# Patient Record
Sex: MALE | Race: WHITE | Employment: OTHER | URBAN - METROPOLITAN AREA
[De-identification: names, ages, dates, MRNs, and addresses within clinical notes are randomized per-mention and may not be internally consistent; named-entity substitution may affect disease eponyms.]

---

## 2017-07-05 PROBLEM — R79.89 LOW TESTOSTERONE: Status: ACTIVE | Noted: 2017-07-05

## 2017-11-30 ENCOUNTER — HOSPITAL ENCOUNTER (OUTPATIENT)
Dept: NUCLEAR MEDICINE | Age: 57
Discharge: HOME | End: 2017-11-30
Attending: FAMILY MEDICINE
Payer: COMMERCIAL

## 2017-11-30 ENCOUNTER — HOSPITAL ENCOUNTER (OUTPATIENT)
Dept: ULTRASOUND IMAGING | Age: 57
Discharge: HOME | End: 2017-11-30
Attending: FAMILY MEDICINE
Payer: COMMERCIAL

## 2017-11-30 VITALS — WEIGHT: 279.1 LBS | BODY MASS INDEX: 38.93 KG/M2

## 2017-11-30 DIAGNOSIS — R10.10 PAIN OF UPPER ABDOMEN: ICD-10-CM

## 2017-11-30 PROCEDURE — 76700 US EXAM ABDOM COMPLETE: CPT

## 2017-11-30 PROCEDURE — 74011250636 HC RX REV CODE- 250/636: Performed by: FAMILY MEDICINE

## 2017-11-30 PROCEDURE — 78227 HEPATOBIL SYST IMAGE W/DRUG: CPT

## 2017-11-30 RX ORDER — SODIUM CHLORIDE 0.9 % (FLUSH) 0.9 %
10 SYRINGE (ML) INJECTION
Status: COMPLETED | OUTPATIENT
Start: 2017-11-30 | End: 2017-11-30

## 2017-11-30 RX ADMIN — Medication 10 ML: at 13:04

## 2017-11-30 RX ADMIN — SINCALIDE 2.53 MCG: 5 INJECTION, POWDER, LYOPHILIZED, FOR SOLUTION INTRAVENOUS at 14:02

## 2017-12-04 NOTE — PROGRESS NOTES
The ultrasound was normal except for fatty liver changes of the liver. The gallbladder function appears to be normal with an ejection fraction of 66% which normal is about 35%. We could get another gastroenterology consult to follow-up particularly on the fatty liver and abdominal discomfort.   Let me know if you would like for me to make that appointment

## 2018-06-28 PROBLEM — E66.01 SEVERE OBESITY (BMI 35.0-39.9): Status: ACTIVE | Noted: 2018-06-28

## 2019-03-26 ENCOUNTER — HOSPITAL ENCOUNTER (OUTPATIENT)
Dept: CT IMAGING | Age: 59
Discharge: HOME | End: 2019-03-26
Attending: FAMILY MEDICINE

## 2019-03-26 DIAGNOSIS — R10.32 LLQ ABDOMINAL PAIN: ICD-10-CM

## 2019-03-26 RX ORDER — SODIUM CHLORIDE 0.9 % (FLUSH) 0.9 %
10 SYRINGE (ML) INJECTION
Status: COMPLETED | OUTPATIENT
Start: 2019-03-26 | End: 2019-03-26

## 2019-03-26 RX ADMIN — Medication 10 ML: at 16:27

## 2019-03-26 NOTE — PROGRESS NOTES
You do have evidence on the CT of diverticulosis but no evidence of diverticulitis although I know you have been on antibiotics. Otherwise the CT was unremarkable. Would probably suggest getting back to gastroenterology if your symptoms are not improving.   Let me know how you are doing

## 2019-10-11 PROBLEM — R73.09 ELEVATED HEMOGLOBIN A1C: Status: ACTIVE | Noted: 2019-10-11

## 2021-02-09 PROBLEM — K21.9 GASTROESOPHAGEAL REFLUX DISEASE WITHOUT ESOPHAGITIS: Status: ACTIVE | Noted: 2021-02-09

## 2021-02-09 PROBLEM — R07.2 PRECORDIAL PAIN: Status: ACTIVE | Noted: 2021-02-09

## 2021-02-18 ENCOUNTER — HOSPITAL ENCOUNTER (OUTPATIENT)
Dept: CT IMAGING | Age: 61
Discharge: HOME | End: 2021-02-18
Attending: INTERNAL MEDICINE
Payer: SELF-PAY

## 2021-02-18 DIAGNOSIS — R07.9 CHEST PAIN, UNSPECIFIED TYPE: ICD-10-CM

## 2021-02-18 DIAGNOSIS — E78.2 ELEVATED TRIGLYCERIDES WITH HIGH CHOLESTEROL: ICD-10-CM

## 2021-02-18 PROCEDURE — 75571 CT HRT W/O DYE W/CA TEST: CPT

## 2021-03-11 PROBLEM — R20.9: Status: ACTIVE | Noted: 2021-03-11

## 2021-04-13 ENCOUNTER — HOSPITAL ENCOUNTER (OUTPATIENT)
Dept: ULTRASOUND IMAGING | Age: 61
Discharge: HOME | End: 2021-04-13
Attending: FAMILY MEDICINE
Payer: COMMERCIAL

## 2021-04-13 ENCOUNTER — HOSPITAL ENCOUNTER (OUTPATIENT)
Dept: NUCLEAR MEDICINE | Age: 61
Discharge: HOME | End: 2021-04-13
Attending: FAMILY MEDICINE
Payer: COMMERCIAL

## 2021-04-13 DIAGNOSIS — R10.10 PAIN OF UPPER ABDOMEN: ICD-10-CM

## 2021-04-13 DIAGNOSIS — R11.0 NAUSEA: ICD-10-CM

## 2021-04-13 PROCEDURE — 76700 US EXAM ABDOM COMPLETE: CPT

## 2021-04-14 NOTE — PROGRESS NOTES
Ultrasound did confirm that you have some gallstones, probably need to get you referred to see a surgeon so wanted to see if you had a preference on who we send you to.   My recommendation would be someone like Dr. Elle Barrientos with [facility]

## 2021-04-20 PROBLEM — K80.20 CALCULUS OF GALLBLADDER WITHOUT CHOLECYSTITIS WITHOUT OBSTRUCTION: Status: ACTIVE | Noted: 2021-04-20

## 2021-05-05 ENCOUNTER — ANESTHESIA EVENT (OUTPATIENT)
Dept: SURGERY | Age: 61
End: 2021-05-05
Payer: COMMERCIAL

## 2021-05-06 ENCOUNTER — HOSPITAL ENCOUNTER (OUTPATIENT)
Age: 61
Setting detail: OUTPATIENT SURGERY
Discharge: HOME | End: 2021-05-06
Attending: SURGERY | Admitting: SURGERY
Payer: COMMERCIAL

## 2021-05-06 ENCOUNTER — ANESTHESIA (OUTPATIENT)
Dept: SURGERY | Age: 61
End: 2021-05-06
Payer: COMMERCIAL

## 2021-05-06 VITALS
BODY MASS INDEX: 31.71 KG/M2 | SYSTOLIC BLOOD PRESSURE: 119 MMHG | DIASTOLIC BLOOD PRESSURE: 70 MMHG | HEIGHT: 72 IN | WEIGHT: 234.13 LBS | RESPIRATION RATE: 17 BRPM | OXYGEN SATURATION: 97 % | TEMPERATURE: 97.6 F | HEART RATE: 74 BPM

## 2021-05-06 DIAGNOSIS — K80.20 CALCULUS OF GALLBLADDER WITHOUT CHOLECYSTITIS WITHOUT OBSTRUCTION: Primary | ICD-10-CM

## 2021-05-06 LAB
ALBUMIN SERPL-MCNC: 3.3 G/DL (ref 3.2–4.6)
ALBUMIN/GLOB SERPL: 0.9 {RATIO} (ref 1.2–3.5)
ALP SERPL-CCNC: 85 U/L (ref 50–136)
ALT SERPL-CCNC: 19 U/L (ref 12–65)
ANION GAP SERPL CALC-SCNC: 4 MMOL/L (ref 7–16)
APPEARANCE UR: CLEAR
AST SERPL-CCNC: 20 U/L (ref 15–37)
BASOPHILS # BLD: 0.1 K/UL (ref 0–0.2)
BASOPHILS NFR BLD: 1 % (ref 0–2)
BILIRUB SERPL-MCNC: 0.6 MG/DL (ref 0.2–1.1)
BILIRUB UR QL: NEGATIVE
BUN SERPL-MCNC: 13 MG/DL (ref 8–23)
CALCIUM SERPL-MCNC: 9 MG/DL (ref 8.3–10.4)
CHLORIDE SERPL-SCNC: 103 MMOL/L (ref 98–107)
CO2 SERPL-SCNC: 30 MMOL/L (ref 21–32)
COLOR UR: YELLOW
CREAT SERPL-MCNC: 0.69 MG/DL (ref 0.8–1.5)
DIFFERENTIAL METHOD BLD: ABNORMAL
EOSINOPHIL # BLD: 0.2 K/UL (ref 0–0.8)
EOSINOPHIL NFR BLD: 2 % (ref 0.5–7.8)
ERYTHROCYTE [DISTWIDTH] IN BLOOD BY AUTOMATED COUNT: 13.1 % (ref 11.9–14.6)
GLOBULIN SER CALC-MCNC: 3.5 G/DL (ref 2.3–3.5)
GLUCOSE BLD STRIP.AUTO-MCNC: 93 MG/DL (ref 65–100)
GLUCOSE SERPL-MCNC: 96 MG/DL (ref 65–100)
GLUCOSE UR STRIP.AUTO-MCNC: NEGATIVE MG/DL
HCT VFR BLD AUTO: 42.8 % (ref 41.1–50.3)
HGB BLD-MCNC: 14.2 G/DL (ref 13.6–17.2)
HGB UR QL STRIP: NEGATIVE
IMM GRANULOCYTES # BLD AUTO: 0.1 K/UL (ref 0–0.5)
IMM GRANULOCYTES NFR BLD AUTO: 1 % (ref 0–5)
INR PPP: 0.9
KETONES UR QL STRIP.AUTO: NEGATIVE MG/DL
LEUKOCYTE ESTERASE UR QL STRIP.AUTO: NEGATIVE
LYMPHOCYTES # BLD: 1.8 K/UL (ref 0.5–4.6)
LYMPHOCYTES NFR BLD: 20 % (ref 13–44)
MCH RBC QN AUTO: 30.4 PG (ref 26.1–32.9)
MCHC RBC AUTO-ENTMCNC: 33.2 G/DL (ref 31.4–35)
MCV RBC AUTO: 91.6 FL (ref 79.6–97.8)
MONOCYTES # BLD: 0.7 K/UL (ref 0.1–1.3)
MONOCYTES NFR BLD: 8 % (ref 4–12)
NEUTS SEG # BLD: 6.4 K/UL (ref 1.7–8.2)
NEUTS SEG NFR BLD: 69 % (ref 43–78)
NITRITE UR QL STRIP.AUTO: NEGATIVE
NRBC # BLD: 0 K/UL (ref 0–0.2)
PH UR STRIP: 7 [PH] (ref 5–9)
PLATELET # BLD AUTO: 223 K/UL (ref 150–450)
PMV BLD AUTO: 9.2 FL (ref 9.4–12.3)
POTASSIUM SERPL-SCNC: 4.4 MMOL/L (ref 3.5–5.1)
PROT SERPL-MCNC: 6.8 G/DL (ref 6.3–8.2)
PROT UR STRIP-MCNC: NEGATIVE MG/DL
PROTHROMBIN TIME: 12.5 SEC (ref 12.5–14.7)
RBC # BLD AUTO: 4.67 M/UL (ref 4.23–5.6)
SERVICE CMNT-IMP: NORMAL
SODIUM SERPL-SCNC: 137 MMOL/L (ref 138–145)
SP GR UR REFRACTOMETRY: 1.02 (ref 1–1.02)
UROBILINOGEN UR QL STRIP.AUTO: 1 EU/DL (ref 0.2–1)
WBC # BLD AUTO: 9.3 K/UL (ref 4.3–11.1)

## 2021-05-06 PROCEDURE — 77030012770 HC TRCR OPT FX AMR -B: Performed by: SURGERY

## 2021-05-06 PROCEDURE — 77030008518 HC TBNG INSUF ENDO STRY -B: Performed by: SURGERY

## 2021-05-06 PROCEDURE — 77030039425 HC BLD LARYNG TRULITE DISP TELE -A: Performed by: STUDENT IN AN ORGANIZED HEALTH CARE EDUCATION/TRAINING PROGRAM

## 2021-05-06 PROCEDURE — 74011250636 HC RX REV CODE- 250/636: Performed by: SURGERY

## 2021-05-06 PROCEDURE — 77030037088 HC TUBE ENDOTRACH ORAL NSL COVD-A: Performed by: STUDENT IN AN ORGANIZED HEALTH CARE EDUCATION/TRAINING PROGRAM

## 2021-05-06 PROCEDURE — 74011250636 HC RX REV CODE- 250/636: Performed by: NURSE ANESTHETIST, CERTIFIED REGISTERED

## 2021-05-06 PROCEDURE — 74011250636 HC RX REV CODE- 250/636: Performed by: STUDENT IN AN ORGANIZED HEALTH CARE EDUCATION/TRAINING PROGRAM

## 2021-05-06 PROCEDURE — 2709999900 HC NON-CHARGEABLE SUPPLY: Performed by: SURGERY

## 2021-05-06 PROCEDURE — 77030040361 HC SLV COMPR DVT MDII -B: Performed by: SURGERY

## 2021-05-06 PROCEDURE — 85025 COMPLETE CBC W/AUTO DIFF WBC: CPT

## 2021-05-06 PROCEDURE — 85610 PROTHROMBIN TIME: CPT

## 2021-05-06 PROCEDURE — 80053 COMPREHEN METABOLIC PANEL: CPT

## 2021-05-06 PROCEDURE — 76060000033 HC ANESTHESIA 1 TO 1.5 HR: Performed by: SURGERY

## 2021-05-06 PROCEDURE — 77030020829: Performed by: SURGERY

## 2021-05-06 PROCEDURE — 77030007955 HC PCH ENDOSC SPEC J&J -B: Performed by: SURGERY

## 2021-05-06 PROCEDURE — 74011000250 HC RX REV CODE- 250: Performed by: NURSE ANESTHETIST, CERTIFIED REGISTERED

## 2021-05-06 PROCEDURE — 77030040922 HC BLNKT HYPOTHRM STRY -A: Performed by: STUDENT IN AN ORGANIZED HEALTH CARE EDUCATION/TRAINING PROGRAM

## 2021-05-06 PROCEDURE — 74011000250 HC RX REV CODE- 250: Performed by: SURGERY

## 2021-05-06 PROCEDURE — 77030008756 HC TU IRR SUC STRY -B: Performed by: SURGERY

## 2021-05-06 PROCEDURE — 77030021158 HC TRCR BLN GELPRT AMR -B: Performed by: SURGERY

## 2021-05-06 PROCEDURE — 76210000006 HC OR PH I REC 0.5 TO 1 HR: Performed by: SURGERY

## 2021-05-06 PROCEDURE — 88304 TISSUE EXAM BY PATHOLOGIST: CPT

## 2021-05-06 PROCEDURE — 77030019908 HC STETH ESOPH SIMS -A: Performed by: STUDENT IN AN ORGANIZED HEALTH CARE EDUCATION/TRAINING PROGRAM

## 2021-05-06 PROCEDURE — 82962 GLUCOSE BLOOD TEST: CPT

## 2021-05-06 PROCEDURE — 47562 LAPAROSCOPIC CHOLECYSTECTOMY: CPT | Performed by: SURGERY

## 2021-05-06 PROCEDURE — 77030031139 HC SUT VCRL2 J&J -A: Performed by: SURGERY

## 2021-05-06 PROCEDURE — 77030016151 HC PROTCTR LNS DFOG COVD -B: Performed by: SURGERY

## 2021-05-06 PROCEDURE — 76210000020 HC REC RM PH II FIRST 0.5 HR: Performed by: SURGERY

## 2021-05-06 PROCEDURE — 77030000038 HC TIP SCIS LAPSCP SURI -B: Performed by: SURGERY

## 2021-05-06 PROCEDURE — 81003 URINALYSIS AUTO W/O SCOPE: CPT

## 2021-05-06 PROCEDURE — 74011250637 HC RX REV CODE- 250/637: Performed by: STUDENT IN AN ORGANIZED HEALTH CARE EDUCATION/TRAINING PROGRAM

## 2021-05-06 PROCEDURE — 47562 LAPAROSCOPIC CHOLECYSTECTOMY: CPT | Performed by: NURSE PRACTITIONER

## 2021-05-06 PROCEDURE — 77030010513 HC APPL CLP LIG J&J -C: Performed by: SURGERY

## 2021-05-06 PROCEDURE — 76010000161 HC OR TIME 1 TO 1.5 HR INTENSV-TIER 1: Performed by: SURGERY

## 2021-05-06 RX ORDER — SODIUM CHLORIDE 0.9 % (FLUSH) 0.9 %
5-40 SYRINGE (ML) INJECTION AS NEEDED
Status: DISCONTINUED | OUTPATIENT
Start: 2021-05-06 | End: 2021-05-06 | Stop reason: HOSPADM

## 2021-05-06 RX ORDER — LIDOCAINE HYDROCHLORIDE 10 MG/ML
0.1 INJECTION INFILTRATION; PERINEURAL AS NEEDED
Status: DISCONTINUED | OUTPATIENT
Start: 2021-05-06 | End: 2021-05-06 | Stop reason: HOSPADM

## 2021-05-06 RX ORDER — SODIUM CHLORIDE, SODIUM LACTATE, POTASSIUM CHLORIDE, CALCIUM CHLORIDE 600; 310; 30; 20 MG/100ML; MG/100ML; MG/100ML; MG/100ML
100 INJECTION, SOLUTION INTRAVENOUS CONTINUOUS
Status: DISCONTINUED | OUTPATIENT
Start: 2021-05-06 | End: 2021-05-06 | Stop reason: HOSPADM

## 2021-05-06 RX ORDER — SODIUM CHLORIDE 0.9 % (FLUSH) 0.9 %
5-40 SYRINGE (ML) INJECTION EVERY 8 HOURS
Status: DISCONTINUED | OUTPATIENT
Start: 2021-05-06 | End: 2021-05-06 | Stop reason: HOSPADM

## 2021-05-06 RX ORDER — CEFAZOLIN SODIUM/WATER 2 G/20 ML
2 SYRINGE (ML) INTRAVENOUS ONCE
Status: COMPLETED | OUTPATIENT
Start: 2021-05-06 | End: 2021-05-06

## 2021-05-06 RX ORDER — OXYCODONE HYDROCHLORIDE 5 MG/1
5 TABLET ORAL
Status: COMPLETED | OUTPATIENT
Start: 2021-05-06 | End: 2021-05-06

## 2021-05-06 RX ORDER — DIPHENHYDRAMINE HYDROCHLORIDE 50 MG/ML
12.5 INJECTION, SOLUTION INTRAMUSCULAR; INTRAVENOUS
Status: DISCONTINUED | OUTPATIENT
Start: 2021-05-06 | End: 2021-05-06 | Stop reason: HOSPADM

## 2021-05-06 RX ORDER — METRONIDAZOLE 500 MG/100ML
500 INJECTION, SOLUTION INTRAVENOUS ONCE
Status: COMPLETED | OUTPATIENT
Start: 2021-05-06 | End: 2021-05-06

## 2021-05-06 RX ORDER — GLYCOPYRROLATE 0.2 MG/ML
INJECTION INTRAMUSCULAR; INTRAVENOUS AS NEEDED
Status: DISCONTINUED | OUTPATIENT
Start: 2021-05-06 | End: 2021-05-06 | Stop reason: HOSPADM

## 2021-05-06 RX ORDER — ROCURONIUM BROMIDE 10 MG/ML
INJECTION, SOLUTION INTRAVENOUS AS NEEDED
Status: DISCONTINUED | OUTPATIENT
Start: 2021-05-06 | End: 2021-05-06 | Stop reason: HOSPADM

## 2021-05-06 RX ORDER — MIDAZOLAM HYDROCHLORIDE 1 MG/ML
2 INJECTION, SOLUTION INTRAMUSCULAR; INTRAVENOUS
Status: DISCONTINUED | OUTPATIENT
Start: 2021-05-06 | End: 2021-05-06 | Stop reason: HOSPADM

## 2021-05-06 RX ORDER — LIDOCAINE HYDROCHLORIDE 20 MG/ML
INJECTION, SOLUTION EPIDURAL; INFILTRATION; INTRACAUDAL; PERINEURAL AS NEEDED
Status: DISCONTINUED | OUTPATIENT
Start: 2021-05-06 | End: 2021-05-06 | Stop reason: HOSPADM

## 2021-05-06 RX ORDER — ONDANSETRON 2 MG/ML
INJECTION INTRAMUSCULAR; INTRAVENOUS AS NEEDED
Status: DISCONTINUED | OUTPATIENT
Start: 2021-05-06 | End: 2021-05-06 | Stop reason: HOSPADM

## 2021-05-06 RX ORDER — BUPIVACAINE HYDROCHLORIDE AND EPINEPHRINE 5; 5 MG/ML; UG/ML
INJECTION, SOLUTION EPIDURAL; INTRACAUDAL; PERINEURAL AS NEEDED
Status: DISCONTINUED | OUTPATIENT
Start: 2021-05-06 | End: 2021-05-06 | Stop reason: HOSPADM

## 2021-05-06 RX ORDER — NEOSTIGMINE METHYLSULFATE 1 MG/ML
INJECTION, SOLUTION INTRAVENOUS AS NEEDED
Status: DISCONTINUED | OUTPATIENT
Start: 2021-05-06 | End: 2021-05-06 | Stop reason: HOSPADM

## 2021-05-06 RX ORDER — DEXAMETHASONE SODIUM PHOSPHATE 100 MG/10ML
INJECTION INTRAMUSCULAR; INTRAVENOUS AS NEEDED
Status: DISCONTINUED | OUTPATIENT
Start: 2021-05-06 | End: 2021-05-06 | Stop reason: HOSPADM

## 2021-05-06 RX ORDER — NALOXONE HYDROCHLORIDE 0.4 MG/ML
0.1 INJECTION, SOLUTION INTRAMUSCULAR; INTRAVENOUS; SUBCUTANEOUS AS NEEDED
Status: DISCONTINUED | OUTPATIENT
Start: 2021-05-06 | End: 2021-05-06 | Stop reason: HOSPADM

## 2021-05-06 RX ORDER — HYDROMORPHONE HYDROCHLORIDE 1 MG/ML
0.5 INJECTION, SOLUTION INTRAMUSCULAR; INTRAVENOUS; SUBCUTANEOUS
Status: DISCONTINUED | OUTPATIENT
Start: 2021-05-06 | End: 2021-05-06 | Stop reason: HOSPADM

## 2021-05-06 RX ORDER — PROPOFOL 10 MG/ML
INJECTION, EMULSION INTRAVENOUS AS NEEDED
Status: DISCONTINUED | OUTPATIENT
Start: 2021-05-06 | End: 2021-05-06 | Stop reason: HOSPADM

## 2021-05-06 RX ORDER — HYDROCODONE BITARTRATE AND ACETAMINOPHEN 5; 325 MG/1; MG/1
1 TABLET ORAL
Qty: 15 TAB | Refills: 0 | Status: SHIPPED | OUTPATIENT
Start: 2021-05-06 | End: 2021-05-11

## 2021-05-06 RX ORDER — FLUMAZENIL 0.1 MG/ML
0.2 INJECTION INTRAVENOUS
Status: DISCONTINUED | OUTPATIENT
Start: 2021-05-06 | End: 2021-05-06 | Stop reason: HOSPADM

## 2021-05-06 RX ORDER — HYDROMORPHONE HYDROCHLORIDE 2 MG/ML
INJECTION, SOLUTION INTRAMUSCULAR; INTRAVENOUS; SUBCUTANEOUS AS NEEDED
Status: DISCONTINUED | OUTPATIENT
Start: 2021-05-06 | End: 2021-05-06 | Stop reason: HOSPADM

## 2021-05-06 RX ORDER — FENTANYL CITRATE 50 UG/ML
INJECTION, SOLUTION INTRAMUSCULAR; INTRAVENOUS AS NEEDED
Status: DISCONTINUED | OUTPATIENT
Start: 2021-05-06 | End: 2021-05-06 | Stop reason: HOSPADM

## 2021-05-06 RX ADMIN — OXYCODONE HYDROCHLORIDE 5 MG: 5 TABLET ORAL at 09:44

## 2021-05-06 RX ADMIN — Medication 5 MG: at 09:03

## 2021-05-06 RX ADMIN — PROPOFOL 20 MG: 10 INJECTION, EMULSION INTRAVENOUS at 08:52

## 2021-05-06 RX ADMIN — ROCURONIUM BROMIDE 10 MG: 10 INJECTION, SOLUTION INTRAVENOUS at 08:45

## 2021-05-06 RX ADMIN — ONDANSETRON 4 MG: 2 INJECTION INTRAMUSCULAR; INTRAVENOUS at 08:52

## 2021-05-06 RX ADMIN — HYDROMORPHONE HYDROCHLORIDE 0.2 MG: 2 INJECTION INTRAMUSCULAR; INTRAVENOUS; SUBCUTANEOUS at 08:45

## 2021-05-06 RX ADMIN — ROCURONIUM BROMIDE 10 MG: 10 INJECTION, SOLUTION INTRAVENOUS at 08:25

## 2021-05-06 RX ADMIN — PROPOFOL 200 MG: 10 INJECTION, EMULSION INTRAVENOUS at 08:01

## 2021-05-06 RX ADMIN — FENTANYL CITRATE 100 MCG: 50 INJECTION INTRAMUSCULAR; INTRAVENOUS at 08:01

## 2021-05-06 RX ADMIN — FENTANYL CITRATE 50 MCG: 50 INJECTION INTRAMUSCULAR; INTRAVENOUS at 08:30

## 2021-05-06 RX ADMIN — PROPOFOL 30 MG: 10 INJECTION, EMULSION INTRAVENOUS at 08:55

## 2021-05-06 RX ADMIN — HYDROMORPHONE HYDROCHLORIDE 0.2 MG: 2 INJECTION INTRAMUSCULAR; INTRAVENOUS; SUBCUTANEOUS at 08:50

## 2021-05-06 RX ADMIN — SODIUM CHLORIDE, SODIUM LACTATE, POTASSIUM CHLORIDE, AND CALCIUM CHLORIDE 100 ML/HR: 600; 310; 30; 20 INJECTION, SOLUTION INTRAVENOUS at 06:20

## 2021-05-06 RX ADMIN — DEXAMETHASONE SODIUM PHOSPHATE 10 MG: 10 INJECTION INTRAMUSCULAR; INTRAVENOUS at 08:13

## 2021-05-06 RX ADMIN — LIDOCAINE HYDROCHLORIDE 100 MG: 20 INJECTION, SOLUTION EPIDURAL; INFILTRATION; INTRACAUDAL; PERINEURAL at 08:01

## 2021-05-06 RX ADMIN — CEFAZOLIN 2 G: 1 INJECTION, POWDER, FOR SOLUTION INTRAVENOUS at 08:11

## 2021-05-06 RX ADMIN — METRONIDAZOLE 500 MG: 500 INJECTION, SOLUTION INTRAVENOUS at 06:21

## 2021-05-06 RX ADMIN — HYDROMORPHONE HYDROCHLORIDE 0.4 MG: 2 INJECTION INTRAMUSCULAR; INTRAVENOUS; SUBCUTANEOUS at 08:33

## 2021-05-06 RX ADMIN — GLYCOPYRROLATE 0.8 MG: 0.2 INJECTION, SOLUTION INTRAMUSCULAR; INTRAVENOUS at 09:03

## 2021-05-06 RX ADMIN — PROPOFOL 30 MG: 10 INJECTION, EMULSION INTRAVENOUS at 08:58

## 2021-05-06 RX ADMIN — ROCURONIUM BROMIDE 50 MG: 10 INJECTION, SOLUTION INTRAVENOUS at 08:02

## 2021-05-06 RX ADMIN — FENTANYL CITRATE 50 MCG: 50 INJECTION INTRAMUSCULAR; INTRAVENOUS at 08:23

## 2021-05-06 RX ADMIN — METRONIDAZOLE 500 MG: 500 INJECTION, SOLUTION INTRAVENOUS at 08:08

## 2021-05-06 RX ADMIN — SODIUM CHLORIDE, SODIUM LACTATE, POTASSIUM CHLORIDE, AND CALCIUM CHLORIDE: 600; 310; 30; 20 INJECTION, SOLUTION INTRAVENOUS at 09:02

## 2021-05-06 NOTE — DISCHARGE INSTRUCTIONS
Discharge Instructions/Follow-up Plans:   MD Instructions:     Follow-up with Dr. Regan Chavez.  Keep incisions clean and dry. Remove plastic dressing and gauze after 48 hours, leave sterile strips on for 7-10 days, Okay to shower. Do not apply lotions, creams, or ointments to incisions.     Diet - as tolerated- low fat  Activity - ambulate - as tolerated - no heavy lifting greater than 20 pounds. May shower - no tub baths or soaking/submerging.     No driving while taking narcotics. Do not drink alcohol while taking narcotics. Resume other home medications. Take Rx as prescribed. Take stool softeners while on narcotics to avoid constipation.     If problems or questions arise, please call our office at [phone]. Radha  After general anesthesia or intravenous sedation, for 24 hours or while taking prescription Narcotics:  · Limit your activities  · A responsible adult needs to be with you for the next 24 hours  · Do not drive and operate hazardous machinery  · Do not make important personal or business decisions  · Do not drink alcoholic beverages  · If you have not urinated within 8 hours after discharge, and you are experiencing discomfort from urinary retention, please go to the nearest ED. · If you have sleep apnea and have a CPAP machine, please use it for all naps and sleeping. · Please use caution when taking narcotics and any of your home medications that may cause drowsiness. *  Please give a list of your current medications to your Primary Care Provider. *  Please update this list whenever your medications are discontinued, doses are      changed, or new medications (including over-the-counter products) are added. *  Please carry medication information at all times in case of emergency situations.     These are general instructions for a healthy lifestyle:  No smoking/ No tobacco products/ Avoid exposure to second hand smoke  Surgeon General's Warning:  Quitting smoking now greatly reduces serious risk to your health. Obesity, smoking, and sedentary lifestyle greatly increases your risk for illness  A healthy diet, regular physical exercise & weight monitoring are important for maintaining a healthy lifestyle    You may be retaining fluid if you have a history of heart failure or if you experience any of the following symptoms:  Weight gain of 3 pounds or more overnight or 5 pounds in a week, increased swelling in our hands or feet or shortness of breath while lying flat in bed.   Please call your doctor as soon as you notice any of these symptoms; do not wait until your next office visit.

## 2021-05-06 NOTE — OP NOTES
[address] [facility]  OPERATIVE REPORT    Name:  Erika Fernández  MR#:  [id number]  :  1960  ACCOUNT #:  [de-identified]  DATE OF SERVICE:  2021    PREOPERATIVE DIAGNOSIS:  Symptomatic cholelithiasis with chronic cholecystitis. POSTOPERATIVE DIAGNOSIS:  Symptomatic cholelithiasis with chronic cholecystitis. PROCEDURE PERFORMED:  Laparoscopic cholecystectomy. SURGEON:  Jason Luther MD    ASSISTANT:  Sajan Tabor NP    ANESTHESIA:  general    COMPLICATIONS:  none    SPECIMENS REMOVED:  As above    IMPLANTS:  none    ESTIMATED BLOOD LOSS:  Minimum. INDICATION:  This is a 59-year-old gentleman who presented with symptomatic cholelithiasis and surgery offered to him. He understood the risks and benefits and agreed to proceed. FINDINGS:  He does have numerous small stones in the gallbladder. PROCEDURE:  After informed consent obtained, the patient brought into the operating room, left in supine position. General anesthesia was administered. The patient's abdomen was prepped and draped in routine fashion. Infraumbilical incision was made. Tello cannula inserted. Pneumoperitoneum created. Three 5-mm trocar placed in the right upper quadrant in the routine fashion. He is morbidly obese and the exposure a little challenging. With careful retractions, the junction of infundibulum and cystic duct area was able to be adequately exposed and then dissected. The duct was clipped and divided first.  Then, we kept the dissection right along the gallbladder and there appeared to be small cystic artery which was cauterized and then reclipped and the gallbladder was removed from the liver bed with a cautery device, retrieved from peritoneal cavity with Endobag. Surgical field inspected. Copious irrigation was used. Return of fluid was clear. No bile, no blood identified. Then, the pneumoperitoneum was evacuated. All the trocars were removed.   The infraumbilical incision was closed on the fascia with 0 Vicryl stitch. All skin incision closed with 4-0 Vicryl stitch in subcuticular fashion. The patient tolerated the procedure well, transferred to the recovery room in stable condition. All the instrument count and lap count were correct. Estimated blood loss was minimum. As noted, James Head is the first assistant in this case. She offered excellent exposure to make this surgery quicker and safer.       Iris Francisco MD      [id number]  D:  05/06/2021 9:21  T:  05/06/2021 13:15  JOB #:  [id number]

## 2021-05-06 NOTE — ANESTHESIA POSTPROCEDURE EVALUATION
Procedure(s):  CHOLECYSTECTOMY LAPAROSCOPIC. general    Anesthesia Post Evaluation      Multimodal analgesia: multimodal analgesia used between 6 hours prior to anesthesia start to PACU discharge  Patient location during evaluation: bedside  Patient participation: complete - patient participated  Level of consciousness: awake and alert  Pain management: adequate  Airway patency: patent  Anesthetic complications: no  Cardiovascular status: acceptable  Respiratory status: acceptable  Hydration status: acceptable  Post anesthesia nausea and vomiting:  controlled  Final Post Anesthesia Temperature Assessment:  Normothermia (36.0-37.5 degrees C)      INITIAL Post-op Vital signs:   Vitals Value Taken Time   /75 05/06/21 0952   Temp 36.3 °C (97.3 °F) 05/06/21 0925   Pulse 82 05/06/21 0952   Resp 17 05/06/21 0945   SpO2 97 % 05/06/21 0952   Vitals shown include unvalidated device data.

## 2021-05-06 NOTE — ANESTHESIA PREPROCEDURE EVALUATION
Anesthetic History   No history of anesthetic complications            Review of Systems / Medical History  Patient summary reviewed and pertinent labs reviewed    Pulmonary        Sleep apnea: No treatment           Neuro/Psych   Within defined limits           Cardiovascular    Hypertension: well controlled              Exercise tolerance: >4 METS     GI/Hepatic/Renal     GERD: poorly controlled           Endo/Other    Diabetes: well controlled, type 2    Obesity     Other Findings              Physical Exam    Airway  Mallampati: II  TM Distance: > 6 cm  Neck ROM: normal range of motion   Mouth opening: Normal     Cardiovascular  Regular rate and rhythm,  S1 and S2 normal,  no murmur, click, rub, or gallop  Rhythm: regular  Rate: normal         Dental  No notable dental hx       Pulmonary  Breath sounds clear to auscultation               Abdominal         Other Findings            Anesthetic Plan    ASA: 3  Anesthesia type: general          Induction: Intravenous  Anesthetic plan and risks discussed with: Patient and Family

## 2021-05-07 LAB
GLUCOSE BLD STRIP.AUTO-MCNC: 73 MG/DL (ref 65–100)
SERVICE CMNT-IMP: NORMAL

## 2021-11-18 PROBLEM — E11.9 TYPE 2 DIABETES MELLITUS (HCC): Status: ACTIVE | Noted: 2021-11-18

## 2021-12-07 PROBLEM — M19.011 ARTHRITIS OF RIGHT SHOULDER REGION: Status: ACTIVE | Noted: 2021-12-07

## 2022-01-11 PROBLEM — R68.89 ABNORMAL CLINICAL FINDING: Status: ACTIVE | Noted: 2022-01-11

## 2022-01-11 PROBLEM — R20.2 PARESTHESIA OF BOTH FEET: Status: ACTIVE | Noted: 2022-01-11

## 2022-01-11 PROBLEM — M79.2 NEUROPATHIC PAIN: Status: ACTIVE | Noted: 2022-01-11

## 2022-01-11 PROBLEM — R20.8 DYSESTHESIA: Status: ACTIVE | Noted: 2022-01-11

## 2022-01-11 PROBLEM — Z79.899 ENCOUNTER FOR MEDICATION MANAGEMENT: Status: ACTIVE | Noted: 2022-01-11

## 2022-02-01 NOTE — BRIEF OP NOTE
Brief Postoperative Note    Patient: Reynaldo Jacobs  YOB: 1960  MRN: [id number]    Date of Procedure: 5/6/2021     Pre-Op Diagnosis: cholelithiasis and Cholecystitis [K81.9]    Post-Op Diagnosis: Same as preoperative diagnosis. Procedure(s):  CHOLECYSTECTOMY LAPAROSCOPIC    Surgeon(s):   Aga Saleh MD    Surgical Assistant: Nurse Practitioner: Cyril Bui NP    Anesthesia: General     Estimated Blood Loss (mL): Minimal    Complications: None    Specimens:   ID Type Source Tests Collected by Time Destination   1 : Gallbladder Fresh Abdomen  gAa Saleh MD 5/6/2021 5321 Pathology        Implants: * No implants in log *    Drains: * No LDAs found *    Findings: as above    Electronically Signed by Constantino Kaufman MD on 5/6/2021 at 9:16 AM DISPLAY PLAN FREE TEXT DISPLAY PLAN FREE TEXT DISPLAY PLAN FREE TEXT DISPLAY PLAN FREE TEXT DISPLAY PLAN FREE TEXT DISPLAY PLAN FREE TEXT DISPLAY PLAN FREE TEXT DISPLAY PLAN FREE TEXT DISPLAY PLAN FREE TEXT

## 2022-02-23 ENCOUNTER — HOSPITAL ENCOUNTER (OUTPATIENT)
Dept: SURGERY | Age: 62
Discharge: HOME | End: 2022-02-23
Attending: ORTHOPAEDIC SURGERY
Payer: COMMERCIAL

## 2022-02-23 VITALS
HEART RATE: 66 BPM | HEIGHT: 72 IN | OXYGEN SATURATION: 98 % | BODY MASS INDEX: 31.15 KG/M2 | WEIGHT: 230 LBS | TEMPERATURE: 98.3 F | SYSTOLIC BLOOD PRESSURE: 123 MMHG | RESPIRATION RATE: 18 BRPM | DIASTOLIC BLOOD PRESSURE: 73 MMHG

## 2022-02-23 LAB
ANION GAP SERPL CALC-SCNC: 4 MMOL/L (ref 7–16)
ATRIAL RATE: 59 BPM
BACTERIA SPEC CULT: NORMAL
BUN SERPL-MCNC: 21 MG/DL (ref 8–23)
CALCIUM SERPL-MCNC: 8.9 MG/DL (ref 8.3–10.4)
CALCULATED P AXIS, ECG09: 21 DEGREES
CALCULATED R AXIS, ECG10: 43 DEGREES
CALCULATED T AXIS, ECG11: 10 DEGREES
CHLORIDE SERPL-SCNC: 109 MMOL/L (ref 98–107)
CO2 SERPL-SCNC: 28 MMOL/L (ref 21–32)
CREAT SERPL-MCNC: 0.71 MG/DL (ref 0.8–1.5)
DIAGNOSIS, 93000: NORMAL
ERYTHROCYTE [DISTWIDTH] IN BLOOD BY AUTOMATED COUNT: 13.2 % (ref 11.9–14.6)
GLUCOSE SERPL-MCNC: 105 MG/DL (ref 65–100)
HCT VFR BLD AUTO: 46.4 % (ref 41.1–50.3)
HGB BLD-MCNC: 15.5 G/DL (ref 13.6–17.2)
MCH RBC QN AUTO: 29.1 PG (ref 26.1–32.9)
MCHC RBC AUTO-ENTMCNC: 33.4 G/DL (ref 31.4–35)
MCV RBC AUTO: 87.2 FL (ref 79.6–97.8)
NRBC # BLD: 0 K/UL (ref 0–0.2)
P-R INTERVAL, ECG05: 222 MS
PLATELET # BLD AUTO: 260 K/UL (ref 150–450)
PMV BLD AUTO: 9.6 FL (ref 9.4–12.3)
POTASSIUM SERPL-SCNC: 3.9 MMOL/L (ref 3.5–5.1)
Q-T INTERVAL, ECG07: 384 MS
QRS DURATION, ECG06: 92 MS
QTC CALCULATION (BEZET), ECG08: 380 MS
RBC # BLD AUTO: 5.32 M/UL (ref 4.23–5.6)
SERVICE CMNT-IMP: NORMAL
SODIUM SERPL-SCNC: 141 MMOL/L (ref 136–145)
VENTRICULAR RATE, ECG03: 59 BPM
WBC # BLD AUTO: 8.8 K/UL (ref 4.3–11.1)

## 2022-02-23 PROCEDURE — 93005 ELECTROCARDIOGRAM TRACING: CPT

## 2022-02-23 PROCEDURE — 77030027138 HC INCENT SPIROMETER -A

## 2022-02-23 PROCEDURE — 85027 COMPLETE CBC AUTOMATED: CPT

## 2022-02-23 PROCEDURE — 80048 BASIC METABOLIC PNL TOTAL CA: CPT

## 2022-02-23 PROCEDURE — 94760 N-INVAS EAR/PLS OXIMETRY 1: CPT

## 2022-02-23 PROCEDURE — 87641 MR-STAPH DNA AMP PROBE: CPT

## 2022-02-23 RX ORDER — IBUPROFEN 400 MG/1
TABLET ORAL
COMMUNITY
End: 2022-03-05

## 2022-02-23 RX ORDER — LANOLIN ALCOHOL/MO/W.PET/CERES
1000 CREAM (GRAM) TOPICAL DAILY
COMMUNITY

## 2022-02-23 NOTE — PERIOP NOTES
The below lab results are within anesthesia limits.      Recent Results (from the past 12 hour(s))   EKG, 12 LEAD, INITIAL    Collection Time: 02/23/22  9:51 AM   Result Value Ref Range    Ventricular Rate 59 BPM    Atrial Rate 59 BPM    P-R Interval 222 ms    QRS Duration 92 ms    Q-T Interval 384 ms    QTC Calculation (Bezet) 380 ms    Calculated P Axis 21 degrees    Calculated R Axis 43 degrees    Calculated T Axis 10 degrees    Diagnosis       Sinus bradycardia with 1st degree A-V block  Possible Lateral infarct , age undetermined  Abnormal ECG  No previous ECGs available     CBC W/O DIFF    Collection Time: 02/23/22  9:58 AM   Result Value Ref Range    WBC 8.8 4.3 - 11.1 K/uL    RBC 5.32 4.23 - 5.6 M/uL    HGB 15.5 13.6 - 17.2 g/dL    HCT 46.4 41.1 - 50.3 %    MCV 87.2 79.6 - 97.8 FL    MCH 29.1 26.1 - 32.9 PG    MCHC 33.4 31.4 - 35.0 g/dL    RDW 13.2 11.9 - 14.6 %    PLATELET 661 471 - 569 K/uL    MPV 9.6 9.4 - 12.3 FL    ABSOLUTE NRBC 0.00 0.0 - 0.2 K/uL   METABOLIC PANEL, BASIC    Collection Time: 02/23/22  9:58 AM   Result Value Ref Range    Sodium 141 136 - 145 mmol/L    Potassium 3.9 3.5 - 5.1 mmol/L    Chloride 109 (H) 98 - 107 mmol/L    CO2 28 21 - 32 mmol/L    Anion gap 4 (L) 7 - 16 mmol/L    Glucose 105 (H) 65 - 100 mg/dL    BUN 21 8 - 23 MG/DL    Creatinine 0.71 (L) 0.8 - 1.5 MG/DL    GFR est AA >60 >60 ml/min/1.73m2    GFR est non-AA >60 >60 ml/min/1.73m2    Calcium 8.9 8.3 - 10.4 MG/DL

## 2022-02-23 NOTE — PROGRESS NOTES
02/23/22 1030   Oxygen Therapy   O2 Sat (%) 98 %   Pulse via Oximetry 68 beats per minute   O2 Device None (Room air)   Pre-Treatment   Breath Sounds Bilateral Clear   Pre FEV1 (liters) 3 liters   % Predicted 78      Pt's symptoms include:  Snoring  Tiredness- excessive daytime sleepiness  HTN  PREVIOUS DIAGNOSIS OF NILAY  Neck size         46     cm  Modified Arias stage 4  SACS Score 35  STOP BANG 6  Prospect Sleepiness scale 15  Height   6   '  0  \"   Weight  230   lbs  BMI 31.19    Sleepiness Scale:     Sitting and reading 2    Watching TV 3    Sitting inactive in a public place 2    As a passenger in a car for an hour without a break 3    Lying down to rest in the afternoon when circumstances Permits 1    Sitting and talking to someone 1    Sitting quietly after lunch without alcohol 3    In a car, while stopped for a few minutes 0    Total :  15      Refer patient for sleep study based on above assessment. Initial respiratory Assessment completed with pt. Pt was interviewed and evaluated in [facility] prior to surgery. Patient ID:  Shirley Bethea  [id number]  54 y.o.  1960  Surgeon: Sharmin Ruff  Date of Surgery: The linked surgery was not found. Please check manually. Procedure:  Total Right Shoulder Arthroplasty  Primary Care Physician: Sahra Clarke -[phone]  Specialists:     Pt taught proper COUGH technique  DIAPHRAGMATIC BREATHING EXERCISE INSTRUCTIONS GIVEN    History of smoking:   DENIES                 Quit date:         Secondhand smoke:PARENTS    Past procedures with Oxygen desaturation or delayed awakening:DENIES    Past Medical History:   Diagnosis Date    Elevated triglycerides with high cholesterol 4/16/2013    GERD (gastroesophageal reflux disease)     managed with medication     History of colon polyps 4/16/2013    8/10    Hypertension medication    IBS (irritable bowel syndrome)     Insomnia     controlled with  Ambien    Knee pain, bilateral     Low testosterone 7/5/2017    Shoulder pain, bilateral     cortisone injections     Tendonitis     left shoulder    Testosterone deficiency     using testosterone gel    Type 2 diabetes mellitus ([facility] [address]     pt states pre-diabetic, FBS , A1c 5.7 (10/2021), denies hypoglycemia    Unspecified sleep apnea 2011    pt says this is questionable, pt is not using CPAP (noted 2/23/22)     Vertigo 2021    hx       FORMER , RETIRED EMT    HX OF PNA YEARS AGO  NILAY- NO CPAP  Respiratory history:DENIES SOB                                                                     Respiratory meds:  DENIES    FAMILY PRESENT:             NO     PAST SLEEP STUDY:        YES                    HX OF NILAY:                        YES                     NILAY assessment:     NILAY- DOES NOT USE CPAP. DANGERS OF UNTREATED NILAY EXPLAINED TO PT                                          SLEEPS ON SIDE         PHYSICAL EXAM   Body mass index is 31.19 kg/m².    Visit Vitals  /73 (BP Patient Position: Sitting)   Pulse 66   Temp 98.3 °F (36.8 °C)   Resp 18   Ht 6' (1.829 m)   Wt 104.3 kg (230 lb)   SpO2 (P) 98%   BMI 31.19 kg/m²     Neck circumference:   46   cm    Loud snoring:                                                 YES             Witnessed apnea or wakening gasping or choking:        DENIES        Awakens with headaches:                                               DENIES  Morning or daytime tiredness/ sleepiness:                           TIRED  Dry mouth or sore throat in morning:                    DENIES                                   Arias stage:  4                                   SACS score:35  Stop Bang   STOP-BANG  Does the patient snore loudly (louder than talking or loud enough to be heard through closed doors)?: Yes  Does the patient often feel tired, fatigued, or sleepy during the daytime, even after a \"good\" night's sleep?: Yes  Has anyone ever observed the patient stop breathing during their sleep? : No  Does the patient have or are they being treated for high blood pressure?: Yes  Is the patient's BMI greater than 35?: No  Is your neck circumference greater than 17 inches (Male) or 16 inches (Female)?: Yes  Is the patient older than 48?: Yes  Is the patient male?: Yes  NILAY Score: 6  Has the patient been referred to Sleep Medicine?: Yes  Has the patient previously been diagnosed with Obstructive Sleep Apnea?: Yes  Treated or Untreated?: Untreated                                POST OP SHOULDER SURGERY  CONTINUOUS SAT MONITOR UPON ARRIVAL TO ROOM. AIRVO FOR LUNG EXPANSION FOR 24 HOURS UPON ARRIVAL TO ROOM. PT IS AGREEABLE  RESPIRATORY ASSESSMENT Q SHIFT                                        Referrals:  [facility]  Pt.  Phone Number:  [phone]

## 2022-02-23 NOTE — PERIOP NOTES
PLEASE CONTINUE TAKING ALL PRESCRIPTION MEDICATIONS UP TO THE DAY OF SURGERY UNLESS OTHERWISE DIRECTED BELOW. DISCONTINUE all vitamins, herbals and supplements 21 days prior to surgery. DISCONTINUE Non-Steriodal Anti-Inflammatory (NSAIDS) such as Advil, Ibuprofen, and Aleve 5 days prior to surgery. Home Medications to HOLD      All vitamins, supplements, and herbals stop today. All NSAIDs such as Advil, Aleve, Ibuprofen, Diclofenac, Naproxen, etc. And aspirin (aspirin products) Stop 5 days prior to surgery. *IT IS OK TO TAKE TYLENOL*     Home Medications to take  the day of surgery   Gabapentin, Linzess, Protonix        Comments   *The day before surgery, 3/3/22, take Acetaminophen (Tylenol) 1000mg in the morning and again at bedtime. Can substitute 650mg Tylenol*   BRING: Januvia, Jardiance, bottle of soap (Dynahex), and incentive spirometer           Please do not bring home medications with you on the day of surgery unless otherwise directed by your nurse. If you are instructed to bring home medications, please give them to your nurse as they will be administered by the nursing staff. If you have any questions, please call [facility] [phone] or [facility] [phone]. Copy of above instructions given to patient.

## 2022-02-23 NOTE — PERIOP NOTES
Patient verified name and . Order for consent NOT found in EHR; patient verified. Type 3 surgery, walk-in assessment complete. Labs per surgeon: No orders received  Labs per anesthesia protocol: cbc, bmp; results pending. T&S DOS; order signed and held in EHR. EKG: Completed today; results to be reviewed by anesthesia. Charge nurse to f/u. Stress (3/3/21) located in EHR if needed. Patient COVID test date 3/1/22 at 0930; Patient aware. The testing center [facility] [address], [place]  and telephone number [phone] provided to the patient if not appointment found. MRSA/MSSA swab collected; pharmacy to review and dose antibiotic as appropriate. Hospital approved surgical skin cleanser and instructions to return bottle on DOS given per hospital policy. Patient provided with handouts including Guide to Surgery, Pain Management, Hand Hygiene, Blood Transfusion Education, and [facility] Brochure. Patient answered medical/surgical history questions at their best of ability. All prior to admission medications documented in [facility]. Original medication prescription bottle NOT visualized during patient appointment. Patient instructed to hold all vitamins, supplements, herbals 3 weeks prior to surgery and NSAIDS/ASA 5 days prior to surgery. Patient teach back successful and patient demonstrates knowledge of instruction.

## 2022-02-24 NOTE — PERIOP NOTES
Dr. Jaye Roland reviewed chart - EKGs 02/23/22 & 02/09/21 and Stress Test 03/03/21. No order received. Ok to proceed.

## 2022-03-02 ENCOUNTER — ANESTHESIA EVENT (OUTPATIENT)
Dept: SURGERY | Age: 62
End: 2022-03-02
Payer: COMMERCIAL

## 2022-03-03 NOTE — PERIOP NOTES
[address] [facility] Phone Call (performed day before scheduled surgery):    1. Have you have any exposure to anyone who has tested positive for COVID19 in the last 7 days? Patient Response: no      2.    Have you experienced any of the below symptoms in the last 48 hours?      -New onset respiratory symptoms                  -Fever or chills                  -Cough / Congestion / running nose                  -Shortness of breath or difficulty breathing                  -Headache                 -Sore Throat                 -New loss or taste or smell                 -Nausea / Vomiting / Diarrhea           Patient Response: no    Comments:

## 2022-03-04 ENCOUNTER — HOSPITAL ENCOUNTER (OUTPATIENT)
Age: 62
Setting detail: OBSERVATION
Discharge: HOME | End: 2022-03-05
Attending: ORTHOPAEDIC SURGERY | Admitting: ORTHOPAEDIC SURGERY
Payer: COMMERCIAL

## 2022-03-04 ENCOUNTER — APPOINTMENT (OUTPATIENT)
Dept: GENERAL RADIOLOGY | Age: 62
End: 2022-03-04
Attending: ORTHOPAEDIC SURGERY
Payer: COMMERCIAL

## 2022-03-04 ENCOUNTER — ANESTHESIA (OUTPATIENT)
Dept: SURGERY | Age: 62
End: 2022-03-04
Payer: COMMERCIAL

## 2022-03-04 DIAGNOSIS — M19.011 ARTHRITIS OF RIGHT SHOULDER REGION: ICD-10-CM

## 2022-03-04 DIAGNOSIS — M75.21 BICIPITAL TENDINITIS OF RIGHT SHOULDER: ICD-10-CM

## 2022-03-04 PROBLEM — Z96.611 HISTORY OF RIGHT SHOULDER REPLACEMENT: Status: ACTIVE | Noted: 2022-03-04

## 2022-03-04 LAB
ABO + RH BLD: NORMAL
BLOOD GROUP ANTIBODIES SERPL: NORMAL
EST. AVERAGE GLUCOSE BLD GHB EST-MCNC: 117 MG/DL
GLUCOSE BLD STRIP.AUTO-MCNC: 124 MG/DL (ref 65–100)
HBA1C MFR BLD: 5.7 % (ref 4.2–6.3)
HCT VFR BLD AUTO: 47.7 % (ref 41.1–50.3)
HGB BLD-MCNC: 15.9 G/DL (ref 13.6–17.2)
SERVICE CMNT-IMP: ABNORMAL
SPECIMEN EXP DATE BLD: NORMAL

## 2022-03-04 PROCEDURE — 77030039425 HC BLD LARYNG TRULITE DISP TELE -A: Performed by: REGISTERED NURSE

## 2022-03-04 PROCEDURE — 76010010054 HC POST OP PAIN BLOCK: Performed by: ORTHOPAEDIC SURGERY

## 2022-03-04 PROCEDURE — 77030003602 HC NDL NRV BLK BBMI -B: Performed by: REGISTERED NURSE

## 2022-03-04 PROCEDURE — 77030005514 HC CATH URETH FOL14 BARD -A: Performed by: ORTHOPAEDIC SURGERY

## 2022-03-04 PROCEDURE — 74011250636 HC RX REV CODE- 250/636: Performed by: ANESTHESIOLOGY

## 2022-03-04 PROCEDURE — G0378 HOSPITAL OBSERVATION PER HR: HCPCS

## 2022-03-04 PROCEDURE — 2709999900 HC NON-CHARGEABLE SUPPLY: Performed by: ORTHOPAEDIC SURGERY

## 2022-03-04 PROCEDURE — 77030011208: Performed by: ORTHOPAEDIC SURGERY

## 2022-03-04 PROCEDURE — 77030037400 HC ADH TISS HI VISC EXOFIN CHMP -B: Performed by: ORTHOPAEDIC SURGERY

## 2022-03-04 PROCEDURE — 74011250636 HC RX REV CODE- 250/636: Performed by: ORTHOPAEDIC SURGERY

## 2022-03-04 PROCEDURE — 76010000173 HC OR TIME 3 TO 3.5 HR INTENSV-TIER 1: Performed by: ORTHOPAEDIC SURGERY

## 2022-03-04 PROCEDURE — 77030006835 HC BLD SAW SAG STRY -B: Performed by: ORTHOPAEDIC SURGERY

## 2022-03-04 PROCEDURE — 77030018547 HC SUT ETHBND1 J&J -B: Performed by: ORTHOPAEDIC SURGERY

## 2022-03-04 PROCEDURE — 77030019908 HC STETH ESOPH SIMS -A: Performed by: REGISTERED NURSE

## 2022-03-04 PROCEDURE — 74011250637 HC RX REV CODE- 250/637: Performed by: PHYSICIAN ASSISTANT

## 2022-03-04 PROCEDURE — 94760 N-INVAS EAR/PLS OXIMETRY 1: CPT

## 2022-03-04 PROCEDURE — 86900 BLOOD TYPING SEROLOGIC ABO: CPT

## 2022-03-04 PROCEDURE — 74011000250 HC RX REV CODE- 250: Performed by: ORTHOPAEDIC SURGERY

## 2022-03-04 PROCEDURE — 74011250637 HC RX REV CODE- 250/637: Performed by: ANESTHESIOLOGY

## 2022-03-04 PROCEDURE — 74011250636 HC RX REV CODE- 250/636: Performed by: REGISTERED NURSE

## 2022-03-04 PROCEDURE — 76942 ECHO GUIDE FOR BIOPSY: CPT | Performed by: ORTHOPAEDIC SURGERY

## 2022-03-04 PROCEDURE — 77010033711 HC HIGH FLOW OXYGEN

## 2022-03-04 PROCEDURE — 77030037088 HC TUBE ENDOTRACH ORAL NSL COVD-A: Performed by: REGISTERED NURSE

## 2022-03-04 PROCEDURE — 77030036638 HC ACC KT GPS KNE V2 EXAC -D: Performed by: ORTHOPAEDIC SURGERY

## 2022-03-04 PROCEDURE — 74011000250 HC RX REV CODE- 250: Performed by: REGISTERED NURSE

## 2022-03-04 PROCEDURE — 77030002933 HC SUT MCRYL J&J -A: Performed by: ORTHOPAEDIC SURGERY

## 2022-03-04 PROCEDURE — 23472 RECONSTRUCT SHOULDER JOINT: CPT | Performed by: ORTHOPAEDIC SURGERY

## 2022-03-04 PROCEDURE — 77030002922 HC SUT FBRWRE ARTH -B: Performed by: ORTHOPAEDIC SURGERY

## 2022-03-04 PROCEDURE — 77030011283 HC ELECTRD NDL COVD -A: Performed by: ORTHOPAEDIC SURGERY

## 2022-03-04 PROCEDURE — 77030014006 HC SPNG HEMSTAT J&J -A: Performed by: ORTHOPAEDIC SURGERY

## 2022-03-04 PROCEDURE — 73030 X-RAY EXAM OF SHOULDER: CPT

## 2022-03-04 PROCEDURE — 77030039147 HC PWDR HEMSTS SURGICEL JNJ -D: Performed by: ORTHOPAEDIC SURGERY

## 2022-03-04 PROCEDURE — C1889 IMPLANT/INSERT DEVICE, NOC: HCPCS | Performed by: ORTHOPAEDIC SURGERY

## 2022-03-04 PROCEDURE — 94762 N-INVAS EAR/PLS OXIMTRY CONT: CPT

## 2022-03-04 PROCEDURE — 74011250637 HC RX REV CODE- 250/637: Performed by: ORTHOPAEDIC SURGERY

## 2022-03-04 PROCEDURE — 77030040361 HC SLV COMPR DVT MDII -B: Performed by: ORTHOPAEDIC SURGERY

## 2022-03-04 PROCEDURE — 82962 GLUCOSE BLOOD TEST: CPT

## 2022-03-04 PROCEDURE — 77030002934 HC SUT MCRYL J&J -B: Performed by: ORTHOPAEDIC SURGERY

## 2022-03-04 PROCEDURE — 74011250636 HC RX REV CODE- 250/636: Performed by: PHYSICIAN ASSISTANT

## 2022-03-04 PROCEDURE — 77030040922 HC BLNKT HYPOTHRM STRY -A: Performed by: REGISTERED NURSE

## 2022-03-04 PROCEDURE — C1713 ANCHOR/SCREW BN/BN,TIS/BN: HCPCS | Performed by: ORTHOPAEDIC SURGERY

## 2022-03-04 PROCEDURE — 76060000037 HC ANESTHESIA 3 TO 3.5 HR: Performed by: ORTHOPAEDIC SURGERY

## 2022-03-04 PROCEDURE — 74011000258 HC RX REV CODE- 258: Performed by: REGISTERED NURSE

## 2022-03-04 PROCEDURE — 77030040830 HC CATH URETH FOL MDII -A: Performed by: ORTHOPAEDIC SURGERY

## 2022-03-04 PROCEDURE — 77030018836 HC SOL IRR NACL ICUM -A: Performed by: ORTHOPAEDIC SURGERY

## 2022-03-04 PROCEDURE — 77010033678 HC OXYGEN DAILY

## 2022-03-04 PROCEDURE — 83036 HEMOGLOBIN GLYCOSYLATED A1C: CPT

## 2022-03-04 PROCEDURE — C1776 JOINT DEVICE (IMPLANTABLE): HCPCS | Performed by: ORTHOPAEDIC SURGERY

## 2022-03-04 PROCEDURE — 76210000006 HC OR PH I REC 0.5 TO 1 HR: Performed by: ORTHOPAEDIC SURGERY

## 2022-03-04 PROCEDURE — 85018 HEMOGLOBIN: CPT

## 2022-03-04 PROCEDURE — 23430 REPAIR BICEPS TENDON: CPT | Performed by: ORTHOPAEDIC SURGERY

## 2022-03-04 DEVICE — IMPLANTABLE DEVICE: Type: IMPLANTABLE DEVICE | Site: SHOULDER | Status: FUNCTIONAL

## 2022-03-04 DEVICE — SHOULDER S2 TOT ADV ANAT -- IMPL CAPPED S2: Type: IMPLANTABLE DEVICE | Status: FUNCTIONAL

## 2022-03-04 DEVICE — WIRE SMOOTH 0.62X9IN K: Type: IMPLANTABLE DEVICE | Site: SHOULDER | Status: FUNCTIONAL

## 2022-03-04 DEVICE — PIN FIX STEINMANN 3.2X178 MM STRL: Type: IMPLANTABLE DEVICE | Site: SHOULDER | Status: FUNCTIONAL

## 2022-03-04 DEVICE — CEMENT BNE 20ML 40GM FULL DOSE PMMA W/O ANTIBIO M VISC: Type: IMPLANTABLE DEVICE | Site: SHOULDER | Status: FUNCTIONAL

## 2022-03-04 RX ORDER — PANTOPRAZOLE SODIUM 40 MG/1
40 TABLET, DELAYED RELEASE ORAL DAILY
Status: DISCONTINUED | OUTPATIENT
Start: 2022-03-05 | End: 2022-03-05 | Stop reason: HOSPADM

## 2022-03-04 RX ORDER — LIDOCAINE HYDROCHLORIDE 10 MG/ML
0.1 INJECTION INFILTRATION; PERINEURAL AS NEEDED
Status: DISCONTINUED | OUTPATIENT
Start: 2022-03-04 | End: 2022-03-04 | Stop reason: HOSPADM

## 2022-03-04 RX ORDER — ALOGLIPTIN 25 MG/1
25 TABLET, FILM COATED ORAL DAILY
Status: DISCONTINUED | OUTPATIENT
Start: 2022-03-05 | End: 2022-03-05 | Stop reason: HOSPADM

## 2022-03-04 RX ORDER — ONDANSETRON 2 MG/ML
4 INJECTION INTRAMUSCULAR; INTRAVENOUS
Status: DISCONTINUED | OUTPATIENT
Start: 2022-03-04 | End: 2022-03-05 | Stop reason: HOSPADM

## 2022-03-04 RX ORDER — ACETAMINOPHEN 500 MG
1000 TABLET ORAL ONCE
Status: COMPLETED | OUTPATIENT
Start: 2022-03-04 | End: 2022-03-04

## 2022-03-04 RX ORDER — DIPHENHYDRAMINE HYDROCHLORIDE 50 MG/ML
12.5 INJECTION, SOLUTION INTRAMUSCULAR; INTRAVENOUS ONCE
Status: DISCONTINUED | OUTPATIENT
Start: 2022-03-04 | End: 2022-03-04 | Stop reason: HOSPADM

## 2022-03-04 RX ORDER — MIDAZOLAM HYDROCHLORIDE 1 MG/ML
2 INJECTION, SOLUTION INTRAMUSCULAR; INTRAVENOUS
Status: COMPLETED | OUTPATIENT
Start: 2022-03-04 | End: 2022-03-04

## 2022-03-04 RX ORDER — OXYCODONE HYDROCHLORIDE 5 MG/1
5 TABLET ORAL
Status: DISCONTINUED | OUTPATIENT
Start: 2022-03-04 | End: 2022-03-04 | Stop reason: HOSPADM

## 2022-03-04 RX ORDER — NALOXONE HYDROCHLORIDE 0.4 MG/ML
0.4 INJECTION, SOLUTION INTRAMUSCULAR; INTRAVENOUS; SUBCUTANEOUS
Status: DISCONTINUED | OUTPATIENT
Start: 2022-03-04 | End: 2022-03-05 | Stop reason: HOSPADM

## 2022-03-04 RX ORDER — MIDAZOLAM HYDROCHLORIDE 1 MG/ML
2 INJECTION, SOLUTION INTRAMUSCULAR; INTRAVENOUS ONCE
Status: DISCONTINUED | OUTPATIENT
Start: 2022-03-04 | End: 2022-03-04 | Stop reason: HOSPADM

## 2022-03-04 RX ORDER — ZOLPIDEM TARTRATE 5 MG/1
5 TABLET ORAL
Status: DISCONTINUED | OUTPATIENT
Start: 2022-03-04 | End: 2022-03-05 | Stop reason: HOSPADM

## 2022-03-04 RX ORDER — ROPIVACAINE HYDROCHLORIDE 5 MG/ML
INJECTION, SOLUTION EPIDURAL; INFILTRATION; PERINEURAL AS NEEDED
Status: DISCONTINUED | OUTPATIENT
Start: 2022-03-04 | End: 2022-03-04 | Stop reason: HOSPADM

## 2022-03-04 RX ORDER — SODIUM CHLORIDE, SODIUM LACTATE, POTASSIUM CHLORIDE, CALCIUM CHLORIDE 600; 310; 30; 20 MG/100ML; MG/100ML; MG/100ML; MG/100ML
100 INJECTION, SOLUTION INTRAVENOUS CONTINUOUS
Status: DISCONTINUED | OUTPATIENT
Start: 2022-03-04 | End: 2022-03-04 | Stop reason: HOSPADM

## 2022-03-04 RX ORDER — ACETAMINOPHEN 325 MG/1
975 TABLET ORAL ONCE
Status: DISCONTINUED | OUTPATIENT
Start: 2022-03-04 | End: 2022-03-04 | Stop reason: SDUPTHER

## 2022-03-04 RX ORDER — LIDOCAINE HYDROCHLORIDE 20 MG/ML
INJECTION, SOLUTION EPIDURAL; INFILTRATION; INTRACAUDAL; PERINEURAL AS NEEDED
Status: DISCONTINUED | OUTPATIENT
Start: 2022-03-04 | End: 2022-03-04 | Stop reason: HOSPADM

## 2022-03-04 RX ORDER — HYDROMORPHONE HYDROCHLORIDE 2 MG/ML
0.5 INJECTION, SOLUTION INTRAMUSCULAR; INTRAVENOUS; SUBCUTANEOUS
Status: DISCONTINUED | OUTPATIENT
Start: 2022-03-04 | End: 2022-03-04 | Stop reason: HOSPADM

## 2022-03-04 RX ORDER — VANCOMYCIN HYDROCHLORIDE 1 G/20ML
INJECTION, POWDER, LYOPHILIZED, FOR SOLUTION INTRAVENOUS AS NEEDED
Status: DISCONTINUED | OUTPATIENT
Start: 2022-03-04 | End: 2022-03-04 | Stop reason: HOSPADM

## 2022-03-04 RX ORDER — SODIUM CHLORIDE 0.9 % (FLUSH) 0.9 %
5-40 SYRINGE (ML) INJECTION AS NEEDED
Status: DISCONTINUED | OUTPATIENT
Start: 2022-03-04 | End: 2022-03-05 | Stop reason: HOSPADM

## 2022-03-04 RX ORDER — EPINEPHRINE 1 MG/ML
INJECTION INTRAMUSCULAR; INTRAVENOUS; SUBCUTANEOUS AS NEEDED
Status: DISCONTINUED | OUTPATIENT
Start: 2022-03-04 | End: 2022-03-04 | Stop reason: HOSPADM

## 2022-03-04 RX ORDER — ASPIRIN 325 MG
325 TABLET, DELAYED RELEASE (ENTERIC COATED) ORAL DAILY
Status: DISCONTINUED | OUTPATIENT
Start: 2022-03-05 | End: 2022-03-05 | Stop reason: HOSPADM

## 2022-03-04 RX ORDER — OXYCODONE HYDROCHLORIDE 5 MG/1
10 TABLET ORAL
Status: DISCONTINUED | OUTPATIENT
Start: 2022-03-04 | End: 2022-03-04 | Stop reason: HOSPADM

## 2022-03-04 RX ORDER — DEXAMETHASONE SODIUM PHOSPHATE 4 MG/ML
INJECTION, SOLUTION INTRA-ARTICULAR; INTRALESIONAL; INTRAMUSCULAR; INTRAVENOUS; SOFT TISSUE AS NEEDED
Status: DISCONTINUED | OUTPATIENT
Start: 2022-03-04 | End: 2022-03-04 | Stop reason: HOSPADM

## 2022-03-04 RX ORDER — PROPOFOL 10 MG/ML
INJECTION, EMULSION INTRAVENOUS AS NEEDED
Status: DISCONTINUED | OUTPATIENT
Start: 2022-03-04 | End: 2022-03-04 | Stop reason: HOSPADM

## 2022-03-04 RX ORDER — GABAPENTIN 300 MG/1
300 CAPSULE ORAL 3 TIMES DAILY
Status: DISCONTINUED | OUTPATIENT
Start: 2022-03-04 | End: 2022-03-05 | Stop reason: HOSPADM

## 2022-03-04 RX ORDER — DIPHENHYDRAMINE HCL 25 MG
25 CAPSULE ORAL
Status: DISCONTINUED | OUTPATIENT
Start: 2022-03-04 | End: 2022-03-05 | Stop reason: HOSPADM

## 2022-03-04 RX ORDER — SODIUM CHLORIDE 0.9 % (FLUSH) 0.9 %
5-40 SYRINGE (ML) INJECTION EVERY 8 HOURS
Status: DISCONTINUED | OUTPATIENT
Start: 2022-03-04 | End: 2022-03-05 | Stop reason: HOSPADM

## 2022-03-04 RX ORDER — ACETAMINOPHEN 650 MG/1
650 SUPPOSITORY RECTAL ONCE
Status: DISCONTINUED | OUTPATIENT
Start: 2022-03-04 | End: 2022-03-04 | Stop reason: SDUPTHER

## 2022-03-04 RX ORDER — AMOXICILLIN 250 MG
1 CAPSULE ORAL
Status: DISCONTINUED | OUTPATIENT
Start: 2022-03-04 | End: 2022-03-05 | Stop reason: HOSPADM

## 2022-03-04 RX ORDER — ROCURONIUM BROMIDE 10 MG/ML
INJECTION, SOLUTION INTRAVENOUS AS NEEDED
Status: DISCONTINUED | OUTPATIENT
Start: 2022-03-04 | End: 2022-03-04 | Stop reason: HOSPADM

## 2022-03-04 RX ORDER — NALOXONE HYDROCHLORIDE 0.4 MG/ML
0.1 INJECTION, SOLUTION INTRAMUSCULAR; INTRAVENOUS; SUBCUTANEOUS AS NEEDED
Status: DISCONTINUED | OUTPATIENT
Start: 2022-03-04 | End: 2022-03-04 | Stop reason: HOSPADM

## 2022-03-04 RX ORDER — GLYCOPYRROLATE 0.2 MG/ML
INJECTION INTRAMUSCULAR; INTRAVENOUS AS NEEDED
Status: DISCONTINUED | OUTPATIENT
Start: 2022-03-04 | End: 2022-03-04 | Stop reason: HOSPADM

## 2022-03-04 RX ORDER — HYDROMORPHONE HYDROCHLORIDE 1 MG/ML
.5-1 INJECTION, SOLUTION INTRAMUSCULAR; INTRAVENOUS; SUBCUTANEOUS
Status: DISCONTINUED | OUTPATIENT
Start: 2022-03-04 | End: 2022-03-05 | Stop reason: HOSPADM

## 2022-03-04 RX ORDER — OXYCODONE AND ACETAMINOPHEN 7.5; 325 MG/1; MG/1
1-2 TABLET ORAL
Status: DISCONTINUED | OUTPATIENT
Start: 2022-03-04 | End: 2022-03-05 | Stop reason: HOSPADM

## 2022-03-04 RX ORDER — CEFAZOLIN SODIUM/WATER 2 G/20 ML
2 SYRINGE (ML) INTRAVENOUS ONCE
Status: COMPLETED | OUTPATIENT
Start: 2022-03-04 | End: 2022-03-04

## 2022-03-04 RX ORDER — SODIUM CHLORIDE 9 MG/ML
100 INJECTION, SOLUTION INTRAVENOUS CONTINUOUS
Status: DISCONTINUED | OUTPATIENT
Start: 2022-03-04 | End: 2022-03-05 | Stop reason: HOSPADM

## 2022-03-04 RX ORDER — ONDANSETRON 2 MG/ML
4 INJECTION INTRAMUSCULAR; INTRAVENOUS ONCE
Status: DISCONTINUED | OUTPATIENT
Start: 2022-03-04 | End: 2022-03-04 | Stop reason: HOSPADM

## 2022-03-04 RX ORDER — CEFAZOLIN SODIUM/WATER 2 G/20 ML
2 SYRINGE (ML) INTRAVENOUS EVERY 8 HOURS
Status: COMPLETED | OUTPATIENT
Start: 2022-03-04 | End: 2022-03-05

## 2022-03-04 RX ORDER — NEOSTIGMINE METHYLSULFATE 1 MG/ML
INJECTION, SOLUTION INTRAVENOUS AS NEEDED
Status: DISCONTINUED | OUTPATIENT
Start: 2022-03-04 | End: 2022-03-04 | Stop reason: HOSPADM

## 2022-03-04 RX ORDER — ESMOLOL HYDROCHLORIDE 10 MG/ML
INJECTION INTRAVENOUS AS NEEDED
Status: DISCONTINUED | OUTPATIENT
Start: 2022-03-04 | End: 2022-03-04 | Stop reason: HOSPADM

## 2022-03-04 RX ORDER — ROPIVACAINE HYDROCHLORIDE 5 MG/ML
INJECTION, SOLUTION EPIDURAL; INFILTRATION; PERINEURAL
Status: COMPLETED | OUTPATIENT
Start: 2022-03-04 | End: 2022-03-04

## 2022-03-04 RX ORDER — KETOROLAC TROMETHAMINE 30 MG/ML
INJECTION, SOLUTION INTRAMUSCULAR; INTRAVENOUS AS NEEDED
Status: DISCONTINUED | OUTPATIENT
Start: 2022-03-04 | End: 2022-03-04 | Stop reason: HOSPADM

## 2022-03-04 RX ORDER — FENTANYL CITRATE 50 UG/ML
100 INJECTION, SOLUTION INTRAMUSCULAR; INTRAVENOUS ONCE
Status: COMPLETED | OUTPATIENT
Start: 2022-03-04 | End: 2022-03-04

## 2022-03-04 RX ORDER — ONDANSETRON 2 MG/ML
INJECTION INTRAMUSCULAR; INTRAVENOUS AS NEEDED
Status: DISCONTINUED | OUTPATIENT
Start: 2022-03-04 | End: 2022-03-04 | Stop reason: HOSPADM

## 2022-03-04 RX ORDER — LISINOPRIL AND HYDROCHLOROTHIAZIDE 10; 12.5 MG/1; MG/1
1 TABLET ORAL DAILY
Status: DISCONTINUED | OUTPATIENT
Start: 2022-03-05 | End: 2022-03-05 | Stop reason: HOSPADM

## 2022-03-04 RX ORDER — SODIUM CHLORIDE, SODIUM LACTATE, POTASSIUM CHLORIDE, CALCIUM CHLORIDE 600; 310; 30; 20 MG/100ML; MG/100ML; MG/100ML; MG/100ML
75 INJECTION, SOLUTION INTRAVENOUS CONTINUOUS
Status: DISCONTINUED | OUTPATIENT
Start: 2022-03-04 | End: 2022-03-04 | Stop reason: HOSPADM

## 2022-03-04 RX ADMIN — DEXAMETHASONE SODIUM PHOSPHATE 10 MG: 4 INJECTION, SOLUTION INTRAMUSCULAR; INTRAVENOUS at 12:59

## 2022-03-04 RX ADMIN — CEFAZOLIN SODIUM 2 G: 100 INJECTION, POWDER, LYOPHILIZED, FOR SOLUTION INTRAVENOUS at 19:35

## 2022-03-04 RX ADMIN — PROPOFOL 80 MG: 10 INJECTION, EMULSION INTRAVENOUS at 15:31

## 2022-03-04 RX ADMIN — Medication 2 MG: at 15:23

## 2022-03-04 RX ADMIN — LIDOCAINE HYDROCHLORIDE 60 MG: 20 INJECTION, SOLUTION EPIDURAL; INFILTRATION; INTRACAUDAL; PERINEURAL at 12:39

## 2022-03-04 RX ADMIN — GLYCOPYRROLATE 0.2 MG: 0.2 INJECTION, SOLUTION INTRAMUSCULAR; INTRAVENOUS at 13:12

## 2022-03-04 RX ADMIN — PROPOFOL 200 MG: 10 INJECTION, EMULSION INTRAVENOUS at 12:39

## 2022-03-04 RX ADMIN — ONDANSETRON 4 MG: 2 INJECTION INTRAMUSCULAR; INTRAVENOUS at 12:59

## 2022-03-04 RX ADMIN — Medication 3 AMPULE: at 10:26

## 2022-03-04 RX ADMIN — ROCURONIUM BROMIDE 10 MG: 10 INJECTION, SOLUTION INTRAVENOUS at 13:44

## 2022-03-04 RX ADMIN — ACETAMINOPHEN 1000 MG: 500 TABLET, FILM COATED ORAL at 10:24

## 2022-03-04 RX ADMIN — SODIUM CHLORIDE, SODIUM LACTATE, POTASSIUM CHLORIDE, AND CALCIUM CHLORIDE: 600; 310; 30; 20 INJECTION, SOLUTION INTRAVENOUS at 13:49

## 2022-03-04 RX ADMIN — GABAPENTIN 300 MG: 300 CAPSULE ORAL at 19:35

## 2022-03-04 RX ADMIN — ROCURONIUM BROMIDE 50 MG: 10 INJECTION, SOLUTION INTRAVENOUS at 12:39

## 2022-03-04 RX ADMIN — Medication 3 MG: at 15:18

## 2022-03-04 RX ADMIN — SODIUM CHLORIDE, SODIUM LACTATE, POTASSIUM CHLORIDE, AND CALCIUM CHLORIDE 100 ML/HR: 600; 310; 30; 20 INJECTION, SOLUTION INTRAVENOUS at 10:34

## 2022-03-04 RX ADMIN — TRANEXAMIC ACID 1 G: 100 INJECTION, SOLUTION INTRAVENOUS at 15:11

## 2022-03-04 RX ADMIN — GABAPENTIN 300 MG: 300 CAPSULE ORAL at 22:06

## 2022-03-04 RX ADMIN — Medication 2 G: at 12:46

## 2022-03-04 RX ADMIN — TRANEXAMIC ACID 1 G: 100 INJECTION, SOLUTION INTRAVENOUS at 13:04

## 2022-03-04 RX ADMIN — FENTANYL CITRATE 100 MCG: 50 INJECTION INTRAMUSCULAR; INTRAVENOUS at 11:49

## 2022-03-04 RX ADMIN — ROPIVACAINE HYDROCHLORIDE 30 ML: 5 INJECTION, SOLUTION EPIDURAL; INFILTRATION; PERINEURAL at 12:16

## 2022-03-04 RX ADMIN — GLYCOPYRROLATE 0.4 MG: 0.2 INJECTION, SOLUTION INTRAMUSCULAR; INTRAVENOUS at 15:18

## 2022-03-04 RX ADMIN — GLYCOPYRROLATE 0.2 MG: 0.2 INJECTION, SOLUTION INTRAMUSCULAR; INTRAVENOUS at 15:23

## 2022-03-04 RX ADMIN — ESMOLOL HYDROCHLORIDE 20 MG: 10 INJECTION, SOLUTION INTRAVENOUS at 15:41

## 2022-03-04 RX ADMIN — ZOLPIDEM TARTRATE 5 MG: 5 TABLET ORAL at 22:06

## 2022-03-04 RX ADMIN — MIDAZOLAM 2 MG: 1 INJECTION INTRAMUSCULAR; INTRAVENOUS at 11:49

## 2022-03-04 RX ADMIN — Medication 50 MCG/MIN: at 12:57

## 2022-03-04 NOTE — OP NOTES
Operative Note    Date of Surgery: 3/4/2022       Preoperative diagnosis:  Arthritis of right shoulder region [M19.011]    Postoperative diagnosis: Arthritis of right shoulder region [M19.011]    Surgeon(s) and Role:     Antonieta Cuevas MD - Primary    Assistant: Mu Oneal,  assist, assisted during the procedure. She was necessary for patient positioning, wound closure, and assistance with the major portions of the operation. Her presence decreased the operative time and potential complication rate. Anesthesia: General, regional block    Antibiotics: Ancef 2 grams IV , I gram vancomycin powder in soft tissues. Procedures:  Procedure(s):  SHOULDER ARTHROPLASTY/TOTAL/ RIGHT  BICEPS TENODESIS/ RIGH  right Total Shoulder arthroplasty [id number]  [id number]    Findings:  1. EUA -passive preoperative range of motion flexion 90 abduction 85 external rotation 10  2. Severe glenohumeral arthritic change in the right shoulder was noted. Large osteophytes circumferentially around the humerus were noted. The subscapularis and the rotator cuff superiorly was in good condition. He had significant tendinopathy of the biceps. Smoothing of the humeral head and glenoid was noted without evidence of significant cartilage presence. Posterior erosion was also noted with labral hypertrophy    Indications / Consent: This is a patient who has severe rightshoulder arthritis. After previous discussions and treatments using both conservative and/or non-operative treatment options the patient elected to proceed with surgery due to continued symptoms. A review of the risks and benefits, including but not limited to infection, stiffness, injury to nerves and vessels, DVT, PE, MI, need for further operations and other anesthesia related risks was performed with the patient. After this review and the review of the likely outcome and potential complications of the procedure, preoperative verbal and written consents were obtained. The operative procedure and postoperative course were discussed with the patient in detail and the extremity was marked by the patient and myself. Procedure Details: The patient was given an anesthetic, placed semi sitting, the head was held in a neutral position, the legs were flexed and pillows were placed under the legs. A santacruz was placed. Txa 1 gram IV was given by anesthesia. An EUA was performed and noted above. They were then padded and the operative shoulder was prepped with ChloraPrep and draped in the usual fashion. Prior to the beginning of the procedure, a time-out was performed for correct surgical site identification as was marked during the pre-operative meeting. This was confirmed using the written consent and history/physical. Time-out for antibiotic dosing, timing and selection was also performed. The operative arm was connected to an arm osorio. The incision site was injected with a mixture of epi, toradol and Naropin. A longitudinal incision was made from the clavicle over the coracoid and down in the deltopectoral interval. Dissection was carried down through the deltopectoral interval with bovie cauterization as needed. The cephalic vein was identified and freed up and retracted laterally. Dissection was carried down to the clavipectoral fascia and conjoined tendon. The subacromial space was then opened and a retractor was then placed under the acromion and under the deltoid. The conjoined tendon was freed up from the subscapularis anteriorly. A finger was placed on the axillary nerve and this position was confirmed. At this point a tonsil was placed into the rotator interval and the area of the subscap was identified superiorly. The pectoralis tendon was then partially incised about 1-2 cm from its superior border. The biceps tendon was then identified and released from the bicipital groove in an inferior to superior direction. The rotator interval was also released.  The biceps tendon was then secured to the conjoint tendon with #2 non-absorbable suture and then tenodesed. The three sisters or vessels were cauterized and the subscapularis and capsule was resected away from the proximal humerus with progressive external rotation in an  intra-tendonous fashion. Two #2 non-absorbable sutures were then used to tag the upper and lower subscapularis tendon. The shoulder was then progressively externally rotated and subluxated anteriorly. Osteophytes were removed from the humeral head using a ronguer. The remaining proximal biceps was released from the glenoid. The rotator cuff attachment was confirmed and its attachment on the tuberosity was visualized. A posterior glenoid retractor was placed to push the proximal humerus posteriorly. The anterior and inferior capsule was then dissected from the subscapularis. The subscapularis was pulled up and with a finger on the axillary nerve the capsule on the deep side of the subscapularis was cut so that the subscapularis was freed from the anterior glenoid rim. The capsule was then cut at the inferior glenoid, again with care to protect the axillary nerve running posteriorly. Attention was then turned to the proximal humeral cut. With retraction around the proximal humerus a cut was made in the proximal humerus with the [employer] cutting guide to remove the articular surface. It was felt that this cut was well placed to the level, but not into the rotator cuff. After the cut had been performed any other further trimming of osteophytes was then accomplished. The appropriate sizer for the humeral cut was then found for the stemless implant system. The central K wire was then drilled into the humerus until the lateral cortex was touched. IThe appropriate size cage was then selected based on this measurement. The sizing guide was then removed and the cannulated reamer was then used.   The appropriately sized punch was then placed with the superior fin at approximate 12:00. The punch was left in place and the humeral protector plate was then placed. After this had been done a Darrach retractor was placed on the proximal humerus levering on the posterior glenoid. After this had been done a posterior glenoid retractor was placed. The arm was externally rotated and extended and an anterior glenoid neck retractor was placed. Excessive labrum was removed at this point from around the glenoid. The glenoid was found to have hardly any cartilage. Soft tissue resection was performed around the glenoid and coracoid for the GPS tracking system preparation. The tracker was then placed on the coracoid and screwed into position with the two screws from the set. It was felt to have good purchase and was stable. The tracker tip was then used to sync the system. Once all points were obtained and felt to be a good representation of the anatomy the center drill was used. Using the targeting guide from the GPS system, the center hole was drilled according to the preoperative plan. The gps reamer was then used to prepare the glenoid for the preplanned implant position. The peripheral guide was then placed and the peripheral peg holes were drilled into position. The center drill was replaced to make sure with the reaming that the depth was correct then the depth gauge was used. Next the appropriately sized glenoid trial was placed with good fit. The holes were then irrigated. Gelfoam and thrombin was then packed into the holes and the shoulder was then protected with a gauze sponge. At this point the cement was mixed for the glenoid prosthesis and when it was at the appropriate time the Gelfoam was removed from the glenoid holes and cement was injected into the peripheral holes. It was then pressurized using the pressurizing tool.  The tool was then removed, the peripheral holes were then refilled with cement, the glenoid prosthesis with bone graft was then put into the glenoid and held until the cement was firm. At this point it was felt that the glenoid was firmly fixed into the glenoid scapular bone. The GPS tracker was removed from the coracoid. The retractors were removed, the shoulder was externally rotated. The protector plate for the humerus was then removed and the punch was removed. The final implant cage was then impacted with bone graft and impacted into position and felt to be stable. The trial heads that were of the 44 mm diameter were then placed onto the prosthesis. Once appropriate stability was felt to be obtained with the trials the torque screwdriver was used to finalize the position of the replicator plate. The corresponding prosthetic head was then selected and placed with appropriated version, inclination / coverage and hammered onto the [facility] FOR [facility] CARE [facility]. After the prosthesis was checked the shoulder was reduced. It was placed through a ROM and felt to have a good relationship with the glenoid. The shoulder was again irrigated. The fiberwires were then used with grasping suture bites and placed into the subscapularis. After these had all been placed they were then tied down. It was felt the subscapuarlis was very secure at this point and several #2 non-absorbable sutures were also sutured to the anterior subscapularis medial and lateral tendon sites and tied as well. A #2 non-absorbable suture was placed laterally into the rotator interval. On the table 15-20 degrees of easy external rotation was able to be obtained after the subscap had been repaired. At this point the shoulder was again irrigated. The soft tissues were injected with a mixture of epi, toradol and Naropin along the posterior capsule and along the subcutaneous tissue. The rest of the vanc powder was placed anterior to the subscapularis. Counts were correct. The subcutaneous tissue was closed with a 2.0 monocryl. The skin was closed with a subcuticular 3.0 Quill.  Dermabond was applied and a long sterile dressing was placed on the shoulder. The patient was put in a sling and returned to the recovery room in a satisfactory condition. There were no known intraoperative complications. Post-operative plan: Will begin TSA post op protocol with PROM in external rotation to 10 degrees. Estimated Blood Loss:   75 ml    Fluids:    see anesthesia record    Implant:   Implant Name Type Inv.  Item Serial No.  Lot No. LRB No. Used Action   WIRE SMOOTH 0.62X9IN K - [id number]  WIRE SMOOTH 0.62X9IN K  [employer]_ [id number] Right 2 Implanted   PIN FIX STEINMANN 3.2X178 MM STRL - [id number]  PIN FIX STEINMANN 3.2X178 MM STRL [id number] [employer]  Right 1 Implanted   CEMENT BNE RP FL DOSE 40GM -- SIMPLEX P SNGL/EA ONLY - [id number]  CEMENT BNE RP FL DOSE 40GM -- SIMPLEX P SNGL/EA ONLY  [employer] [facility]_ [id number] Right 1 Implanted   HEAD HUM STEMLESS SHT 44 MM SHLDR ALPHA EQUINOXE - [id number]  HEAD HUM STEMLESS SHT 44 MM SHLDR ALPHA EQUINOXE [id number] [employer]  Right 1 Implanted   CAGE HUM STEMLESS 3 EQUINOXE - [id number]  CAGE HUM STEMLESS 3 EQUINOXE [id number] [employer]  Right 1 Implanted       Closure: Primary    Complications: None    Signed By: Stephanie Castro MD

## 2022-03-04 NOTE — ANESTHESIA PREPROCEDURE EVALUATION
Anesthetic History   No history of anesthetic complications            Review of Systems / Medical History  Patient summary reviewed and pertinent labs reviewed    Pulmonary        Sleep apnea: No treatment           Neuro/Psych   Within defined limits           Cardiovascular    Hypertension: well controlled              Exercise tolerance: >4 METS     GI/Hepatic/Renal     GERD: poorly controlled           Endo/Other    Diabetes: well controlled, type 2    Obesity     Other Findings              Physical Exam    Airway  Mallampati: II  TM Distance: > 6 cm  Neck ROM: normal range of motion   Mouth opening: Normal     Cardiovascular  Regular rate and rhythm,  S1 and S2 normal,  no murmur, click, rub, or gallop  Rhythm: regular  Rate: normal         Dental  No notable dental hx       Pulmonary  Breath sounds clear to auscultation               Abdominal         Other Findings            Anesthetic Plan    ASA: 2  Anesthesia type: general      Post-op pain plan if not by surgeon: peripheral nerve block single    Induction: Intravenous  Anesthetic plan and risks discussed with: Patient and Family

## 2022-03-04 NOTE — PERIOP NOTES
TRANSFER - OUT REPORT:    Verbal report given to WINSTON Mullins on Sandoval Elliott  being transferred to Room 318 for routine progression of care       Report consisted of patients Situation, Background, Assessment and   Recommendations(SBAR). Information from the following report(s) SBAR, Procedure Summary, Intake/Output, MAR, Recent Results and Cardiac Rhythm SR was reviewed with the receiving nurse. Lines:   Peripheral IV 03/04/22 Left;Posterior Hand (Active)   Site Assessment Clean, dry, & intact 03/04/22 1620   Phlebitis Assessment 0 03/04/22 1620   Infiltration Assessment 0 03/04/22 1620   Dressing Status Clean, dry, & intact 03/04/22 1620   Dressing Type Tape;Transparent 03/04/22 1620   Hub Color/Line Status Green; Infusing;Patent 03/04/22 1620        Opportunity for questions and clarification was provided.       Patient transported with:   O2 @ 2 liters

## 2022-03-04 NOTE — PROGRESS NOTES
03/04/22 1659   Oxygen Therapy   O2 Sat (%) 94 %   Pulse via Oximetry 82 beats per minute   O2 Device Nasal cannula   O2 Flow Rate (L/min) 2 l/min   Joint Camp Notes Reviewed. Pt working on IS. Pt encouraged to do 10 breaths per hour while awake on IS. Good NPC. No respiratory distress noted at this time. No complications noted at this time.

## 2022-03-04 NOTE — DISCHARGE INSTRUCTIONS
Total Shoulder Replacement Postoperative Instructions    Returning Home    Care of your incisions: You have absorbable stitches which do not need to be removed. Your incision will be checked at your first visit. 1) Moderate bleeding may occur at the incision sites. This should decrease quickly over time. 2) Leave the dressings in place for 5-7 days. Then dressings may be removed and replaced with fresh gauze if there is any drainage. You may bath or shower but the incisions must be kept clean and dry. You may take your arm out of the sling for showering or bathing but being careful to avoid use of the arm. You may let your arm also hang at your side during showering or at your side during bathing. 3) Watch the wound for increasing redness, tenderness, swelling and pus drainage daily. These can be early signs of infection. Please communicate any concerns. 4) A slight temperature the first few days after surgery is not uncommon. This can be treated with deep breathing and coughing to clear the lungs. However, fevers (anything over 101°F), increasing pain, and swelling at the incisions should be reported immediately. 5) Keep the wounds dry until your first follow-up visit. 6) It is fine to loosen the sling and do elbow straightening and bending with the arm at the side. It is also good to move the wrist and hand. This can be done as much as you desire, but at least three times a day. You are to wear sling at all times except when doing the exercises as above and showers. 7) Deep vein thrombosis (DVT) is a condition in which a blood clot has formed in a deep vein of the leg or arm. This may result in redness, swelling, warmth and/or pain of the leg/arm. Although these are very rare, there are things that can be done to lessen the risk.   It is recommended by your surgeon unless indicated otherwise, after arthroscopy; take an adult aspirin once a day for three weeks after surgery to help prevent the formation of blood clots. (Do not take aspirin against the advice of your medical doctor). Pain Management:    Pain after the surgery will vary from patient to patient. A certain amount of discomfort is normal and should not be alarming. We do recommend starting your prescription pain medications once you begin to experience a moderate amount of pain. If no relief is obtained call the office number on this sheet. Many pain medications contain Tylenol. Do not take additional Tylenol while taking the prescribed pain medication. Pain medications can cause constipation, so keep hydrated and consider over the counter additions like Metamucil or stool softener. Pain medications and antibiotics that are given during the james-operative time can cause itching and hives; over the counter Benadryl (25mg every 6 hours) can be helpful in treating this. If your pain is not adequately controlled, contact your physician at the numbers on this sheet. For the first week:  1) Icing for 15 minutes at a time may reduce your pain. Allow at least 30 minutes between ice applications. Some patients may have a cryotherapy cooler which can be used as it was during your stay at the hospital. It is fine to wear a light shirt underneath if needed to prevent skin irritation. 2) Sleeping might be difficult. Some individuals find sleeping in a recliner or inclined with pillows or a foam wedge helpful. 3) Putting a mitten on the hand of the affected shoulder can be helpful since some individuals find that warm hands help decrease pain. During this time nausea and light-headedness are common and should improve in 2-5 days. Drinking fluids and eating may help. If nausea persists, medicine can be prescribed by calling your doctor on the number(s) listed. Follow-up visits  Doctor  Plan on seeing your surgeon 10 days or so after surgery.     Physical Therapy   Physical therapy will be set up on an individualized basis. ? You will want to line up a helper to assist you with your home therapy program for approximately the first 6 weeks. Optimally, this person can come to your first physical therapy visit to have the physical therapist show them how to do the home therapy. The home therapy exercises will need to be done daily for approximately 20 minutes. Your helper doesnt need any medical training; the therapist will show them what they need to know. If you call the office please have us paged instead of leaving a voice mail so that we can immediately take care of your needs.     [id number]tiumDignity Health East Valley Rehabilitation HospitalField Agent                 Phone [phone]  [place] [address]                 Fax [phone]            Jack Becker

## 2022-03-04 NOTE — H&P
History and Physical    Patient: Yareli Padgett MRN: [id number]  SSN: [id number]    YOB: 1960  Age: 57 y.o. Sex: male      Subjective:      Yareli Padgett is a 57 y.o. male who has a chronic history of both left and right shoulder pain and loss of motion. He had previously received multiple glenohumeral injections without significant relief and after failure and exhaustion of all conservative options with anticipating proceeding with shoulder replacement. He has obtained a CT and been evaluated for surgical stabilization.     Past Medical History:   Diagnosis Date    Elevated triglycerides with high cholesterol 4/16/2013    GERD (gastroesophageal reflux disease)     managed with medication     History of colon polyps 4/16/2013    8/10    Hypertension     medication    IBS (irritable bowel syndrome)     Insomnia     controlled with  Ambien    Knee pain, bilateral     Low testosterone 7/5/2017    Shoulder pain, bilateral     cortisone injections     Tendonitis     left shoulder    Testosterone deficiency     using testosterone gel    Type 2 diabetes mellitus ([facility] [address]     pt states pre-diabetic, FBS , A1c 5.7 (10/2021), denies hypoglycemia    Unspecified sleep apnea 2011    pt says this is questionable, pt is not using CPAP (noted 2/23/22)     Vertigo 2021    hx      Past Surgical History:   Procedure Laterality Date    HX CATARACT REMOVAL Bilateral     IOL     HX COLONOSCOPY  10/20/2021    HX GI  2001, 2010    colonoscopy    HX LAP CHOLECYSTECTOMY  05/2021    HX ORTHOPAEDIC  1970's    tendon graft right hand    HX OTHER SURGICAL      fatty tumor back of neck      Family History   Problem Relation Age of Onset    Diabetes Mother     Hypertension Mother     Other Mother         severe post op N&V x1 after hip surgery    Heart Disease Mother     Hypertension Sister     Cancer Father         sinus cavity tumor    Heart Disease Father         congestive heart failure    Diabetes Brother      Social History     Tobacco Use    Smoking status: Never Smoker    Smokeless tobacco: Never Used   Substance Use Topics    Alcohol use: No      Prior to Admission medications    Medication Sig Start Date End Date Taking? Authorizing Provider   aspirin (ASPIRIN) 325 mg tablet Take 1 Tablet by mouth two (2) times a day for 7 days. To start after surgery 3/2/22 3/9/22  Jessica Diamond PA   oxyCODONE-acetaminophen (PERCOCET 7.5) 7.5-325 mg per tablet Take 1-2 Tablets by mouth every four to six (4-6) hours as needed for Pain for up to 3 days. Max Daily Amount: 12 Tablets. 3/2/22 3/5/22  Juan Diamond PA   senna-docusate (PERICOLACE) 8.6-50 mg per tablet Take 1 Tablet by mouth daily. To start after surgery  Indications: constipation 3/2/22   Juan Diamond PA   ondansetron (ZOFRAN ODT) 4 mg disintegrating tablet 1 Tablet by SubLINGual route every six (6) hours as needed for Nausea or Nausea or Vomiting. To start after surgery  Indications: prevent nausea and vomiting after surgery 3/2/22   Juan Diamond PA   naloxegoL (MOVANTIK) 25 mg tab tablet Take 1 Tablet by mouth Daily (before breakfast) for 7 days. To start after surgery 3/2/22 3/9/22  Jessica Diamond PA   cyanocobalamin (Vitamin B-12) 1,000 mcg tablet Take 1,000 mcg by mouth daily. Provider, Historical   ibuprofen (MOTRIN) 400 mg tablet Take  by mouth every six (6) hours as needed for Pain. Provider, Historical   linaCLOtide (Linzess) 145 mcg cap capsule Take 1 Capsule by mouth Daily (before breakfast). 1/26/22   Anoop Walker MD   SITagliptin (JANUVIA) 100 mg tablet Take 1 Tablet by mouth daily. 1/25/22   Anoop Walker MD   pantoprazole (PROTONIX) 40 mg tablet Take 1 Tablet by mouth daily. TAKE 1 TABLET BY MOUTH  DAILY 1/25/22   Anoop Walker MD   lisinopril-hydroCHLOROthiazide (PRINZIDE, ZESTORETIC) 10-12.5 mg per tablet Take 1 Tablet by mouth daily.  1/25/22   Anoop Walker MD   gabapentin (NEURONTIN) 300 mg capsule Take 1 Capsule by mouth three (3) times daily. 1/25/22   Berry Greenberg MD   ergocalciferol (Vitamin D2) 1,250 mcg (50,000 unit) capsule Take 1 Capsule by mouth every seven (7) days. 1/25/22   Berry Greenberg MD   empagliflozin (Jardiance) 25 mg tablet Take 1 Tablet by mouth daily. 1/25/22   Berry Greenberg MD   zolpidem CR (AMBIEN CR) 12.5 mg tablet Take 1 Tablet by mouth nightly as needed for Sleep. Max Daily Amount: 12.5 mg. 1/18/22   Berry Greenberg MD   glucose blood VI test strips (OneTouch Verio test strips) strip Check daily for diabetes mellitus E11.9 10/25/21   Berry Greenberg MD   Blood-Glucose Meter (OneTouch Verio Meter) misc Check daily for diabetes mellitus E11.9 10/25/21   Berry Greenberg MD   OTHER Testosterone cream 60 mg/ ml  Apply one mL daily 9/14/21   Berry Greenberg MD   lancets ([facility]) 33 gauge misc Check daily for diabetes mellitus E11.9 7/29/19   Brery Greenberg MD        No Known Allergies    Review of Systems:  Pertinent items are noted in the History of Present Illness. Objective:     Visit Vitals  /75 (BP 1 Location: Left upper arm)   Pulse 69   Temp 97.5 °F (36.4 °C)   Resp 16   Wt 225 lb 12.8 oz (102.4 kg)   SpO2 95%   BMI 30.62 kg/m²         Physical Exam:  GEN: General appearance is that of a healthy patient, alert and oriented, in no distress. WDWN. HEENT:  Normocephalic, atraumatic. Extraocular muscles are intact. Neck:  Supple, no lymphadenopathy. Heart:  Regular pulse exam on all extremities. Good color and warmth noted. Lungs:  Normal non-labored breathing with no obvious shortness of breath. Abdomen:  WNL's. Skin:  No signs of rash or bruising. Pysch: Answers questions appropriately, AO X 3. MSK:  Cervical spine: No tenderness. Normal active motion.  - Spurling's maneuver.      SHOULDER   Left   Right   Skin Intact Intact   Radial Pulses 2+ symmetrical  2+ symmetrical   Myotomes Normal Normal Dermatomes  Normal Normal   ROM  flexion 90, abduction 70, external rotation -5  restricted, flexion 85 abduction 60, external rotation neutral   Strength  5 -/5 throughout  same   Atrophy None noted None noted   Effusion/Swelling  None None   Palpation  minimal anterior minimal anterior   Bicep Tendon Rupture  - -   Bear Hug, Belly Press - -   Crossed Arm Adduction Test Not tested Not tested   Instability/Ant. Apprehension Test None  None   Impingement  restricted  same              CT right shoulder from 11-23-21:  FINDINGS: Large marginal osteophyte noted along the interarticular margin of the humerus. Patchy areas of subchondral sclerosis noted along the articular margin of the humeral head. A few small erosions are seen.     Several small intermediate sized erosion is noted in the greater tuberosity.     Mild bony hypertrophy of the glenoid margin. A few small erosions are seen.     There is asymmetric joint narrowing with vacuum phenomenon in the glenohumeral joint. Severe inferior glenohumeral chondromalacia noted.     Small joint effusion.     No loose bodies.     Glenoid version estimated 14 degrees retroversion.     Glenoid stock estimated at 25 mm.     Walch type A81 glenoid configuration noted.     No significant degenerative change of the [facility] joint. Vacuum phenomenon the [facility] joint noted. No significant spur formation is seen along the undersurface.     No full-thickness tear of the rotator cuff.     No muscle belly atrophy is seen. Bulk of the deltoid muscle intact.     New of the bony shoulder girdle and bony thorax demonstrates no acute suspicious findings.     The imaged lung is unremarkable.     No axillary adenopathy.     Agree there is good bone stock. The version is manageable with augmentation posteriorly. No gross cystic change.   Rotator cuff spacing appears normal.  Marginal moderate-sized osteophytes on the inferior humerus    Assessment:     Hospital Problems  Date Reviewed: 3/2/2022 Codes Class Noted POA    * (Principal) Arthritis of right shoulder region ICD-10-CM: M19.011  ICD-9-CM: 716.91  12/7/2021 Yes              Plan:     The patient desires a  Right total shoulder replacement and possible biceps subpectoral tenodesis, after they have exhausted all conservative options. I talked with them extensively about the risks, benefits, reasonable expectations and expected recovery time as well as possible complications including but not limited to bleeding, infection, wear of the components requiring revision, neurovascular injury, instability/dislocations, cosmetic deformity, stiffness, pain, continued problems, DVT, PE, hardware failure,  fracture, heterotopic ossification, MI and other anesthesia related risks, etc.   In the office I gave them education literature and answered their questions. They seem to understand and wish to proceed. The patient was counseled at length about the risks of jame Covid-19 during their perioperative period and any recovery window from their procedure. The patient was made aware that jame Covid-19  may worsen their prognosis for recovering from their procedure and lend to a higher morbidity and/or mortality risk. All material risks, benefits, and reasonable alternatives including postponing the procedure were discussed. The patient does  wish to proceed with the procedure at this time.       Signed By: John Hoyt MD     March 4, 2022

## 2022-03-04 NOTE — BRIEF OP NOTE
Brief Postoperative Note    Patient: Alex Resendiz  YOB: 1960  MRN: [id number]    Date of Procedure: 3/4/2022     Pre-Op Diagnosis: Arthritis of right shoulder region [M19.011]    Post-Op Diagnosis: Same as preoperative diagnosis. Procedure(s):  SHOULDER ARTHROPLASTY/TOTAL/ RIGHT  BICEPS TENODESIS/ RIGH    Surgeon(s):  Olivia Hernandez MD    Surgical Assistant: None    Anesthesia: General     Estimated Blood Loss (mL): 75 ml    Complications: None    Specimens: * No specimens in log *     Implants:   Implant Name Type Inv.  Item Serial No.  Lot No. LRB No. Used Action   WIRE SMOOTH 0.62X9IN K - [id number]  WIRE SMOOTH 0.62X9IN K  [employer]WD [id number] Right 2 Implanted   PIN FIX STEINMANN 3.2X178 MM STRL - [id number]  PIN FIX STEINMANN 3.2X178 MM STRL [id number] [employer]  Right 1 Implanted   CEMENT BNE RP FL DOSE 40GM -- SIMPLEX P SNGL/EA ONLY - [id number]  CEMENT BNE RP FL DOSE 40GM -- SIMPLEX P SNGL/EA ONLY  [employer] [facility]_ [id number] Right 1 Implanted   HEAD HUM STEMLESS SHT 44 MM SHLDR ALPHA EQUINOXE - [id number]  HEAD HUM STEMLESS SHT 44  [facility] [id number] [employer]WD  Right 1 Implanted   CAGE HUM STEMLESS 3 EQUINOXE - [id number]  CAGE HUM STEMLESS 3 EQUINOXE [id number] [employer]WD  Right 1 Implanted       Drains: * No LDAs found *    Findings: See operative note    Electronically Signed by Raul Miner MD on 3/4/2022 at 3:40 PM

## 2022-03-05 VITALS
TEMPERATURE: 97.9 F | HEIGHT: 72 IN | WEIGHT: 225.8 LBS | SYSTOLIC BLOOD PRESSURE: 136 MMHG | RESPIRATION RATE: 18 BRPM | HEART RATE: 93 BPM | OXYGEN SATURATION: 98 % | DIASTOLIC BLOOD PRESSURE: 84 MMHG | BODY MASS INDEX: 30.58 KG/M2

## 2022-03-05 LAB
HCT VFR BLD AUTO: 48.2 % (ref 41.1–50.3)
HGB BLD-MCNC: 15.5 G/DL (ref 13.6–17.2)

## 2022-03-05 PROCEDURE — 85018 HEMOGLOBIN: CPT

## 2022-03-05 PROCEDURE — 74011250636 HC RX REV CODE- 250/636: Performed by: ORTHOPAEDIC SURGERY

## 2022-03-05 PROCEDURE — G0378 HOSPITAL OBSERVATION PER HR: HCPCS

## 2022-03-05 PROCEDURE — 36415 COLL VENOUS BLD VENIPUNCTURE: CPT

## 2022-03-05 PROCEDURE — 74011000250 HC RX REV CODE- 250: Performed by: ORTHOPAEDIC SURGERY

## 2022-03-05 PROCEDURE — 97535 SELF CARE MNGMENT TRAINING: CPT

## 2022-03-05 PROCEDURE — 97530 THERAPEUTIC ACTIVITIES: CPT

## 2022-03-05 PROCEDURE — 97161 PT EVAL LOW COMPLEX 20 MIN: CPT

## 2022-03-05 PROCEDURE — 74011250637 HC RX REV CODE- 250/637: Performed by: ORTHOPAEDIC SURGERY

## 2022-03-05 PROCEDURE — 97165 OT EVAL LOW COMPLEX 30 MIN: CPT

## 2022-03-05 RX ADMIN — CEFAZOLIN SODIUM 2 G: 100 INJECTION, POWDER, LYOPHILIZED, FOR SOLUTION INTRAVENOUS at 00:39

## 2022-03-05 RX ADMIN — GABAPENTIN 300 MG: 300 CAPSULE ORAL at 09:43

## 2022-03-05 RX ADMIN — PANTOPRAZOLE SODIUM 40 MG: 40 TABLET, DELAYED RELEASE ORAL at 09:43

## 2022-03-05 RX ADMIN — OXYCODONE HYDROCHLORIDE AND ACETAMINOPHEN 2 TABLET: 7.5; 325 TABLET ORAL at 10:29

## 2022-03-05 RX ADMIN — OXYCODONE HYDROCHLORIDE AND ACETAMINOPHEN 2 TABLET: 7.5; 325 TABLET ORAL at 06:07

## 2022-03-05 RX ADMIN — ASPIRIN 325 MG: 325 TABLET, COATED ORAL at 09:43

## 2022-03-05 RX ADMIN — LISINOPRIL AND HYDROCHLOROTHIAZIDE 1 TABLET: 12.5; 1 TABLET ORAL at 09:43

## 2022-03-05 NOTE — PROGRESS NOTES
Problem: Falls - Risk of  Goal: *Absence of Falls  Description: Document Aysha Embs Fall Risk and appropriate interventions in the flowsheet.   Outcome: Resolved/Met  Note: Fall Risk Interventions:  Mobility Interventions: OT consult for ADLs,Patient to call before getting OOB,PT Consult for mobility concerns,PT Consult for assist device competence,Strengthening exercises (ROM-active/passive),Utilize walker, cane, or other assistive device         Medication Interventions: Patient to call before getting OOB,Teach patient to arise slowly    Elimination Interventions: Call light in reach,Elevated toilet seat,Patient to call for help with toileting needs              Problem: Patient Education: Go to Patient Education Activity  Goal: Patient/Family Education  Outcome: Resolved/Met     Problem: Patient Education: Go to Patient Education Activity  Goal: Patient/Family Education  Outcome: Resolved/Met     Problem: Patient Education: Go to Patient Education Activity  Goal: Patient/Family Education  Outcome: Resolved/Met     Problem: Upper Extremity Surgical Pathway: Day of Surgery  Goal: Off Pathway (Use only if patient is Off Pathway)  Outcome: Resolved/Met  Goal: Activity/Safety  Outcome: Resolved/Met  Goal: Consults, if ordered  Outcome: Resolved/Met  Goal: Diagnostic Test/Procedures  Outcome: Resolved/Met  Goal: Nutrition/Diet  Outcome: Resolved/Met  Goal: Medications  Outcome: Resolved/Met  Goal: Respiratory  Outcome: Resolved/Met  Goal: Treatments/Interventions/Procedures  Outcome: Resolved/Met  Goal: Psychosocial  Outcome: Resolved/Met  Goal: *Demonstrates progressive activity  Outcome: Resolved/Met  Goal: *Optimal pain control at patient's stated goal  Outcome: Resolved/Met  Goal: *Hemodynamically stable  Outcome: Resolved/Met  Goal: *Labs within defined limits  Outcome: Resolved/Met     Problem: Upper Extremity Surgical Pathway: POD 1  Goal: Off Pathway (Use only if patient is Off Pathway)  Outcome: Resolved/Met  Goal: Activity/Safety  Outcome: Resolved/Met  Goal: Diagnostic Test/Procedures  Outcome: Resolved/Met  Goal: Nutrition/Diet  Outcome: Resolved/Met  Goal: Discharge Planning  Outcome: Resolved/Met  Goal: Medications  Outcome: Resolved/Met  Goal: Respiratory  Outcome: Resolved/Met  Goal: Treatments/Interventions/Procedures  Outcome: Resolved/Met  Goal: Psychosocial  Outcome: Resolved/Met     Problem: Upper Extremity Surgical Pathway: Discharge Outcomes  Goal: *Verbalizes name, dosage, time, side effects, and number of days to continue medications  Outcome: Resolved/Met  Goal: *Describes available resources and support systems  Outcome: Resolved/Met  Goal: *Describes follow-up/return visits to physicians  Outcome: Resolved/Met  Goal: *Tolerating diet  Outcome: Resolved/Met  Goal: *Optimal pain control at patient's stated goal  Outcome: Resolved/Met  Goal: *Lungs clear or at baseline  Outcome: Resolved/Met  Goal: *Afebrile  Outcome: Resolved/Met  Goal: *Incision intact without signs of infection, redness, warmth  Outcome: Resolved/Met  Goal: *Absence of deep venous thrombosis signs and symptoms(Stroke Metric)  Outcome: Resolved/Met  Goal: *Active bowel function  Outcome: Resolved/Met  Goal: *Adequate urinary output  Outcome: Resolved/Met  Goal: *Discharge anxiety minimal  Outcome: Resolved/Met  Goal: *Describes available resources and support systems  Outcome: Resolved/Met  Goal: *Labs within defined limits  Outcome: Resolved/Met  Goal: *Hemodynamically stable  Outcome: Resolved/Met  Goal: *Demonstrates ability to don and doff sling/swath/positioning aid  Outcome: Resolved/Met  Goal: *Modified independence with transfers, ambulation on levels with assistance devices, stair climbing, ADL's  Outcome: Resolved/Met  Goal: *Demonstrates independence with home exercise program  Outcome: Resolved/Met

## 2022-03-05 NOTE — PROGRESS NOTES
2022         Post Op day: 1 Day Post-Op     Admit Date: 3/4/2022  Admit Diagnosis: Arthritis of right shoulder region [M19.011]; Localized osteoarthritis of right shoulder [M19.011]; History of right shoulder replacement [Z96.611]        Subjective: Patient is status-post Procedure(s) (LRB):  SHOULDER ARTHROPLASTY/TOTAL/ RIGHT (Right)  BICEPS TENODESIS/ RIGH (Right). Patient doing well. No concerns/complaints. No shortness of breath, chest pain or nausea/vomiting. Block wore off this morning but pain is well controlled. Only complaint is dry mouth     Objective:   Visit Vitals  /84 (BP 1 Location: Left upper arm, BP Patient Position: At rest;Supine)   Pulse 93   Temp 97.9 °F (36.6 °C)   Resp 18   Ht 6' (1.829 m)   Wt 225 lb 12.8 oz (102.4 kg)   SpO2 98%   BMI 30.62 kg/m²    Temp (24hrs), Av.8 °F (36.6 °C), Min:97.5 °F (36.4 °C), Max:98 °F (36.7 °C)    Lab Results   Component Value Date/Time    HGB 15.5 2022 04:30 AM     Extremity Exam  Right shoulder dressing Clean, dry, intact. Neuro intact  right upper extremity  Sensation intact to light touch  Extremity perfused, possible pulse  No sign of DVT     Assessment / Plan :  S/p Procedure(s) (LRB):  SHOULDER ARTHROPLASTY/TOTAL/ RIGHT (Right)  BICEPS TENODESIS/ RIGH (Right)  Continue current postoperative plan  PT/OT-Continue current weightbearing status and restrictions of involved extremities  Continue current VTE prophylaxis  DIspo-discharge home today follow total shoulder arthroplasty protocol limit external rotation to 10 degrees  Patient Active Problem List   Diagnosis Code    IBS (irritable bowel syndrome) K58.9    Elevated triglycerides with high cholesterol E78.2    History of colon polyps Z86.010    Allergic rhinitis, cause unspecified J30.9    Low testosterone R79.89    Shoulder pain, bilateral M25.511, M25.512    Knee pain, bilateral M25.561, M25.562    Severe obesity (BMI 35.0-39. 9) E66.01    Elevated hemoglobin A1c R73.09    Precordial pain R07.2    Gastroesophageal reflux disease without esophagitis K21.9    Cold hand without peripheral vascular disease R20.9    Calculus of gallbladder without cholecystitis without obstruction K80.20    Type 2 diabetes mellitus E11.9    Arthritis of right shoulder region M19.011    Paresthesia of both feet R20.2    Dysesthesia R20.8    Encounter for medication management Z79.899    Abnormal clinical finding R68.89    Neuropathic pain M79.2    Localized osteoarthritis of right shoulder M19.011    History of right shoulder replacement Z96.611          Signed By: Lisseth Pratt MD

## 2022-03-05 NOTE — PROGRESS NOTES
Problem: Self Care Deficits Care Plan (Adult)  Goal: *Acute Goals and Plan of Care (Insert Text)  Outcome: Resolved/Met  Note: DISCHARGE GOALS (in preparation for going home/rehab): 3 days  1. Mr. Jacinda Phelps will perform lower body and upper body dressing activity to include donning and doffing sling with minimal assistance with one handed technique to demonstrate improved functional mobility and safety. 2. Mr. Jacinda Phelps will perform lower and upper body body bathing activity with minimal assistance with one handed technique to demonstrate improved functional mobility and safety. 3. Mr. Jacinda Phelps will perform toileting/toilet transfer with stand by assistance with adaptive equipment and one handed technique to demonstrate improved functional mobility and safety. 4. Mr. Jacinda Phelps will perform shower transfer with stand by assistance with adaptive equipment to demonstrate improved functional mobility and safety. 5. Mr. Jacinda Phelps will maintain shoulder restrictions during self care with two verbal cues to demonstrate improved functional mobility and safety. JOINT CAMP OCCUPATIONAL THERAPY TSA: Initial Assessment, Daily Note, Treatment Day: Day of Assessment, and AM 3/5/2022  OBSERVATION: Hospital Day: 2  Payor: BLUE CHOICE / [place] / Product Type: HMO /      NAME/AGE/GENDER: Elizabeth Bryan is a 57 y.o. male   PRIMARY DIAGNOSIS:  Arthritis of right shoulder region [M19.011]   Procedure(s) and Anesthesia Type:     * SHOULDER ARTHROPLASTY/TOTAL/ RIGHT - General     * BICEPS TENODESIS/ RIGH - General (Right  Right)  ICD-10: Treatment Diagnosis:    Pain in Right Shoulder (M25.511)  Stiffness of Right Shoulder, Not elsewhere classified (M25.611)      ASSESSMENT:     Mr. Yazmin Ramirez is s/p right TSA and presents with decreased weight bearing on right UE and decreased independence with functional mobility and activities of daily living as compared to prior level of function and safety.   Patient would benefit from skilled Occupational Therapy to maximize independence and safety with self-care task and functional mobility. Pt would also benefit from education on upper & lower body adaptive equipment and shoulder precautions post-surgery in preparation for going home. Patient plans for good family support from wige once home. OT reviewed therapy schedule and plan of care with patient. Patient was able to transfer and perform self care skills as charted below. Patient instructed to call for assistance when needing to get up from the recliner and all needs in reach. Patient verbalized understanding of call light. This section established at most recent assessment   PROBLEM LIST (Impairments causing functional limitations):  Decreased Strength  Decreased ADL/Functional Activities  Decreased Transfer Abilities  Increased Pain  Increased Fatigue  Decreased Flexibility/Joint Mobility  Decreased Knowledge of Precautions   INTERVENTIONS PLANNED: (Benefits and precautions of occupational therapy have been discussed with the patient.)  Activities of daily living training  Adaptive equipment training  Balance training  Clothing management  Donning&doffing training  Theraputic activity     TREATMENT PLAN: Frequency/Duration: Follow patient 1 time to address above goals. Rehabilitation Potential For Stated Goals: Good     RECOMMENDED REHABILITATION/EQUIPMENT: (at time of discharge pending progress): Continue Skilled Therapy. OCCUPATIONAL PROFILE AND HISTORY:   History of Present Injury/Illness (Reason for Referral): Pt presents this date s/p (right)  TSA.   Past Medical History/Comorbidities:   Mr. Cesar Leon  has a past medical history of Elevated triglycerides with high cholesterol (4/16/2013), GERD (gastroesophageal reflux disease), History of colon polyps (4/16/2013), Hypertension, IBS (irritable bowel syndrome), Insomnia, Knee pain, bilateral, Low testosterone (7/5/2017), Shoulder pain, bilateral, Tendonitis, Testosterone deficiency, Type 2 diabetes mellitus ([facility] [address] Unspecified sleep apnea (2011), and Vertigo (2021). He has no past medical history of Unspecified adverse effect of anesthesia. Mr. Slava Levin  has a past surgical history that includes hx gi (2001, 2010); hx orthopaedic (1970's); hx other surgical; hx colonoscopy (10/20/2021); hx lap cholecystectomy (05/2021); and hx cataract removal (Bilateral). Social History/Living Environment:   Home Environment: Private residence  Living Alone: No  Support Systems: Spouse/Significant Other  Patient Expects to be Discharged to[de-identified] Home    Prior Level of Function/Work/Activity:  Independent with all activities of daily living to include driving. Number of Personal Factors/Comorbidities that affect the Plan of Care:    ASSESSMENT OF OCCUPATIONAL PERFORMANCE[de-identified]   Most Recent Physical Functioning:   Balance  Sitting: Intact  Standing: Intact       Gross Assessment  AROM: Within functional limits (B LEs)  Strength: Within functional limits (B LEs)  Coordination: Within functional limits            Coordination  Fine Motor Skills-Upper: Left Intact; Right Intact  Gross Motor Skills-Upper: Left Intact; Right Impaired         Mental Status  Neurologic State: Alert  Orientation Level: Oriented X4  Cognition: Appropriate decision making  Perception: Appears intact  Perseveration: No perseveration noted  Safety/Judgement: Awareness of environment    RUE PROM  R Shoulder Flexion: 60  R Shoulder External Rotation: 10  RUE Strength  R Elbow Flexion: 3-  R : 4           Basic ADLs (From Assessment) Complex ADLs (From Assessment)   Basic ADL  Feeding: Independent  Oral Facial Hygiene/Grooming: Independent  Bathing: Independent  Type of Bath:  (declined shower educated on CHG)  Upper Body Dressing: Supervision  Lower Body Dressing: Supervision  Toileting: Supervision     Grooming/Bathing/Dressing Activities of Daily Living     Cognitive Retraining  Safety/Judgement: Awareness of environment Functional Transfers  Bathroom Mobility: Independent  Toilet Transfer : Independent  Shower Transfer: Independent     Bed/Mat Mobility  Supine to Sit: Independent  Sit to Stand: Independent  Stand to Sit: Independent  Bed to Chair: Independent         Physical Skills Involved:  Range of Motion  Gross Motor Control  Pain (acute) Cognitive Skills Affected (resulting in the inability to perform in a timely and safe manner):  none Psychosocial Skills Affected:  Environmental Adaptation   Number of elements that affect the Plan of Care: 3-5:  MODERATE COMPLEXITY   CLINICAL DECISION MAKING:   [facility] [employer] AM-PAC 6 Clicks   Daily Activity Inpatient Short Form  How much help from another person does the patient currently need. .. Total A Lot A Little None   1. Putting on and taking off regular lower body clothing? [] 1   [] 2   [] 3   [x] 4   2. Bathing (including washing, rinsing, drying)? [] 1   [] 2   [] 3   [x] 4   3. Toileting, which includes using toilet, bedpan or urinal?   [] 1   [] 2   [] 3   [x] 4   4. Putting on and taking off regular upper body clothing? [] 1   [] 2   [] 3   [x] 4   5. Taking care of personal grooming such as brushing teeth? [] 1   [] 2   [] 3   [x] 4   6. Eating meals? [] 1   [] 2   [] 3   [x] 4   © 2007, Trustees of [facility] [employer], under license to [employer]. All rights reserved     Score:  Initial: 24 Most Recent: X (Date: -- )    Interpretation of Tool:  Represents activities that are increasingly more difficult (i.e. Bed mobility, Transfers, Gait).          Use of outcome tool(s) and clinical judgement create a POC that gives a: LOW COMPLEXITY            TREATMENT:   (In addition to Assessment/Re-Assessment sessions the following treatments were rendered)     Pre-treatment Symptoms/Complaints:  no complaint of pain  Pain: Initial:      Post Session:  0     Self Care: (25 min): Procedure(s) (per grid) utilized to improve and/or restore self-care/home management as related to dressing, toileting, grooming, and functional mobility . Required minimal verbal and   cueing to facilitate activities of daily living skills and compensatory activities. Assessment/  Evaluation complete. Treatment/Session Assessment:     Response to Treatment:  pt up in room tolerated well. Education:  [] Home Exercises  [x] Fall Precautions  [x] Shoulder Precautions [] Going Home Video  [] Knee/Hip Prosthesis Review [x] Adaptive Equipment as Needed       Interdisciplinary Collaboration:   Physical Therapist  Occupational Therapist  Registered Nurse    After treatment position/precautions:   Up in chair  Bed/Chair-wheels locked  Call light within reach  RN notified     Compliance with Program/Exercises: Compliant all of the time. Recommendations/Intent for next treatment session:  Pt doing well all goals met and will do well at home with support from wife. No further Occupational Therapy warranted, will discharge Occupational Therapy services.         Total Treatment Duration:  OT Patient Time In/Time Out  Time In: 0900  Time Out: NEY Muñoz

## 2022-03-05 NOTE — ANESTHESIA POSTPROCEDURE EVALUATION
Procedure(s):  SHOULDER ARTHROPLASTY/TOTAL/ RIGHT  BICEPS TENODESIS/ RIGH. general    Anesthesia Post Evaluation      Multimodal analgesia: multimodal analgesia used between 6 hours prior to anesthesia start to PACU discharge  Patient location during evaluation: PACU  Patient participation: complete - patient participated  Level of consciousness: awake  Pain management: adequate  Airway patency: patent  Anesthetic complications: no  Cardiovascular status: acceptable  Respiratory status: acceptable  Hydration status: acceptable  Post anesthesia nausea and vomiting:  none      INITIAL Post-op Vital signs:   Vitals Value Taken Time   /75 03/04/22 1620   Temp 36.7 °C (98 °F) 03/04/22 1552   Pulse 81 03/04/22 1624   Resp 12 03/04/22 1620   SpO2 94 % 03/04/22 1624   Vitals shown include unvalidated device data.

## 2022-03-05 NOTE — PROGRESS NOTES
Problem: Mobility Impaired (Adult and Pediatric)  Goal: *Acute Goals and Plan of Care (Insert Text)  Outcome: Progressing Towards Goal  Note: GOALS (1-4 days):  (1.)  Patient will ambulate with INDEPENDENT for 650 feet with the least restrictive device. (2.)  Patient will be independent with shoulder HEP to increase range of motion per MD orders. ________________________________________________________________________________________________       PHYSICAL THERAPY: Initial Assessment and AM 3/5/2022  OBSERVATION: Hospital Day: 2  Payor[place] BLUE CHOICE / [place] / Product Type: HMO /      NAME/AGE/GENDER: Leigh Toscano is a 57 y.o. male   PRIMARY DIAGNOSIS: Arthritis of right shoulder region [M19.011]  Localized osteoarthritis of right shoulder [M19.011]  History of right shoulder replacement [Z96.611] Arthritis of right shoulder region Arthritis of right shoulder region  Procedure(s) (LRB):  SHOULDER ARTHROPLASTY/TOTAL/ RIGHT (Right)  BICEPS TENODESIS/ RIGH (Right)  1 Day Post-Op  ICD-10: Treatment Diagnosis:   Pain in Right Shoulder (M25.511)  Stiffness of Right Shoulder, Not elsewhere classified (M25.611)   Precaution/Allergies:  Patient has no known allergies. TSA post op protocol with PROM in external rotation to 10 degrees     ASSESSMENT:     Mr. Carlos Yao presents s/p R TSA. Patient demonstrates decreased R UE strength and ROM and decreased independence with his HEP. He is R hand dominant and has decreased ROM/strength in the L UE as well. He would benefit from therapy to address above deficits prior to d/c home with his spouse. Patient tolerated session well. R UE discomfort while supine in bed in sling but felt much  better once on side of  bed and out of sling. Good demonstration/tolerance for all exercises. Copy of HEP provided and reviewed movement and weight bearing restrictions. He is independent with bed mobility, transfers and gait.   Patient safe to d/c home from PT standpoint but will continue therapy during admission to work on HEP/R UE function. This section established at most recent assessment   PROBLEM LIST (Impairments causing functional limitations):  Decreased [place] with Bed Mobility  Decreased [place] with Transfers  Decreased [place] with Ambulation   Decreased [place] with shoulder HEP   INTERVENTIONS PLANNED: (Benefits and precautions of physical therapy have been discussed with the patient.)  Bed Mobility Training  Transfer Training  Gait Training  Therapeutic Exercises per MD orders  Modalities for Pain     TREATMENT PLAN: Frequency/Duration: twice daily for duration of hospital stay  Rehabilitation Potential For Stated Goals: Good     RECOMMENDED REHABILITATION/EQUIPMENT: (at time of discharge pending progress): Continue Skilled Therapy. HISTORY:   History of Present Injury/Illness (Reason for Referral):  R TSA  Past Medical History/Comorbidities:   Mr. Derek Gutierrez  has a past medical history of Elevated triglycerides with high cholesterol (4/16/2013), GERD (gastroesophageal reflux disease), History of colon polyps (4/16/2013), Hypertension, IBS (irritable bowel syndrome), Insomnia, Knee pain, bilateral, Low testosterone (7/5/2017), Shoulder pain, bilateral, Tendonitis, Testosterone deficiency, Type 2 diabetes mellitus ([facility] [address] Unspecified sleep apnea (2011), and Vertigo (2021). He has no past medical history of Unspecified adverse effect of anesthesia. Mr. Derek Gutierrez  has a past surgical history that includes hx gi (2001, 2010); hx orthopaedic (1970's); hx other surgical; hx colonoscopy (10/20/2021); hx lap cholecystectomy (05/2021); and hx cataract removal (Bilateral).   Social History/Living Environment:   Home Environment: Private residence  Living Alone: No  Support Systems: Spouse/Significant Other  Patient Expects to be Discharged to[de-identified] Home  Prior Level of Function/Work/Activity:  Independent; R hand dominant   Number of Personal Factors/Comorbidities that affect the Plan of Care: 0: LOW COMPLEXITY   EXAMINATION:   Most Recent Physical Functioning:   Gross Assessment:  AROM: Within functional limits (B LEs)  Strength: Within functional limits (B LEs)  Coordination: Within functional limits   RUE PROM  R Shoulder Flexion: 60  R Shoulder External Rotation: 10        RUE Strength  R Elbow Flexion: 3-  R : 4  Posture:     Balance:  Sitting: Intact  Standing: Intact Bed Mobility:  Supine to Sit: Independent  Wheelchair Mobility:     Transfers:  Sit to Stand: Independent  Stand to Sit: Independent  Bed to Chair: Independent  Gait:     Distance (ft): 20 Feet (ft)  Ambulation - Level of Assistance: Independent      Body Structures Involved:  Joints  Muscles Body Functions Affected: Movement Related Activities and Participation Affected: Mobility   Number of elements that affect the Plan of Care: 4+: HIGH COMPLEXITY   CLINICAL PRESENTATION:   Presentation: Stable and uncomplicated: LOW COMPLEXITY   CLINICAL DECISION MAKING:   [employer] AM-PAC 6 Clicks   Basic Mobility Inpatient Short Form  How much difficulty does the patient currently have. .. Unable A Lot A Little None   1. Turning over in bed (including adjusting bedclothes, sheets and blankets)? [] 1   [] 2   [x] 3   [] 4   2. Sitting down on and standing up from a chair with arms ( e.g., wheelchair, bedside commode, etc.)   [] 1   [] 2   [x] 3   [] 4   3. Moving from lying on back to sitting on the side of the bed? [] 1   [] 2   [x] 3   [] 4   How much help from another person does the patient currently need. .. Total A Lot A Little None   4. Moving to and from a bed to a chair (including a wheelchair)? [] 1   [] 2   [] 3   [x] 4   5. Need to walk in hospital room? [] 1   [] 2   [] 3   [x] 4   6. Climbing 3-5 steps with a railing? [] 1   [] 2   [] 3   [x] 4   © 2007, Trustees of [facility] [employer], under license to [employer].  All rights reserved      Score: Initial: 21 Most Recent: X (Date: -- )    Interpretation of Tool:  Represents activities that are increasingly more difficult (i.e. Bed mobility, Transfers, Gait). Medical Necessity:     Patient is expected to demonstrate progress in   strength and range of motion   to   increase independence with HEP and ADLs  . Reason for Services/Other Comments:  Patient continues to require skilled intervention due to   Decreased R UE strength and ROM and decreased independence with HEP. .   Use of outcome tool(s) and clinical judgement create a POC that gives a: Clear prediction of patient's progress: LOW COMPLEXITY            TREATMENT:   (In addition to Assessment/Re-Assessment sessions the following treatments were rendered)   Pre-treatment Symptoms/Complaints:  patient agreeable. Pain: Initial:   Pain Intensity 1: 4  Post Session:  3     Therapeutic Activity: (    25 minutes): Therapeutic activities including Chair transfers, Toilet transfers, Ambulation on level ground, exercises as below, and d/c education to improve strength and dynamic movement of arm - right to improve functional lifting, carrying, and reaching. Required minimal verbal and visual cues   to promote coordination of right, upper extremity(s).      assessment   Date:  3/5/22 Date:   Date:     ACTIVITY/EXERCISE AM PM AM PM AM PM   Gripping 20        Wrist Flexion/Extension 20        Wrist Ulnar/Radial Deviation         Pronation/Supination 20        Elbow Flexion/Extension 20        Shoulder Flexion/Extension 10 P-at table        Shoulder AB/ADduction         Shoulder IR/ER 10 P        Pulleys         Pendulums 20 CW, 20 CCW        Shrugs 20        Isometric:                 Flexion         Extension         ABduction         ADduction         Biceps/Triceps                  B = bilateral; AA = active assistive; A = active; P = passive  Education:  [x]  Home Exercises  [x]  Sling Application   [x]  Movement Precautions   []  Pulleys   [x]  Use of Ice []  Other:   Treatment/Session Assessment:    Response to Treatment:  patient participated well and felt better after therapy. Interdisciplinary Collaboration:   Physical Therapist  Registered Nurse  After treatment position/precautions:   Up in chair  Bed/Chair-wheels locked  Call light within reach   Compliance with Program/Exercises: Will assess as treatment progresses. Recommendations/Intent for next treatment session:  Treatment next visit will focus on increasing Mr. Pitts independence with bed mobility, transfers, gait training, strength/ROM exercises, modalities for pain, and patient education.    Total Treatment Duration:  PT Patient Time In/Time Out  Time In: 0750  Time Out: 1200 Toshia Ordonez, PT [employer]

## 2022-03-07 ENCOUNTER — PATIENT OUTREACH (OUTPATIENT)
Dept: CASE MANAGEMENT | Age: 62
End: 2022-03-07

## 2022-03-07 NOTE — PROGRESS NOTES
Care Transitions Outreach Attempt    Call within 2 business days of discharge: Yes   Attempted to reach patient for transitions of care follow up. Unable to reach patient. Patient: Sarah Almaguer Patient : 1960 MRN: [id number]    Last Discharge [address]       Complaint Diagnosis Description Type Department Provider    3/4/22  Arthritis of right shoulder region . .. Admission (Discharged) Desiree Faye MD            Was this an external facility discharge?  No      Noted following upcoming appointments from discharge chart review:   [facility] follow up appointment(s):   Future Appointments   Date Time Provider Demond Casillas   3/16/2022 11:00 AM Petty Winters MD [facility]   2022  9:45 AM Misty Ferrell MD [facility] FPA

## 2022-03-08 ENCOUNTER — PATIENT OUTREACH (OUTPATIENT)
Dept: CASE MANAGEMENT | Age: 62
End: 2022-03-08

## 2022-03-08 NOTE — PROGRESS NOTES
Care Transitions Outreach Attempt    Call within 2 business days of discharge: Yes   Attempted to reach patient for transitions of care follow up. Unable to reach patient. Patient: Alex Resendiz Patient : 1960 MRN: [id number]    Last Discharge [address]       Complaint Diagnosis Description Type Department Provider    3/4/22  Arthritis of right shoulder region . .. Admission (Discharged) Lucila Beard MD            Was this an external facility discharge?  No      Noted following upcoming appointments from discharge chart review:   [facility] follow up appointment(s):   Future Appointments   Date Time Provider Demond Casillas   3/16/2022 11:00 AM Olivia Hernandez MD [facility]   2022  9:45 AM Anabel Alfaro MD [facility]PA FPA

## 2022-03-10 ENCOUNTER — PATIENT OUTREACH (OUTPATIENT)
Dept: CASE MANAGEMENT | Age: 62
End: 2022-03-10

## 2022-03-10 NOTE — PROGRESS NOTES
Care Transitions Initial Call    Call within 2 business days of discharge: Yes     Patient: Jemal Germain Patient : 1960 MRN: [id number]    Last Discharge [address]       Complaint Diagnosis Description Type Department Provider    3/4/22  Arthritis of right shoulder region . .. Admission (Discharged) Mitzi Faith MD          Was this an external facility discharge? No   Challenges to be reviewed by the provider   Additional needs identified to be addressed with provider: no  none         Method of communication with provider : none    Discussed COVID-19 related testing which was available at this time. Test results were negative. Patient informed of results, if available? yes     Advance Care Planning:   Does patient have an Advance Directive:  health care decision makers updated    Inpatient Readmission Risk score: No data recorded  Was this a readmission? no   Patients top risk factors for readmission: s/p right total shoulder replacement   Interventions to address risk factors: Obtained and reviewed discharge summary and/or continuity of care documents    Care Transition Nurse (CTN) contacted the family by telephone to perform post hospital discharge assessment. Verified name and  with family as identifiers. Provided introduction to self, and explanation of the CTN role. CTN reviewed discharge instructions, medical action plan and red flags with family who verbalized understanding. Were discharge instructions available to patient? yes. Reviewed appropriate site of care based on symptoms and resources available to patient including: When to call 911. Patient given an opportunity to ask questions and does not have any further questions or concerns at this time. The patient agrees to contact the PCP office for questions related to their healthcare. Medication reconciliation was performed with patient, who verbalizes understanding of administration of home medications.   Referral to Pharm D needed: no     Home Health/Outpatient orders at discharge: none    Covid Risk Education    Educated patient about risk for severe COVID-19 due to risk factors according to CDC guidelines. LPN CC reviewed discharge instructions, medical action plan and red flag symptoms with the patient who verbalized understanding. Discussed COVID vaccination status: yes. Education provided on COVID-19 vaccination as appropriate. Discussed exposure protocols and quarantine with CDC Guidelines. Patient was given an opportunity to verbalize any questions and concerns and agrees to contact LPN CC or health care provider for questions related to their healthcare. Was patient discharged with a pulse oximeter? no. Discussed and confirmed pulse oximeter discharge instructions and when to notify provider or seek emergency care. Discussed follow-up appointments. Is follow up appointment scheduled within 7 days of discharge? no.   [address] follow up appointment(s):   Future Appointments   Date Time Provider Demond Casillas   3/16/2022 11:00 AM Maurisio Wade MD [facility]   4/28/2022  9:45 AM Toñito Larkin MD [facility] FPA       Patient declines further outreach. CTN provided contact information for future needs. Goals Addressed                 This Visit's Progress     Attends follow-up appointments as directed.  Knowledge and adherence of prescribed medication (ie. action, side effects, missed dose, etc.).

## 2022-03-15 PROBLEM — Z09 SURGERY FOLLOW-UP EXAMINATION: Status: ACTIVE | Noted: 2022-03-15

## 2022-03-18 PROBLEM — M79.2 NEUROPATHIC PAIN: Status: ACTIVE | Noted: 2022-01-11

## 2022-03-18 PROBLEM — M19.011 ARTHRITIS OF RIGHT SHOULDER REGION: Status: ACTIVE | Noted: 2021-12-07

## 2022-03-18 PROBLEM — R20.9: Status: ACTIVE | Noted: 2021-03-11

## 2022-03-19 PROBLEM — M19.011 LOCALIZED OSTEOARTHRITIS OF RIGHT SHOULDER: Status: ACTIVE | Noted: 2022-03-04

## 2022-03-19 PROBLEM — Z96.611 HISTORY OF RIGHT SHOULDER REPLACEMENT: Status: ACTIVE | Noted: 2022-03-04

## 2022-03-19 PROBLEM — R68.89 ABNORMAL CLINICAL FINDING: Status: ACTIVE | Noted: 2022-01-11

## 2022-03-19 PROBLEM — R20.2 PARESTHESIA OF BOTH FEET: Status: ACTIVE | Noted: 2022-01-11

## 2022-03-19 PROBLEM — R79.89 LOW TESTOSTERONE: Status: ACTIVE | Noted: 2017-07-05

## 2022-03-19 PROBLEM — K21.9 GASTROESOPHAGEAL REFLUX DISEASE WITHOUT ESOPHAGITIS: Status: ACTIVE | Noted: 2021-02-09

## 2022-03-19 PROBLEM — R07.2 PRECORDIAL PAIN: Status: ACTIVE | Noted: 2021-02-09

## 2022-03-19 PROBLEM — Z79.899 ENCOUNTER FOR MEDICATION MANAGEMENT: Status: ACTIVE | Noted: 2022-01-11

## 2022-03-20 PROBLEM — R20.8 DYSESTHESIA: Status: ACTIVE | Noted: 2022-01-11

## 2022-03-20 PROBLEM — K80.20 CALCULUS OF GALLBLADDER WITHOUT CHOLECYSTITIS WITHOUT OBSTRUCTION: Status: ACTIVE | Noted: 2021-04-20

## 2022-03-20 PROBLEM — R73.09 ELEVATED HEMOGLOBIN A1C: Status: ACTIVE | Noted: 2019-10-11

## 2022-03-20 PROBLEM — E11.9 TYPE 2 DIABETES MELLITUS (HCC): Status: ACTIVE | Noted: 2021-11-18

## 2022-03-22 NOTE — PROGRESS NOTES
Care Management Interventions  PCP Verified by CM: Yes  Support Systems: Spouse/Significant Other  Discharge Location  Patient Expects to be Discharged to[de-identified] Home with family assistance  57 M with RTSA. Home with spouse. Pt to follow-up with Dr. Joseph Richmond for any needed continued therapy. yes

## 2022-03-24 PROBLEM — Z09 SURGERY FOLLOW-UP EXAMINATION: Status: ACTIVE | Noted: 2022-03-15

## 2022-04-14 PROBLEM — Z09 SURGERY FOLLOW-UP EXAMINATION: Status: RESOLVED | Noted: 2022-03-15 | Resolved: 2022-04-14

## 2022-06-01 ENCOUNTER — OFFICE VISIT (OUTPATIENT)
Dept: ORTHOPEDIC SURGERY | Age: 62
End: 2022-06-01

## 2022-06-01 DIAGNOSIS — M19.011 ARTHRITIS OF RIGHT SHOULDER REGION: ICD-10-CM

## 2022-06-01 DIAGNOSIS — Z09 SURGERY FOLLOW-UP EXAMINATION: Primary | ICD-10-CM

## 2022-06-01 DIAGNOSIS — Z96.611 STATUS POST TOTAL SHOULDER ARTHROPLASTY, RIGHT: ICD-10-CM

## 2022-06-01 PROBLEM — M47.816 FACET ARTHROPATHY, LUMBAR: Status: ACTIVE | Noted: 2020-02-12

## 2022-06-01 PROBLEM — M25.511 SHOULDER PAIN, BILATERAL: Status: ACTIVE | Noted: 2020-02-12

## 2022-06-01 PROBLEM — M25.561 KNEE PAIN, BILATERAL: Status: ACTIVE | Noted: 2020-02-12

## 2022-06-01 PROBLEM — M25.562 KNEE PAIN, BILATERAL: Status: ACTIVE | Noted: 2020-02-12

## 2022-06-01 PROBLEM — M25.512 SHOULDER PAIN, BILATERAL: Status: ACTIVE | Noted: 2020-02-12

## 2022-06-01 PROBLEM — M54.41 CHRONIC BILATERAL LOW BACK PAIN WITH RIGHT-SIDED SCIATICA: Status: ACTIVE | Noted: 2020-02-12

## 2022-06-01 PROBLEM — G89.29 CHRONIC BILATERAL LOW BACK PAIN WITH RIGHT-SIDED SCIATICA: Status: ACTIVE | Noted: 2020-02-12

## 2022-06-01 PROBLEM — M51.369 DDD (DEGENERATIVE DISC DISEASE), LUMBAR: Status: ACTIVE | Noted: 2020-02-12

## 2022-06-01 PROBLEM — M47.816 SPONDYLOSIS OF LUMBAR REGION WITHOUT MYELOPATHY OR RADICULOPATHY: Status: ACTIVE | Noted: 2020-02-12

## 2022-06-01 PROBLEM — M51.36 DDD (DEGENERATIVE DISC DISEASE), LUMBAR: Status: ACTIVE | Noted: 2020-02-12

## 2022-06-01 PROBLEM — E66.01 SEVERE OBESITY WITH BODY MASS INDEX (BMI) OF 35.0 TO 39.9 WITH SERIOUS COMORBIDITY (HCC): Status: ACTIVE | Noted: 2018-06-28

## 2022-06-01 PROCEDURE — 99024 POSTOP FOLLOW-UP VISIT: CPT | Performed by: ORTHOPAEDIC SURGERY

## 2022-06-01 RX ORDER — ALOGLIPTIN 25 MG/1
25 TABLET, FILM COATED ORAL DAILY
COMMUNITY
End: 2022-09-16

## 2022-06-01 RX ORDER — DICLOFENAC SODIUM 75 MG/1
75 TABLET, DELAYED RELEASE ORAL 2 TIMES DAILY
COMMUNITY
End: 2022-09-16

## 2022-06-01 NOTE — PROGRESS NOTES
Name: Nguyen Lovett  YOB: 1960  Gender: male  MRN: [id number]    CC:   Chief Complaint   Patient presents with    Shoulder Pain     recheck s/p right TSA DOS 3/4/22   , The patient follows up 12  weeks status post right TSA. No surgery found  No surgery found    HPI: The patient is doing well. They feel as if their pain has decreased. He states yesterday was only time where he reached out to grab something and felt some pain laterally. He has not been to therapy for the last 2 weeks after his brother passed away and was in the ICU for a week.       Not on File  Past Medical History:   Diagnosis Date    Elevated triglycerides with high cholesterol 4/16/2013    GERD (gastroesophageal reflux disease)     managed with medication     History of colon polyps 4/16/2013    8/10    Hypertension     medication    IBS (irritable bowel syndrome)     Insomnia     controlled with  Ambien    Knee pain, bilateral     Low testosterone 7/5/2017    Shoulder pain, bilateral     cortisone injections     Tendonitis     left shoulder    Testosterone deficiency     using testosterone gel    Type 2 diabetes mellitus ([facility] [address]     pt states pre-diabetic, FBS , A1c 5.7 (10/2021), denies hypoglycemia    Unspecified sleep apnea 2011    pt says this is questionable, pt is not using CPAP (noted 2/23/22)     Vertigo 2021    hx      Past Surgical History:   Procedure Laterality Date    CATARACT REMOVAL Bilateral     IOL     CATARACT REMOVAL Bilateral 02/2022    CHOLECYSTECTOMY, LAPAROSCOPIC  05/2021    COLONOSCOPY  10/20/2021    GI  2001, 2010    colonoscopy    ORTHOPEDIC SURGERY  1970's    tendon graft right hand    ORTHOPEDIC SURGERY Right 03/04/2022    R shoulder    OTHER SURGICAL HISTORY      fatty tumor back of neck     Family History   Problem Relation Age of Onset    Diabetes Mother     Hypertension Mother     Other Mother         severe post op N&V x1 after hip surgery    Heart Disease Mother     Hypertension Sister     Cancer Father         sinus cavity tumor    Heart Disease Father         congestive heart failure    Diabetes Brother      Social History     Socioeconomic History    Marital status:      Spouse name: Not on file    Number of children: Not on file    Years of education: Not on file    Highest education level: Not on file   Occupational History    Not on file   Tobacco Use    Smoking status: Never Smoker    Smokeless tobacco: Never Used   Substance and Sexual Activity    Alcohol use: No    Drug use: No     Types: Prescription, OTC    Sexual activity: Not on file   Other Topics Concern    Not on file   Social History Narrative    Not on file     Social Determinants of Health     Financial Resource Strain:     Difficulty of Paying Living Expenses: Not on file   Food Insecurity:     Worried About [address] [employer] in the Last Year: Not on file    [address] [employer] in the Last Year: Not on file   Transportation Needs:     Lack of Transportation (Medical): Not on file    Lack of Transportation (Non-Medical):  Not on file   Physical Activity:     Days of Exercise per Week: Not on file    Minutes of Exercise per Session: Not on file   Stress:     Feeling of Stress : Not on file   Social Connections:     Frequency of Communication with Friends and Family: Not on file    Frequency of Social Gatherings with Friends and Family: Not on file    Attends Catholic Services: Not on file    Active Member of Clubs or Organizations: Not on file    Attends Club or Organization Meetings: Not on file    Marital Status: Not on file   Intimate Partner Violence:     Fear of Current or Ex-Partner: Not on file    Emotionally Abused: Not on file    Physically Abused: Not on file    Sexually Abused: Not on file   Housing Stability:     Unable to Pay for Housing in the Last Year: Not on file    Number of [place] in the Last Year: Not on file    Unstable Housing in the Last Year: Not on file        No flowsheet data found. Review of Systems  Noncontributory     PE right shoulder:  General: Alert and Oriented x3 and appears to be of stated age. Head and Face: Normocephalic, atraumatic. Neck: Supple, normal range of motion. Lungs: Normal Respiratory rate. No dyspnea. Skin: No rash or erythema. Skin is cool to the touch. Surgical incisions appear to be healing well and there is no sign of infection. Psychiatric: Normal mood and affect. Answers questions appropriately. Musculoskeletal: The patient's Range of Motion in their operative shoulder today was measured at: Forward Elevation of 145. Abduction of 105. External Rotation of 25.  Appropriate strength  The swelling is minimal. They are neurovascularly intact. A/P:     ICD-10-CM    1. Surgery follow-up examination  Z09    2. Arthritis of right shoulder region  M19.011    3. Status post total shoulder arthroplasty, right  Z96.611      The patient is doing well status post the above mentioned procedure. They need to continue with Physical Therapy. Since he missed 2 weeks because of his brothers untimely death I suggest going back for a little bit more. If they have any questions or concerns the patient knows that they can call our office at any time. No follow-up provider specified.      Nba Fields MD  06/01/22

## 2022-07-17 ENCOUNTER — PATIENT MESSAGE (OUTPATIENT)
Dept: FAMILY MEDICINE CLINIC | Facility: CLINIC | Age: 62
End: 2022-07-17

## 2022-07-17 DIAGNOSIS — F51.04 CHRONIC INSOMNIA: Primary | ICD-10-CM

## 2022-07-18 RX ORDER — ZOLPIDEM TARTRATE 12.5 MG/1
12.5 TABLET, FILM COATED, EXTENDED RELEASE ORAL NIGHTLY PRN
Qty: 90 TABLET | Refills: 1 | Status: SHIPPED | OUTPATIENT
Start: 2022-07-18 | End: 2023-01-14

## 2022-07-18 NOTE — TELEPHONE ENCOUNTER
Sent in prescription for:     Requested Prescriptions     Signed Prescriptions Disp Refills    zolpidem (AMBIEN CR) 12.5 MG extended release tablet 90 tablet 1     Sig: Take 1 tablet by mouth nightly as needed for Sleep for up to 180 days.      Authorizing Provider: Adamaris Hurt

## 2022-07-18 NOTE — TELEPHONE ENCOUNTER
From: Darya Morales  To: Dr. Quick Gallia: 7/17/2022 6:12 PM EDT  Subject: Refill Request    I need new refill for my Zolpidem 12.5mg. Please send this to [facility] in [place].      Robi Christianson

## 2022-08-03 ENCOUNTER — OFFICE VISIT (OUTPATIENT)
Dept: ORTHOPEDIC SURGERY | Age: 62
End: 2022-08-03
Payer: COMMERCIAL

## 2022-08-03 DIAGNOSIS — M19.012 ARTHRITIS OF LEFT SHOULDER REGION: ICD-10-CM

## 2022-08-03 DIAGNOSIS — Z96.611 STATUS POST TOTAL SHOULDER ARTHROPLASTY, RIGHT: ICD-10-CM

## 2022-08-03 DIAGNOSIS — M19.011 ARTHRITIS OF RIGHT SHOULDER REGION: ICD-10-CM

## 2022-08-03 DIAGNOSIS — Z09 SURGERY FOLLOW-UP EXAMINATION: Primary | ICD-10-CM

## 2022-08-03 PROCEDURE — 99214 OFFICE O/P EST MOD 30 MIN: CPT | Performed by: ORTHOPAEDIC SURGERY

## 2022-08-03 NOTE — PROGRESS NOTES
Name: Bree Montes  YOB: 1960  Gender: male  MRN: [id number]    CC:   Chief Complaint   Patient presents with    Follow-up     S/P right TSA DOS 3-4-22   , The patient follows up 5 months status post right TSA. Shoulder Arthroplasty/total/ Right - Right and Biceps Tenodesis/ Righ - Right   3/4/2022  Left shoulder pain    HPI: The patient is doing well. They feel as if their pain has decreased. They have no complaints today, only questions on their progression. They feel as if they are progressing as they should. They are working on a home exercise program.   They feel as if they have improved from the surgery and pre-surgery state. He states that his left shoulder is was killing him now. Tramadol does not seem to help. He is willing to discuss proceeding with this in the November timeframe.     Not on File  Past Medical History:   Diagnosis Date    Elevated triglycerides with high cholesterol 4/16/2013    GERD (gastroesophageal reflux disease)     managed with medication     History of colon polyps 4/16/2013    8/10    Hypertension     medication    IBS (irritable bowel syndrome)     Insomnia     controlled with  Ambien    Knee pain, bilateral     Low testosterone 7/5/2017    Shoulder pain, bilateral     cortisone injections     Tendonitis     left shoulder    Testosterone deficiency     using testosterone gel    Type 2 diabetes mellitus ([facility] [address]     pt states pre-diabetic, FBS , A1c 5.7 (10/2021), denies hypoglycemia    Unspecified sleep apnea 2011    pt says this is questionable, pt is not using CPAP (noted 2/23/22)     Vertigo 2021    hx      Past Surgical History:   Procedure Laterality Date    CATARACT REMOVAL Bilateral     IOL     CATARACT REMOVAL Bilateral 02/2022    CHOLECYSTECTOMY, LAPAROSCOPIC  05/2021    COLONOSCOPY  10/20/2021    GI  2001, 2010    colonoscopy    ORTHOPEDIC SURGERY  1970's    tendon graft right hand    ORTHOPEDIC SURGERY Right 03/04/2022    GELY shoulder    OTHER SURGICAL HISTORY      fatty tumor back of neck     Family History   Problem Relation Age of Onset    Diabetes Mother     Hypertension Mother     Other Mother         severe post op N&V x1 after hip surgery    Heart Disease Mother     Hypertension Sister     Cancer Father         sinus cavity tumor    Heart Disease Father         congestive heart failure    Diabetes Brother      Social History     Socioeconomic History    Marital status:      Spouse name: Not on file    Number of children: Not on file    Years of education: Not on file    Highest education level: Not on file   Occupational History    Not on file   Tobacco Use    Smoking status: Never    Smokeless tobacco: Never   Substance and Sexual Activity    Alcohol use: No    Drug use: No     Types: Prescription, OTC    Sexual activity: Not on file   Other Topics Concern    Not on file   Social History Narrative    Not on file     Social Determinants of Health     Financial Resource Strain: Not on file   Food Insecurity: Not on file   Transportation Needs: Not on file   Physical Activity: Not on file   Stress: Not on file   Social Connections: Not on file   Intimate Partner Violence: Not on file   Housing Stability: Not on file        No flowsheet data found. ,    Review of Systems  Noncontributory     PE right shoulder:  General: Alert and Oriented x3 and appears to be of stated age. Head and Face: Normocephalic, atraumatic. Neck: Supple, normal range of motion. Lungs: Normal Respiratory rate. No dyspnea. Skin: No rash or erythema. Skin is cool to the touch. Surgical incisions appear to be healing well and there is no sign of infection. Psychiatric: Normal mood and affect. Answers questions appropriately. Musculoskeletal: The patient's Range of Motion today was measured at: Forward Elevation of 160. Abduction of 120. External Rotation of 20. The patient's strength today is:  External Rotation: 5.   Abduction: 5.  Belly Press Test: 5  The swelling is minimal. They are neurovascularly intact. Left shoulder range of motion is significantly restricted. Actively he can still raise it but it causes significant discomfort and pain. X-rayRozanna Client and axillary lateral views of the operative right shoulder were obtained and reviewed today in the office. There has been no change in the hardware's alignment and the glenohumeral position is appropriate on all the films. Stemless implant  Previous left shoulder x-ray reviewed and shows severe glenohumeral arthritis with inferior osteophytes. No significant proximal humeral migration at this time. A/P:     ICD-10-CM    1. Surgery follow-up examination  Z09 XR SHOULDER RIGHT (MIN 2 VIEWS)      2. Arthritis of right shoulder region  M19.011 XR SHOULDER RIGHT (MIN 2 VIEWS)      3. Status post total shoulder arthroplasty, right  Z96.611 XR SHOULDER RIGHT (MIN 2 VIEWS)      4. Arthritis of left shoulder region  M19.012         The patient is doing well status post the above mentioned procedure. They need to continue with Physical Therapy if they have not transitioned to a HEP. If they have any questions or concerns the patient knows that they can call our office at any time. We will proceed with a CT for the left shoulder which she is planning to replace in the next couple of months this fall. Return for After CT.      Mile Mejia MD  08/03/22

## 2022-08-08 ENCOUNTER — PATIENT MESSAGE (OUTPATIENT)
Dept: FAMILY MEDICINE CLINIC | Facility: CLINIC | Age: 62
End: 2022-08-08

## 2022-08-09 RX ORDER — ERGOCALCIFEROL 1.25 MG/1
50000 CAPSULE ORAL WEEKLY
Qty: 12 CAPSULE | Refills: 1 | Status: SHIPPED | OUTPATIENT
Start: 2022-08-09 | End: 2022-09-16

## 2022-08-09 NOTE — TELEPHONE ENCOUNTER
From: Mark Parsons  To: Dr. Mehta Patella: 8/8/2022 6:41 PM EDT  Subject: Need refill not on list    I need refill on my Vitamin D2 please send [phone].     Nasrin Maier

## 2022-08-09 NOTE — TELEPHONE ENCOUNTER
Sent in prescription for:     Requested Prescriptions     Signed Prescriptions Disp Refills    vitamin D (ERGOCALCIFEROL) 1.25 MG (09849 UT) CAPS capsule 12 capsule 1     Sig: Take 1 capsule by mouth once a week     Authorizing Provider: Pascale Nguyen

## 2022-08-10 DIAGNOSIS — M19.012 ARTHRITIS OF LEFT SHOULDER REGION: ICD-10-CM

## 2022-08-10 RX ORDER — ERGOCALCIFEROL 1.25 MG/1
CAPSULE ORAL
Qty: 4 CAPSULE | Refills: 0 | Status: SHIPPED | OUTPATIENT
Start: 2022-08-10

## 2022-08-16 ENCOUNTER — OFFICE VISIT (OUTPATIENT)
Dept: ORTHOPEDIC SURGERY | Age: 62
End: 2022-08-16
Payer: COMMERCIAL

## 2022-08-16 DIAGNOSIS — M19.011 ARTHRITIS OF RIGHT SHOULDER REGION: ICD-10-CM

## 2022-08-16 DIAGNOSIS — Z09 SURGERY FOLLOW-UP EXAMINATION: ICD-10-CM

## 2022-08-16 DIAGNOSIS — M19.012 ARTHRITIS OF LEFT SHOULDER REGION: Primary | ICD-10-CM

## 2022-08-16 DIAGNOSIS — Z96.611 STATUS POST TOTAL SHOULDER ARTHROPLASTY, RIGHT: ICD-10-CM

## 2022-08-16 PROCEDURE — 99214 OFFICE O/P EST MOD 30 MIN: CPT | Performed by: ORTHOPAEDIC SURGERY

## 2022-08-16 NOTE — PROGRESS NOTES
Name: Natacha Yanez  YOB: 1960  Gender: male  MRN: [id number]    CC:   Chief Complaint   Patient presents with    Follow-up     CT results right shoulder        HPI: Patient presents for follow-up of left shoulder CT scan. She is interested in discussing left shoulder arthroplasty. Patient is also status post right total shoulder arthroplasty date of surgery 3-4-2022 had radiographs at their last visit on 8-3-2022. No new changes. He is ready proceed.      Not on File  Past Medical History:   Diagnosis Date    Elevated triglycerides with high cholesterol 4/16/2013    GERD (gastroesophageal reflux disease)     managed with medication     History of colon polyps 4/16/2013    8/10    Hypertension     medication    IBS (irritable bowel syndrome)     Insomnia     controlled with  Ambien    Knee pain, bilateral     Low testosterone 7/5/2017    Shoulder pain, bilateral     cortisone injections     Tendonitis     left shoulder    Testosterone deficiency     using testosterone gel    Type 2 diabetes mellitus ([facility] [address]     pt states pre-diabetic, FBS , A1c 5.7 (10/2021), denies hypoglycemia    Unspecified sleep apnea 2011    pt says this is questionable, pt is not using CPAP (noted 2/23/22)     Vertigo 2021    hx      Past Surgical History:   Procedure Laterality Date    CATARACT REMOVAL Bilateral     IOL     CATARACT REMOVAL Bilateral 02/2022    CHOLECYSTECTOMY, LAPAROSCOPIC  05/2021    COLONOSCOPY  10/20/2021    GI  2001, 2010    colonoscopy    ORTHOPEDIC SURGERY  1970's    tendon graft right hand    ORTHOPEDIC SURGERY Right 03/04/2022    R shoulder    OTHER SURGICAL HISTORY      fatty tumor back of neck     Family History   Problem Relation Age of Onset    Diabetes Mother     Hypertension Mother     Other Mother         severe post op N&V x1 after hip surgery    Heart Disease Mother     Hypertension Sister     Cancer Father         sinus cavity tumor    Heart Disease Father         congestive heart failure    Diabetes Brother      Social History     Socioeconomic History    Marital status:      Spouse name: Not on file    Number of children: Not on file    Years of education: Not on file    Highest education level: Not on file   Occupational History    Not on file   Tobacco Use    Smoking status: Never    Smokeless tobacco: Never   Substance and Sexual Activity    Alcohol use: No    Drug use: No     Types: Prescription, OTC    Sexual activity: Not on file   Other Topics Concern    Not on file   Social History Narrative    Not on file     Social Determinants of Health     Financial Resource Strain: Not on file   Food Insecurity: Not on file   Transportation Needs: Not on file   Physical Activity: Not on file   Stress: Not on file   Social Connections: Not on file   Intimate Partner Violence: Not on file   Housing Stability: Not on file        No flowsheet data found. Review of Systems  Non-contributory    PE left shoulder:    Left shoulder is significantly limited and restricted in regards to motion. Strength still seems appropriate just painful in the scapular plane. CT left shoulder from 8/8/22  FINDINGS: Large marginal osteophyte along the interarticular margin of the  humerus. Subchondral sclerosis with mild remodeling deformity and small  cysts/erosions noted more prominently along the superior half of the  articular margin of the humeral head. Uplifting of the humerus. Minimal bony hypertrophy of the glenoid. Several small subchondral cysts or  erosions noted in the glenoid. Largest cyst has a diameter of about 4 mm  within the anterior superior quadrant. Subchondral sclerosis noted. Moderate asymmetric joint narrowing of the inferior glenohumeral articulation. Small joint effusion. Loose body in the superior subscapular recess  estimated measure about 1 cm. 80 degrees retroversion. Glenoid stock estimated 22 mm. Walch type A1 configuration.   Evidence of previous subacromial decompression. A few ossicles are noted  along the [facility] joint. The remainder of the bony shoulder girdle and bony thorax demonstrates no  acute suspicious findings. Imaged lung demonstrates evidence suggesting mild congestion or possibly  small airways disease. Cardiomegaly. CONCLUSION: Glenohumeral osteoarthrosis with anatomic findings as described. A/Plan:     ICD-10-CM    1. Arthritis of left shoulder region  M19.012       2. Surgery follow-up examination  Z09       3. Arthritis of right shoulder region  M19.011       4. Status post total shoulder arthroplasty, right  Z96.611            The patient desires a left total shoulder replacement. He does have a lot of retroversion. I think he can be augmented and supported. There is no evidence of significant subluxation posteriorly. Discussed the possibility of reverse total shoulder replacement. I talked with them extensively about the risks, benefits, reasonable expectations and expected recovery time as well as possible complications including but not limited to bleeding, infection, wear of the components requiring revision, neurovascular injury, instability/dislocations, cosmetic deformity, stiffness, pain, continued problems, DVT, PE, hardware failure,  fracture, heterotopic ossification, MI and other anesthesia related risks, etc.  They seem to understand and wish to proceed. I gave them education literature and answered their questions. Return for Surgery.             Maria Del Carmen Law MD  08/16/22

## 2022-08-17 DIAGNOSIS — M19.012 ARTHRITIS OF LEFT SHOULDER REGION: Primary | ICD-10-CM

## 2022-08-18 ENCOUNTER — PATIENT MESSAGE (OUTPATIENT)
Dept: FAMILY MEDICINE CLINIC | Facility: CLINIC | Age: 62
End: 2022-08-18

## 2022-08-18 RX ORDER — SITAGLIPTIN 100 MG/1
TABLET, FILM COATED ORAL
Qty: 90 TABLET | Refills: 1 | Status: SHIPPED | OUTPATIENT
Start: 2022-08-18

## 2022-08-18 NOTE — TELEPHONE ENCOUNTER
From: Filiberto Cortés  To: Dr. Troy Dust: 8/18/2022 7:16 AM EDT  Subject: Refill    I need new refill for my Januvia 100mg send to [facility] please.      Darlitad Christiano

## 2022-09-16 ENCOUNTER — HOSPITAL ENCOUNTER (OUTPATIENT)
Dept: PREADMISSION TESTING | Age: 62
Discharge: HOME | End: 2022-09-19
Payer: COMMERCIAL

## 2022-09-16 VITALS
SYSTOLIC BLOOD PRESSURE: 120 MMHG | HEIGHT: 72 IN | BODY MASS INDEX: 31.69 KG/M2 | DIASTOLIC BLOOD PRESSURE: 68 MMHG | RESPIRATION RATE: 18 BRPM | WEIGHT: 234 LBS | HEART RATE: 68 BPM | TEMPERATURE: 97.3 F | OXYGEN SATURATION: 99 %

## 2022-09-16 LAB
ANION GAP SERPL CALC-SCNC: 2 MMOL/L (ref 4–13)
BACTERIA SPEC CULT: NORMAL
BUN SERPL-MCNC: 18 MG/DL (ref 8–23)
CALCIUM SERPL-MCNC: 9.3 MG/DL (ref 8.3–10.4)
CHLORIDE SERPL-SCNC: 106 MMOL/L (ref 101–110)
CO2 SERPL-SCNC: 29 MMOL/L (ref 21–32)
CREAT SERPL-MCNC: 0.76 MG/DL (ref 0.8–1.5)
ERYTHROCYTE [DISTWIDTH] IN BLOOD BY AUTOMATED COUNT: 13.5 % (ref 11.9–14.6)
GLUCOSE BLD STRIP.AUTO-MCNC: 98 MG/DL (ref 65–100)
GLUCOSE SERPL-MCNC: 92 MG/DL (ref 65–100)
HCT VFR BLD AUTO: 48.5 % (ref 41.1–50.3)
HGB BLD-MCNC: 16 G/DL (ref 13.6–17.2)
MCH RBC QN AUTO: 29.3 PG (ref 26.1–32.9)
MCHC RBC AUTO-ENTMCNC: 33 G/DL (ref 31.4–35)
MCV RBC AUTO: 88.7 FL (ref 79.6–97.8)
NRBC # BLD: 0 K/UL (ref 0–0.2)
PLATELET # BLD AUTO: 249 K/UL (ref 150–450)
PMV BLD AUTO: 9.5 FL (ref 9.4–12.3)
POTASSIUM SERPL-SCNC: 3.8 MMOL/L (ref 3.5–5.1)
RBC # BLD AUTO: 5.47 M/UL (ref 4.23–5.6)
SERVICE CMNT-IMP: NORMAL
SERVICE CMNT-IMP: NORMAL
SODIUM SERPL-SCNC: 137 MMOL/L (ref 136–145)
WBC # BLD AUTO: 9.6 K/UL (ref 4.3–11.1)

## 2022-09-16 PROCEDURE — 82962 GLUCOSE BLOOD TEST: CPT

## 2022-09-16 PROCEDURE — 85027 COMPLETE CBC AUTOMATED: CPT

## 2022-09-16 PROCEDURE — 80048 BASIC METABOLIC PNL TOTAL CA: CPT

## 2022-09-16 PROCEDURE — 87641 MR-STAPH DNA AMP PROBE: CPT

## 2022-09-16 ASSESSMENT — PAIN SCALES - GENERAL: PAINLEVEL_OUTOF10: 4

## 2022-09-16 ASSESSMENT — PAIN DESCRIPTION - PAIN TYPE: TYPE: CHRONIC PAIN

## 2022-09-16 ASSESSMENT — PAIN DESCRIPTION - ORIENTATION: ORIENTATION: LEFT

## 2022-09-16 ASSESSMENT — PAIN DESCRIPTION - LOCATION: LOCATION: SHOULDER

## 2022-09-16 ASSESSMENT — PAIN DESCRIPTION - DESCRIPTORS: DESCRIPTORS: SHARP;SORE

## 2022-09-16 ASSESSMENT — PAIN DESCRIPTION - FREQUENCY: FREQUENCY: CONTINUOUS

## 2022-09-16 NOTE — PERIOP NOTE
Patient verified name and     Order for consent not found in EHR and matches case posting; patient verified. Type III surgery, walk-in assessment complete. Labs per surgeon: none  Labs per anesthesia protocol: SQBS = 98, MRSA, BMP, CBC, POC Glucose DOS, Type & Screen DOS  EKG: not required today     Patient has had no changes in cardiac history. EKG's (22 and 21) and Stress test 3/3/21 results in EHR and previously reviewed by Dr. David Hancock . No orders received and ok to proceed. MRSA/MSSA swab collected; pharmacy to review and dose antibiotic as appropriate. Hospital approved surgical skin cleanser and instructions given per hospital policy. Patient provided with and instructed on educational handouts including Guide to Surgery, Pain Management, Hand Hygiene, Blood Transfusion Education, and [place] [facility] Brochure. Patient answered medical/surgical history questions at their best of ability. All prior to admission medications documented in [facility]. Original medication prescription bottle were visualized during patient appointment. Patient instructed to hold all vitamins 7 days prior to surgery and NSAIDS 5 days prior to surgery, patient verbalized understanding. Patient teach back successful and patient demonstrates knowledge of instructions.

## 2022-09-16 NOTE — PERIOP NOTE
Latest Reference Range & Units 9/16/22 12:09   Sodium 136 - 145 mmol/L 137   Potassium 3.5 - 5.1 mmol/L 3.8   Chloride 101 - 110 mmol/L 106   CO2 21 - 32 mmol/L 29   BUN,BUNPL 8 - 23 MG/DL 18   Creatinine 0.8 - 1.5 MG/DL 0.76 (L)   Anion Gap 4 - 13 mmol/L 2 (L)   GFR Non-African American >60 ml/min/1.73m2 >60   GFR  >60 ml/min/1.73m2 >60   Glucose, Random 65 - 100 mg/dL 92   CALCIUM, SERUM, 602806 8.3 - 10.4 MG/DL 9.3   (L): Data is abnormally low     Latest Reference Range & Units 9/16/22 12:09   WBC 4.3 - 11.1 K/uL 9.6   RBC 4.23 - 5.6 M/uL 5.47   Hemoglobin Quant 13.6 - 17.2 g/dL 16.0   Hematocrit 41.1 - 50.3 % 48.5   MCV 79.6 - 97.8 FL 88.7   MCH 26.1 - 32.9 PG 29.3   MCHC 31.4 - 35.0 g/dL 33.0   MPV 9.4 - 12.3 FL 9.5   RDW 11.9 - 14.6 % 13.5   Platelet Count 801 - 450 K/uL 249      9/16/22 12:10   MSSA/MRSA SCREEN BY PCR Rpt   Rpt: View report in Results Review for more information  SA Target not detected    Labs reviewed and routed to Dr. Jose Tavarez

## 2022-09-16 NOTE — PERIOP NOTE
PLEASE CONTINUE TAKING ALL PRESCRIPTION MEDICATIONS UP TO THE DAY OF SURGERY UNLESS OTHERWISE DIRECTED BELOW. DISCONTINUE all vitamins and supplements 7 days prior to surgery. DISCONTINUE Non-Steriodal Anti-Inflammatory (NSAIDS) such as Advil and Aleve 5 days prior to surgery. Home Medications to take  the day of surgery   gabapentin (Neurontin)   Pantoprazole (Protonix)            Home Medications   to Hold   Linaclotide (Linzess) hold morning of surgery   Lisinopril-HCTZ (Prinzide, Zestoretic) hold morning of surgery  Empagliflozin (Jardiance) hold morning of surgery       Comments       On the day before surgery please take Acetaminophen 1000mg in the morning and then again before bed. You may substitute for Tylenol 650 mg. How to Use Your Incentive Spirometer       About Your Incentive Spirometer  An incentive spirometer is a device that will expand your lungs by helping you to breathe more deeply and fully. The parts of your incentive spirometer are labeled in Figure 1. Using your incentive spirometer  When you're using your incentive spirometer, make sure to breathe through your mouth. If you breathe through your nose, the incentive spirometer won't work properly. You can hold your nose if you have trouble. DO NOT BLOW INTO THE DEVICE. If you feel dizzy at any time, stop and rest. Try again at a later time. Sit upright in a chair or in bed. Hold the incentive spirometer at eye level. Put the mouthpiece in your mouth and close your lips tightly around it. Slowly breathe out (exhale) completely. Breathe in (inhale) slowly through your mouth as deeply as you can. As you take the breath, you will see the piston rise inside the large column. While the piston rises, the indicator on the right should move upwards. It should stay in between the 2 arrows (see Figure 1). Try to get the piston as high as you can, while keeping the indicator between the arrows.  If the indicator doesn't stay between the arrows, you're breathing either too fast or too slow. When you get it as high as you can, hold your breath for 10 seconds, or as long as possible. While you're holding your breath, the piston will slowly fall to the base of the spirometer. Once the piston reaches the bottom of the spirometer, breathe out slowly through your mouth. Rest for a few seconds. Repeat 10 times. Try to get the piston to the same level with each breath. After each set of 10 breaths, try to cough as coughing will help loosen or clear any mucus in your lungs. Put the marker at the level the piston reached on your incentive spirometer. This will be your goal next time. Repeat these steps every hour that you're awake. Cover the mouthpiece of the incentive spirometer when you aren't using it     Please do not bring home medications with you on the day of surgery unless otherwise directed by your nurse. If you are instructed to bring home medications, please give them to your nurse as they will be administered by the nursing staff. If you have any questions, please call [facility] Kimi Barton [phone] or [facility] [phone]. A copy of this note was provided to the patient for reference.

## 2022-09-19 RX ORDER — LISINOPRIL AND HYDROCHLOROTHIAZIDE 12.5; 1 MG/1; MG/1
TABLET ORAL
Qty: 30 TABLET | Refills: 0 | Status: SHIPPED | OUTPATIENT
Start: 2022-09-19

## 2022-09-22 ENCOUNTER — ANESTHESIA EVENT (OUTPATIENT)
Dept: SURGERY | Age: 62
End: 2022-09-22

## 2022-09-23 ENCOUNTER — HOSPITAL ENCOUNTER (OUTPATIENT)
Age: 62
Discharge: HOME | End: 2022-09-24
Attending: ORTHOPAEDIC SURGERY | Admitting: ORTHOPAEDIC SURGERY
Payer: COMMERCIAL

## 2022-09-23 ENCOUNTER — ANESTHESIA (OUTPATIENT)
Dept: SURGERY | Age: 62
End: 2022-09-23

## 2022-09-23 DIAGNOSIS — M19.012 ARTHRITIS OF LEFT SHOULDER REGION: Primary | ICD-10-CM

## 2022-09-23 LAB
ABO + RH BLD: NORMAL
BLOOD GROUP ANTIBODIES SERPL: NORMAL
GLUCOSE BLD STRIP.AUTO-MCNC: 103 MG/DL (ref 65–100)
SERVICE CMNT-IMP: ABNORMAL
SPECIMEN EXP DATE BLD: NORMAL

## 2022-09-23 PROCEDURE — 6360000002 HC RX W HCPCS: Performed by: ANESTHESIOLOGY

## 2022-09-23 PROCEDURE — 2500000003 HC RX 250 WO HCPCS: Performed by: ORTHOPAEDIC SURGERY

## 2022-09-23 PROCEDURE — 3600000015 HC SURGERY LEVEL 5 ADDTL 15MIN: Performed by: ORTHOPAEDIC SURGERY

## 2022-09-23 PROCEDURE — 3600000005 HC SURGERY LEVEL 5 BASE: Performed by: ORTHOPAEDIC SURGERY

## 2022-09-23 PROCEDURE — 2500000003 HC RX 250 WO HCPCS: Performed by: NURSE ANESTHETIST, CERTIFIED REGISTERED

## 2022-09-23 PROCEDURE — 82962 GLUCOSE BLOOD TEST: CPT

## 2022-09-23 PROCEDURE — C1713 ANCHOR/SCREW BN/BN,TIS/BN: HCPCS | Performed by: ORTHOPAEDIC SURGERY

## 2022-09-23 PROCEDURE — 2500000003 HC RX 250 WO HCPCS: Performed by: PHYSICIAN ASSISTANT

## 2022-09-23 PROCEDURE — 3700000000 HC ANESTHESIA ATTENDED CARE: Performed by: ORTHOPAEDIC SURGERY

## 2022-09-23 PROCEDURE — 86901 BLOOD TYPING SEROLOGIC RH(D): CPT

## 2022-09-23 PROCEDURE — C1776 JOINT DEVICE (IMPLANTABLE): HCPCS | Performed by: ORTHOPAEDIC SURGERY

## 2022-09-23 PROCEDURE — 2500000003 HC RX 250 WO HCPCS: Performed by: ANESTHESIOLOGY

## 2022-09-23 PROCEDURE — 7100000001 HC PACU RECOVERY - ADDTL 15 MIN: Performed by: ORTHOPAEDIC SURGERY

## 2022-09-23 PROCEDURE — 2709999900 HC NON-CHARGEABLE SUPPLY: Performed by: ORTHOPAEDIC SURGERY

## 2022-09-23 PROCEDURE — 23472 RECONSTRUCT SHOULDER JOINT: CPT | Performed by: ORTHOPAEDIC SURGERY

## 2022-09-23 PROCEDURE — 2580000003 HC RX 258: Performed by: PHYSICIAN ASSISTANT

## 2022-09-23 PROCEDURE — 6360000002 HC RX W HCPCS: Performed by: PHYSICIAN ASSISTANT

## 2022-09-23 PROCEDURE — 6360000002 HC RX W HCPCS: Performed by: ORTHOPAEDIC SURGERY

## 2022-09-23 PROCEDURE — 94761 N-INVAS EAR/PLS OXIMETRY MLT: CPT

## 2022-09-23 PROCEDURE — 6370000000 HC RX 637 (ALT 250 FOR IP): Performed by: ORTHOPAEDIC SURGERY

## 2022-09-23 PROCEDURE — 6360000002 HC RX W HCPCS: Performed by: NURSE ANESTHETIST, CERTIFIED REGISTERED

## 2022-09-23 PROCEDURE — 3700000001 HC ADD 15 MINUTES (ANESTHESIA): Performed by: ORTHOPAEDIC SURGERY

## 2022-09-23 PROCEDURE — 6370000000 HC RX 637 (ALT 250 FOR IP): Performed by: PHYSICIAN ASSISTANT

## 2022-09-23 PROCEDURE — 2580000003 HC RX 258: Performed by: ANESTHESIOLOGY

## 2022-09-23 PROCEDURE — 6370000000 HC RX 637 (ALT 250 FOR IP): Performed by: ANESTHESIOLOGY

## 2022-09-23 PROCEDURE — 7100000000 HC PACU RECOVERY - FIRST 15 MIN: Performed by: ORTHOPAEDIC SURGERY

## 2022-09-23 PROCEDURE — 23430 REPAIR BICEPS TENDON: CPT | Performed by: ORTHOPAEDIC SURGERY

## 2022-09-23 PROCEDURE — 94760 N-INVAS EAR/PLS OXIMETRY 1: CPT

## 2022-09-23 PROCEDURE — 2580000003 HC RX 258: Performed by: NURSE ANESTHETIST, CERTIFIED REGISTERED

## 2022-09-23 PROCEDURE — 64415 NJX AA&/STRD BRCH PLXS IMG: CPT | Performed by: ANESTHESIOLOGY

## 2022-09-23 DEVICE — IMPLANTABLE DEVICE: Type: IMPLANTABLE DEVICE | Site: SHOULDER | Status: FUNCTIONAL

## 2022-09-23 DEVICE — CEMENT BNE 40 GM RADIOPAQUE BA SIMPLEX P: Type: IMPLANTABLE DEVICE | Site: SHOULDER | Status: FUNCTIONAL

## 2022-09-23 DEVICE — COMPONENT TOT SHLDR CAPPED HA S2EXACTECH] EXACTECH INC]: Type: IMPLANTABLE DEVICE | Status: FUNCTIONAL

## 2022-09-23 RX ORDER — ALOGLIPTIN 25 MG/1
25 TABLET, FILM COATED ORAL DAILY
Status: DISCONTINUED | OUTPATIENT
Start: 2022-09-24 | End: 2022-09-24 | Stop reason: HOSPADM

## 2022-09-23 RX ORDER — DEXAMETHASONE SODIUM PHOSPHATE 10 MG/ML
INJECTION INTRAMUSCULAR; INTRAVENOUS PRN
Status: DISCONTINUED | OUTPATIENT
Start: 2022-09-23 | End: 2022-09-23 | Stop reason: SDUPTHER

## 2022-09-23 RX ORDER — SODIUM CHLORIDE 0.9 % (FLUSH) 0.9 %
5-40 SYRINGE (ML) INJECTION EVERY 12 HOURS SCHEDULED
Status: DISCONTINUED | OUTPATIENT
Start: 2022-09-23 | End: 2022-09-24 | Stop reason: HOSPADM

## 2022-09-23 RX ORDER — ACETAMINOPHEN 500 MG
1000 TABLET ORAL EVERY 6 HOURS PRN
Status: DISCONTINUED | OUTPATIENT
Start: 2022-09-23 | End: 2022-09-24 | Stop reason: HOSPADM

## 2022-09-23 RX ORDER — ACETAMINOPHEN 500 MG
1000 TABLET ORAL EVERY 6 HOURS PRN
Status: ON HOLD | COMMUNITY
End: 2022-09-24 | Stop reason: HOSPADM

## 2022-09-23 RX ORDER — DIPHENHYDRAMINE HYDROCHLORIDE 50 MG/ML
12.5 INJECTION INTRAMUSCULAR; INTRAVENOUS
Status: DISCONTINUED | OUTPATIENT
Start: 2022-09-23 | End: 2022-09-23 | Stop reason: HOSPADM

## 2022-09-23 RX ORDER — OXYCODONE AND ACETAMINOPHEN 7.5; 325 MG/1; MG/1
2 TABLET ORAL EVERY 4 HOURS PRN
Status: DISCONTINUED | OUTPATIENT
Start: 2022-09-23 | End: 2022-09-24 | Stop reason: HOSPADM

## 2022-09-23 RX ORDER — SODIUM CHLORIDE 9 MG/ML
INJECTION, SOLUTION INTRAVENOUS PRN
Status: DISCONTINUED | OUTPATIENT
Start: 2022-09-23 | End: 2022-09-23 | Stop reason: HOSPADM

## 2022-09-23 RX ORDER — LIDOCAINE HYDROCHLORIDE 20 MG/ML
INJECTION, SOLUTION EPIDURAL; INFILTRATION; INTRACAUDAL; PERINEURAL PRN
Status: DISCONTINUED | OUTPATIENT
Start: 2022-09-23 | End: 2022-09-23 | Stop reason: SDUPTHER

## 2022-09-23 RX ORDER — ONDANSETRON 2 MG/ML
4 INJECTION INTRAMUSCULAR; INTRAVENOUS EVERY 6 HOURS PRN
Status: DISCONTINUED | OUTPATIENT
Start: 2022-09-23 | End: 2022-09-24 | Stop reason: HOSPADM

## 2022-09-23 RX ORDER — ROCURONIUM BROMIDE 10 MG/ML
INJECTION, SOLUTION INTRAVENOUS PRN
Status: DISCONTINUED | OUTPATIENT
Start: 2022-09-23 | End: 2022-09-23 | Stop reason: SDUPTHER

## 2022-09-23 RX ORDER — ROPIVACAINE HYDROCHLORIDE 5 MG/ML
INJECTION, SOLUTION EPIDURAL; INFILTRATION; PERINEURAL PRN
Status: DISCONTINUED | OUTPATIENT
Start: 2022-09-23 | End: 2022-09-23 | Stop reason: HOSPADM

## 2022-09-23 RX ORDER — PROCHLORPERAZINE EDISYLATE 5 MG/ML
5 INJECTION INTRAMUSCULAR; INTRAVENOUS
Status: DISCONTINUED | OUTPATIENT
Start: 2022-09-23 | End: 2022-09-23 | Stop reason: HOSPADM

## 2022-09-23 RX ORDER — DIPHENHYDRAMINE HCL 25 MG
25 CAPSULE ORAL EVERY 6 HOURS PRN
Status: DISCONTINUED | OUTPATIENT
Start: 2022-09-23 | End: 2022-09-24 | Stop reason: HOSPADM

## 2022-09-23 RX ORDER — MIDAZOLAM HYDROCHLORIDE 2 MG/2ML
2 INJECTION, SOLUTION INTRAMUSCULAR; INTRAVENOUS
Status: COMPLETED | OUTPATIENT
Start: 2022-09-23 | End: 2022-09-23

## 2022-09-23 RX ORDER — OXYCODONE HYDROCHLORIDE 5 MG/1
5 TABLET ORAL PRN
Status: DISCONTINUED | OUTPATIENT
Start: 2022-09-23 | End: 2022-09-23 | Stop reason: HOSPADM

## 2022-09-23 RX ORDER — LIDOCAINE HYDROCHLORIDE 10 MG/ML
1 INJECTION, SOLUTION INFILTRATION; PERINEURAL
Status: COMPLETED | OUTPATIENT
Start: 2022-09-23 | End: 2022-09-23

## 2022-09-23 RX ORDER — SODIUM CHLORIDE 0.9 % (FLUSH) 0.9 %
5-40 SYRINGE (ML) INJECTION EVERY 12 HOURS SCHEDULED
Status: DISCONTINUED | OUTPATIENT
Start: 2022-09-23 | End: 2022-09-23 | Stop reason: HOSPADM

## 2022-09-23 RX ORDER — BUPIVACAINE HYDROCHLORIDE 5 MG/ML
INJECTION, SOLUTION EPIDURAL; INTRACAUDAL
Status: DISCONTINUED | OUTPATIENT
Start: 2022-09-23 | End: 2022-09-23 | Stop reason: SDUPTHER

## 2022-09-23 RX ORDER — ONDANSETRON 2 MG/ML
4 INJECTION INTRAMUSCULAR; INTRAVENOUS
Status: DISCONTINUED | OUTPATIENT
Start: 2022-09-23 | End: 2022-09-23 | Stop reason: HOSPADM

## 2022-09-23 RX ORDER — ZOLPIDEM TARTRATE 5 MG/1
5 TABLET ORAL NIGHTLY PRN
Status: DISCONTINUED | OUTPATIENT
Start: 2022-09-23 | End: 2022-09-24 | Stop reason: HOSPADM

## 2022-09-23 RX ORDER — ACETAMINOPHEN 500 MG
1000 TABLET ORAL ONCE
Status: DISCONTINUED | OUTPATIENT
Start: 2022-09-23 | End: 2022-09-23 | Stop reason: HOSPADM

## 2022-09-23 RX ORDER — HYDROMORPHONE HYDROCHLORIDE 1 MG/ML
0.5 INJECTION, SOLUTION INTRAMUSCULAR; INTRAVENOUS; SUBCUTANEOUS EVERY 5 MIN PRN
Status: DISCONTINUED | OUTPATIENT
Start: 2022-09-23 | End: 2022-09-23 | Stop reason: HOSPADM

## 2022-09-23 RX ORDER — GLYCOPYRROLATE 0.2 MG/ML
INJECTION INTRAMUSCULAR; INTRAVENOUS PRN
Status: DISCONTINUED | OUTPATIENT
Start: 2022-09-23 | End: 2022-09-23 | Stop reason: SDUPTHER

## 2022-09-23 RX ORDER — SODIUM CHLORIDE, SODIUM LACTATE, POTASSIUM CHLORIDE, CALCIUM CHLORIDE 600; 310; 30; 20 MG/100ML; MG/100ML; MG/100ML; MG/100ML
INJECTION, SOLUTION INTRAVENOUS CONTINUOUS
Status: DISCONTINUED | OUTPATIENT
Start: 2022-09-23 | End: 2022-09-24 | Stop reason: HOSPADM

## 2022-09-23 RX ORDER — LISINOPRIL AND HYDROCHLOROTHIAZIDE 12.5; 1 MG/1; MG/1
1 TABLET ORAL DAILY
Status: DISCONTINUED | OUTPATIENT
Start: 2022-09-24 | End: 2022-09-24 | Stop reason: HOSPADM

## 2022-09-23 RX ORDER — GABAPENTIN 300 MG/1
600 CAPSULE ORAL 2 TIMES DAILY
Status: DISCONTINUED | OUTPATIENT
Start: 2022-09-23 | End: 2022-09-24 | Stop reason: HOSPADM

## 2022-09-23 RX ORDER — SODIUM CHLORIDE 0.9 % (FLUSH) 0.9 %
5-40 SYRINGE (ML) INJECTION PRN
Status: DISCONTINUED | OUTPATIENT
Start: 2022-09-23 | End: 2022-09-23 | Stop reason: HOSPADM

## 2022-09-23 RX ORDER — ONDANSETRON 2 MG/ML
INJECTION INTRAMUSCULAR; INTRAVENOUS PRN
Status: DISCONTINUED | OUTPATIENT
Start: 2022-09-23 | End: 2022-09-23 | Stop reason: SDUPTHER

## 2022-09-23 RX ORDER — EPINEPHRINE 1 MG/ML(1)
AMPUL (ML) INJECTION PRN
Status: DISCONTINUED | OUTPATIENT
Start: 2022-09-23 | End: 2022-09-23 | Stop reason: HOSPADM

## 2022-09-23 RX ORDER — SODIUM CHLORIDE 0.9 % (FLUSH) 0.9 %
5-40 SYRINGE (ML) INJECTION PRN
Status: DISCONTINUED | OUTPATIENT
Start: 2022-09-23 | End: 2022-09-24 | Stop reason: HOSPADM

## 2022-09-23 RX ORDER — OXYCODONE AND ACETAMINOPHEN 7.5; 325 MG/1; MG/1
1 TABLET ORAL EVERY 4 HOURS PRN
Status: DISCONTINUED | OUTPATIENT
Start: 2022-09-23 | End: 2022-09-24 | Stop reason: HOSPADM

## 2022-09-23 RX ORDER — DIPHENHYDRAMINE HYDROCHLORIDE 50 MG/ML
25 INJECTION INTRAMUSCULAR; INTRAVENOUS EVERY 6 HOURS PRN
Status: DISCONTINUED | OUTPATIENT
Start: 2022-09-23 | End: 2022-09-24 | Stop reason: HOSPADM

## 2022-09-23 RX ORDER — TRANEXAMIC ACID 100 MG/ML
INJECTION, SOLUTION INTRAVENOUS PRN
Status: DISCONTINUED | OUTPATIENT
Start: 2022-09-23 | End: 2022-09-23 | Stop reason: SDUPTHER

## 2022-09-23 RX ORDER — SODIUM CHLORIDE, SODIUM LACTATE, POTASSIUM CHLORIDE, CALCIUM CHLORIDE 600; 310; 30; 20 MG/100ML; MG/100ML; MG/100ML; MG/100ML
INJECTION, SOLUTION INTRAVENOUS CONTINUOUS
Status: DISCONTINUED | OUTPATIENT
Start: 2022-09-23 | End: 2022-09-23 | Stop reason: HOSPADM

## 2022-09-23 RX ORDER — SODIUM CHLORIDE, SODIUM LACTATE, POTASSIUM CHLORIDE, CALCIUM CHLORIDE 600; 310; 30; 20 MG/100ML; MG/100ML; MG/100ML; MG/100ML
INJECTION, SOLUTION INTRAVENOUS CONTINUOUS PRN
Status: DISCONTINUED | OUTPATIENT
Start: 2022-09-23 | End: 2022-09-23 | Stop reason: SDUPTHER

## 2022-09-23 RX ORDER — NEOSTIGMINE METHYLSULFATE 1 MG/ML
INJECTION, SOLUTION INTRAVENOUS PRN
Status: DISCONTINUED | OUTPATIENT
Start: 2022-09-23 | End: 2022-09-23 | Stop reason: SDUPTHER

## 2022-09-23 RX ORDER — KETOROLAC TROMETHAMINE 30 MG/ML
INJECTION, SOLUTION INTRAMUSCULAR; INTRAVENOUS PRN
Status: DISCONTINUED | OUTPATIENT
Start: 2022-09-23 | End: 2022-09-23 | Stop reason: HOSPADM

## 2022-09-23 RX ORDER — SODIUM CHLORIDE 9 MG/ML
INJECTION, SOLUTION INTRAVENOUS CONTINUOUS
Status: DISCONTINUED | OUTPATIENT
Start: 2022-09-23 | End: 2022-09-24 | Stop reason: HOSPADM

## 2022-09-23 RX ORDER — PANTOPRAZOLE SODIUM 40 MG/1
40 TABLET, DELAYED RELEASE ORAL DAILY
Status: DISCONTINUED | OUTPATIENT
Start: 2022-09-24 | End: 2022-09-24 | Stop reason: HOSPADM

## 2022-09-23 RX ORDER — BUPIVACAINE HYDROCHLORIDE AND EPINEPHRINE 2.5; 5 MG/ML; UG/ML
INJECTION, SOLUTION EPIDURAL; INFILTRATION; INTRACAUDAL; PERINEURAL
Status: DISCONTINUED | OUTPATIENT
Start: 2022-09-23 | End: 2022-09-23 | Stop reason: SDUPTHER

## 2022-09-23 RX ORDER — VANCOMYCIN HYDROCHLORIDE 1 G/20ML
INJECTION, POWDER, LYOPHILIZED, FOR SOLUTION INTRAVENOUS PRN
Status: DISCONTINUED | OUTPATIENT
Start: 2022-09-23 | End: 2022-09-23 | Stop reason: HOSPADM

## 2022-09-23 RX ORDER — PROPOFOL 10 MG/ML
INJECTION, EMULSION INTRAVENOUS PRN
Status: DISCONTINUED | OUTPATIENT
Start: 2022-09-23 | End: 2022-09-23 | Stop reason: SDUPTHER

## 2022-09-23 RX ORDER — ONDANSETRON 4 MG/1
4 TABLET, ORALLY DISINTEGRATING ORAL EVERY 8 HOURS PRN
Status: DISCONTINUED | OUTPATIENT
Start: 2022-09-23 | End: 2022-09-24 | Stop reason: HOSPADM

## 2022-09-23 RX ORDER — SODIUM CHLORIDE 9 MG/ML
INJECTION, SOLUTION INTRAVENOUS PRN
Status: DISCONTINUED | OUTPATIENT
Start: 2022-09-23 | End: 2022-09-24 | Stop reason: HOSPADM

## 2022-09-23 RX ADMIN — ONDANSETRON 4 MG: 2 INJECTION INTRAMUSCULAR; INTRAVENOUS at 15:26

## 2022-09-23 RX ADMIN — ROCURONIUM BROMIDE 10 MG: 50 INJECTION, SOLUTION INTRAVENOUS at 14:33

## 2022-09-23 RX ADMIN — PHENYLEPHRINE HYDROCHLORIDE 100 MCG: 0.1 INJECTION, SOLUTION INTRAVENOUS at 15:25

## 2022-09-23 RX ADMIN — BUPIVACAINE HYDROCHLORIDE AND EPINEPHRINE BITARTRATE 10 ML: 2.5; .005 INJECTION, SOLUTION EPIDURAL; INFILTRATION; INTRACAUDAL; PERINEURAL at 12:02

## 2022-09-23 RX ADMIN — TRANEXAMIC ACID 1000 MG: 100 INJECTION, SOLUTION INTRAVENOUS at 15:08

## 2022-09-23 RX ADMIN — BUPIVACAINE HYDROCHLORIDE 20 ML: 5 INJECTION, SOLUTION EPIDURAL; INTRACAUDAL; PERINEURAL at 12:02

## 2022-09-23 RX ADMIN — Medication 3 AMPULE: at 10:56

## 2022-09-23 RX ADMIN — GLYCOPYRROLATE 0.4 MG: 0.2 INJECTION, SOLUTION INTRAMUSCULAR; INTRAVENOUS at 15:37

## 2022-09-23 RX ADMIN — DEXAMETHASONE SODIUM PHOSPHATE 10 MG: 10 INJECTION INTRAMUSCULAR; INTRAVENOUS at 12:55

## 2022-09-23 RX ADMIN — SODIUM CHLORIDE, PRESERVATIVE FREE 10 ML: 5 INJECTION INTRAVENOUS at 21:08

## 2022-09-23 RX ADMIN — LIDOCAINE HYDROCHLORIDE 100 MG: 20 INJECTION, SOLUTION EPIDURAL; INFILTRATION; INTRACAUDAL; PERINEURAL at 12:43

## 2022-09-23 RX ADMIN — TRANEXAMIC ACID 1000 MG: 100 INJECTION, SOLUTION INTRAVENOUS at 13:23

## 2022-09-23 RX ADMIN — CEFAZOLIN 2000 MG: 10 INJECTION, POWDER, FOR SOLUTION INTRAVENOUS at 21:08

## 2022-09-23 RX ADMIN — SODIUM CHLORIDE: 9 INJECTION, SOLUTION INTRAVENOUS at 18:16

## 2022-09-23 RX ADMIN — ROCURONIUM BROMIDE 10 MG: 50 INJECTION, SOLUTION INTRAVENOUS at 14:50

## 2022-09-23 RX ADMIN — SODIUM CHLORIDE, SODIUM LACTATE, POTASSIUM CHLORIDE, AND CALCIUM CHLORIDE: 600; 310; 30; 20 INJECTION, SOLUTION INTRAVENOUS at 13:30

## 2022-09-23 RX ADMIN — GABAPENTIN 600 MG: 300 CAPSULE ORAL at 21:06

## 2022-09-23 RX ADMIN — ROCURONIUM BROMIDE 20 MG: 50 INJECTION, SOLUTION INTRAVENOUS at 13:53

## 2022-09-23 RX ADMIN — Medication 2 G: at 13:00

## 2022-09-23 RX ADMIN — ROCURONIUM BROMIDE 10 MG: 50 INJECTION, SOLUTION INTRAVENOUS at 14:19

## 2022-09-23 RX ADMIN — EMPAGLIFLOZIN 10 MG: 10 TABLET, FILM COATED ORAL at 21:06

## 2022-09-23 RX ADMIN — SODIUM CHLORIDE, SODIUM LACTATE, POTASSIUM CHLORIDE, AND CALCIUM CHLORIDE: 600; 310; 30; 20 INJECTION, SOLUTION INTRAVENOUS at 11:12

## 2022-09-23 RX ADMIN — ASPIRIN 325 MG: 325 TABLET, COATED ORAL at 21:06

## 2022-09-23 RX ADMIN — PROPOFOL 200 MG: 10 INJECTION, EMULSION INTRAVENOUS at 12:43

## 2022-09-23 RX ADMIN — Medication 3 MG: at 15:37

## 2022-09-23 RX ADMIN — PHENYLEPHRINE HYDROCHLORIDE 100 MCG: 0.1 INJECTION, SOLUTION INTRAVENOUS at 14:49

## 2022-09-23 RX ADMIN — MEPIVACAINE HYDROCHLORIDE 10 ML: 15 INJECTION, SOLUTION EPIDURAL; INFILTRATION at 12:02

## 2022-09-23 RX ADMIN — SODIUM CHLORIDE, SODIUM LACTATE, POTASSIUM CHLORIDE, AND CALCIUM CHLORIDE: 600; 310; 30; 20 INJECTION, SOLUTION INTRAVENOUS at 12:38

## 2022-09-23 RX ADMIN — ROCURONIUM BROMIDE 10 MG: 50 INJECTION, SOLUTION INTRAVENOUS at 13:28

## 2022-09-23 RX ADMIN — ROCURONIUM BROMIDE 40 MG: 50 INJECTION, SOLUTION INTRAVENOUS at 12:44

## 2022-09-23 RX ADMIN — MIDAZOLAM HYDROCHLORIDE 2 MG: 1 INJECTION, SOLUTION INTRAMUSCULAR; INTRAVENOUS at 11:59

## 2022-09-23 RX ADMIN — OXYCODONE AND ACETAMINOPHEN 2 TABLET: 7.5; 325 TABLET ORAL at 23:48

## 2022-09-23 RX ADMIN — PHENYLEPHRINE HYDROCHLORIDE 100 MCG: 0.1 INJECTION, SOLUTION INTRAVENOUS at 15:09

## 2022-09-23 RX ADMIN — LIDOCAINE HYDROCHLORIDE 1 ML: 10 INJECTION, SOLUTION INFILTRATION; PERINEURAL at 11:11

## 2022-09-23 ASSESSMENT — PAIN DESCRIPTION - LOCATION: LOCATION: SHOULDER

## 2022-09-23 ASSESSMENT — PAIN - FUNCTIONAL ASSESSMENT
PAIN_FUNCTIONAL_ASSESSMENT: ACTIVITIES ARE NOT PREVENTED
PAIN_FUNCTIONAL_ASSESSMENT: 0-10

## 2022-09-23 ASSESSMENT — PAIN DESCRIPTION - ORIENTATION: ORIENTATION: LEFT

## 2022-09-23 ASSESSMENT — PAIN SCALES - GENERAL: PAINLEVEL_OUTOF10: 8

## 2022-09-23 ASSESSMENT — LIFESTYLE VARIABLES: SMOKING_STATUS: 0

## 2022-09-23 ASSESSMENT — PAIN DESCRIPTION - DESCRIPTORS: DESCRIPTORS: ACHING;SORE

## 2022-09-23 NOTE — OP NOTE
Operative Note    Date of Surgery: 9/23/2022       Preoperative diagnosis:  Arthritis of left shoulder region [M19.012]    Postoperative diagnosis: * No post-op diagnosis entered *    Surgeon(s) and Role:     * KEKE Boyer MD - Primary    Assistant:   none    Anesthesia: General, regional block    Antibiotics: Ancef 2 grams IV , I gram vancomycin powder in soft tissues. Procedures:  Procedure(s):  LEFT SHOULDER TOTAL ARTHROPLASTY  left Total Shoulder arthroplasty 15935  [id number]    Findings:  1. EUA - Flex 90, abd 80, ER -5    2. Patient with severe left glenohumeral arthritic change. Rotator cuff was still intact. Subscap was somewhat retracted. Patient was fairly tight with external rotation during the procedure as well. Biceps was tendinopathic and there was some tendon synovitic change present there. Indications / Consent: This is a patient who has severe shoulder arthritis. After previous discussions and treatments using both conservative and/or non-operative treatment options the patient elected to proceed with surgery due to continued symptoms. A review of the risks and benefits, including but not limited to infection, stiffness, injury to nerves and vessels, DVT, PE, MI, need for further operations and other anesthesia related risks was performed with the patient. After this review and the review of the likely outcome and potential complications of the procedure, preoperative verbal and written consents were obtained. The operative procedure and postoperative course were discussed with the patient in detail and the extremity was marked by the patient and myself. Procedure Details: The patient was given an anesthetic, placed semi sitting, the head was held in a neutral position, the legs were flexed and pillows were placed under the legs. A leblanc was placed. Txa 1 gram IV was given by anesthesia. An EUA was performed and noted above.  They were then padded and the operative shoulder was prepped with ChloraPrep and draped in the usual fashion. Prior to the beginning of the procedure, a time-out was performed for correct surgical site identification as was marked during the pre-operative meeting. This was confirmed using the written consent and history/physical. Time-out for antibiotic dosing, timing and selection was also performed. The operative arm was connected to an arm de leon. The incision site was injected with a mixture of epi, toradol and Naropin. A longitudinal incision was made from the clavicle over the coracoid and down in the deltopectoral interval. Dissection was carried down through the deltopectoral interval with bovie cauterization as needed. The cephalic vein was identified and freed up and retracted laterally. Dissection was carried down to the clavipectoral fascia and conjoined tendon. The subacromial space was then opened and a retractor was then placed under the acromion and under the deltoid. The conjoined tendon was freed up from the subscapularis anteriorly. A finger was placed on the axillary nerve and this position was confirmed. At this point a tonsil was placed into the rotator interval and the area of the subscap was identified superiorly. The pectoralis tendon was then partially incised about 1-2 cm from its superior border. The biceps tendon was then identified and released from the bicipital groove in an inferior to superior direction. The rotator interval was also released. The biceps tendon was then secured to the conjoint tendon with #2 non-absorbable suture and then tenodesed. The three sisters or vessels were cauterized and the subscapularis and capsule was resected away from the proximal humerus with progressive external rotation in an intra-tendonous fashion. Two #2 non-absorbable sutures were then used to tag the upper and lower subscapularis tendon. The shoulder was then progressively externally rotated and subluxated anteriorly.  Osteophytes were removed from the humeral head using a ronguer. The remaining proximal biceps was released from the glenoid. The rotator cuff attachment was confirmed and its attachment on the tuberosity was visualized. A posterior glenoid retractor was placed to push the proximal humerus posteriorly. The anterior and inferior capsule was then dissected from the subscapularis. The subscapularis was pulled up and with a finger on the axillary nerve the capsule on the deep side of the subscapularis was cut so that the subscapularis was freed from the anterior glenoid rim. The capsule was then cut at the inferior glenoid, again with care to protect the axillary nerve running posteriorly. Attention was then turned to the proximal humeral cut. With retraction around the proximal humerus a cut was made in the proximal humerus with the Zauber cutting guide to remove the articular surface. It was felt that this cut was well placed to the level, but not into the rotator cuff. After the cut had been performed any other further trimming of osteophytes was then accomplished. Using the 44 guide the drill pin was then inserted for stemless implant. A size 3 implant was felt to be appropriate based on the drill bit as well as the preoperative planning. The reamer was then used and then a size 3 punch was placed. The protective plate was placed over the top of this. After this had been done a Darrach retractor was placed on the proximal humerus levering on the posterior glenoid. After this had been done a posterior glenoid retractor was placed. The arm was externally rotated and extended and an anterior glenoid neck retractor was placed. Excessive labrum was removed at this point from around the glenoid. The glenoid was found to have hardly any cartilage. Soft tissue resection was performed around the glenoid and coracoid for the GPS tracking system preparation.  The tracker was then placed on the coracoid and screwed into position with the two screws from the set. It was felt to have good purchase and was stable. The tracker tip was then used to sync the system. Once all points were obtained and felt to be a good representation of the anatomy the center drill was used. Using the targeting guide from the GPS system, the center hole was drilled according to the preoperative plan. The gps reamer was then used to prepare the glenoid for the preplanned implant position. The peripheral guide was then placed and the peripheral peg holes were drilled into position. The center drill was replaced to make sure with the reaming that the depth was correct then the depth gauge was used. Next the appropriately sized glenoid trial was placed with good fit. The holes were then irrigated. Gelfoam and thrombin was then packed into the holes and the shoulder was then protected with a gauze sponge. At this point the cement was mixed for the glenoid prosthesis and when it was at the appropriate time the Gelfoam was removed from the glenoid holes and cement was injected into the peripheral holes. It was then pressurized using the pressurizing tool. The tool was then removed, the peripheral holes were then refilled with cement, the glenoid prosthesis with bone graft was then put into the glenoid and held until the cement was firm. At this point it was felt that the glenoid was firmly fixed into the glenoid scapular bone. The GPS tracker was removed from the coracoid. The retractors were removed, the shoulder was externally rotated, and the trial heads that were of the 44 mm diameter were then placed onto the prosthesis. Once appropriate stability was felt to be obtained with the trials the trial implant was then removed. The size 3 stemless device was then inserted with excellent fixation. The short 44 humeral head was then applied. After the prosthesis was checked the shoulder was reduced. It was placed through a ROM and felt to have a good relationship with the glenoid. The shoulder was again irrigated. The fiberwires were then used with grasping suture bites and placed into the subscapularis. After these had all been placed they were then tied down. It was felt the subscapuarlis was very secure at this point and several #2 non-absorbable sutures were also sutured to the anterior subscapularis medial and lateral tendon sites and tied as well. A #2 non-absorbable suture was placed laterally into the rotator interval. On the table 10-15 degrees of easy external rotation was able to be obtained after the subscap had been repaired. At this point the shoulder was again irrigated. The soft tissues were injected with a mixture of epi, toradol and Naropin along the posterior capsule and along the subcutaneous tissue. The rest of the vanc powder was placed anterior to the subscapularis. Counts were correct. The subcutaneous tissue was closed with a 2.0 monocryl. The skin was closed with a subcuticular 3.0 Quill. Dermabond was applied and a long sterile dressing was placed on the shoulder. The patient was put in a sling and returned to the recovery room in a satisfactory condition. There were no known intraoperative complications. Post-operative plan: Will begin TSA post op protocol with PROM in external rotation to 10 degrees. Estimated Blood Loss:   200 ml    Fluids:    see anesthesia record    Implant:   Implant Name Type Inv.  Item Serial No.  Lot No. LRB No. Used Action   CEMENT BNE 40 GM RADIOPAQUE BA SIMPLEX P - [id number]  CEMENT BNE 40 GM RADIOPAQUE BA SIMPLEX P [id number] [employer] [facility] [id number] Left 1 Implanted   AUGMENT KATHARINE FIX SM L SHLDR UHMWPE CAGE EQUINOXE - [id number]  AUGMENT KATHARINE FIX SM L SHLDR UHMWPE CAGE EQUINOXE [id number] [employer] [id number] Left 1 Implanted   HEAD HUM STEMLESS SHT 44 MM SHLDR ALPHA EQUINOXE - [id number]  HEAD HUM STEMLESS SHT 44  [facility] [id number] [employer] [id number] Left 1 Implanted   CAGE HUM STEMLESS 3 EQUINOXE - [id number]  CAGE HUM STEMLESS 3 EQUINOXE [id number] [facility] INC[employer]WD [id number] Left 1 Implanted       Closure: Primary    Complications: None    Signed By: Mile Mejia MD

## 2022-09-23 NOTE — PROGRESS NOTES
's pre-procedure visit requested by patient. Conveyed care and concern for patient and family. Offered prayer as requested for patient, family, and staff.     Lenore Diamond MDiv, BS  Board Certified

## 2022-09-23 NOTE — ANESTHESIA PRE PROCEDURE
Department of Anesthesiology  Preprocedure Note       Name:  Stefano Sharif   Age:  57 y.o.  :  1960                                          MRN:  [id number]         Date:  2022      Surgeon: Dione Sanchez): Luc Kwon MD    Procedure: Procedure(s):  LEFT SHOULDER TOTAL ARTHROPLASTY VS. REVERSE  -REGIONAL BLOCK  -23 HOUR OBSERVATION    Medications prior to admission:   Prior to Admission medications    Medication Sig Start Date End Date Taking? Authorizing Provider   acetaminophen (TYLENOL) 500 MG tablet Take 1,000 mg by mouth every 6 hours as needed for Pain   Yes Historical Provider, MD   lisinopril-hydroCHLOROthiazide (PRINZIDE;ZESTORETIC) 10-12.5 MG per tablet Take 1 tablet by mouth once daily 22   Teodoro Mendes MD   NONFORMULARY Testosterone compound cream 60 gram/ml apply daily    Historical Provider, MD MCKNIGHTUVIA 100 MG tablet Take 1 tablet by mouth once daily  Patient taking differently: Take by mouth at bedtime 22   Teodoro Mendes MD   vitamin D (ERGOCALCIFEROL) 1.25 MG (14646 UT) CAPS capsule Take 1 capsule by mouth once a week  Patient taking differently: Takes on Monday 8/10/22   Patti Velez MD   zolpidem (AMBIEN CR) 12.5 MG extended release tablet Take 1 tablet by mouth nightly as needed for Sleep for up to 180 days. 22  Teodoro Mendes MD   empagliflozin (JARDIANCE) 25 MG tablet Take 25 mg by mouth daily 22   Ar Automatic Reconciliation   gabapentin (NEURONTIN) 300 MG capsule Take 600 mg by mouth in the morning and at bedtime.  Two 300 mg tabs BID 22   Ar Automatic Reconciliation   linaclotide (LINZESS) 145 MCG capsule Take 145 mcg by mouth every morning (before breakfast) 22   Ar Automatic Reconciliation   pantoprazole (PROTONIX) 40 MG tablet Take 40 mg by mouth daily 22   Ar Automatic Reconciliation       Current medications:    Current Facility-Administered Medications   Medication Dose Route Frequency Provider Last Rate Last Admin    ceFAZolin (ANCEF) 2000 mg in sterile water 20 mL IV syringe  2,000 mg IntraVENous On Call to [address]        sodium chloride flush 0.9 % injection 5-40 mL  5-40 mL IntraVENous 2 times per day Monika Heard        sodium chloride flush 0.9 % injection 5-40 mL  5-40 mL IntraVENous PRN ADRIENNE Heard        0.9 % sodium chloride infusion   IntraVENous PRN ADRIENNE Heard        acetaminophen (TYLENOL) tablet 1,000 mg  1,000 mg Oral Once Michael Arceo MD        lactated ringers infusion   IntraVENous Continuous Michael Arceo  mL/hr at 09/23/22 1112 New Bag at 09/23/22 1112    sodium chloride flush 0.9 % injection 5-40 mL  5-40 mL IntraVENous 2 times per day Michael Arceo MD        sodium chloride flush 0.9 % injection 5-40 mL  5-40 mL IntraVENous PRN Michael Arceo MD        0.9 % sodium chloride infusion   IntraVENous PRN Michael Arceo MD        midazolam PF (VERSED) injection 2 mg  2 mg IntraVENous Once PRN Michael Arceo MD           Allergies:  No Known Allergies    Problem List:    Patient Active Problem List   Diagnosis Code    Shoulder pain, bilateral M25.511, M25.512    Knee pain, bilateral M25.561, M25.562    Neuropathic pain M79.2    Arthritis of right shoulder region M19.011    Cold hand without peripheral vascular disease R20.9    IBS (irritable bowel syndrome) K58.9    Paresthesia of both feet R20.2    Low testosterone R79.89    Gastroesophageal reflux disease without esophagitis K21.9    Localized osteoarthritis of right shoulder M19.011    Precordial pain R07.2    History of right shoulder replacement Z96.611    Abnormal clinical finding R68.89    History of colon polyps Z86.010    Elevated triglycerides with high cholesterol E78.2    Encounter for medication management Z79.899    Dysesthesia R20.8    Calculus of gallbladder without cholecystitis without obstruction K80.20    Elevated hemoglobin A1c R73.09    Type 2 diabetes mellitus (HCC) E11.9    Allergic rhinitis J30.9    Chronic bilateral low back pain with right-sided sciatica M54.41, G89.29    DDD (degenerative disc disease), lumbar M51.36    Severe obesity with body mass index (BMI) of 35.0 to 39.9 with serious comorbidity (HCC) E66.01    Facet arthropathy, lumbar M47.816    Spondylosis of lumbar region without myelopathy or radiculopathy M47.816    Arthritis of left shoulder region M19.012       Past Medical History:        Diagnosis Date    Elevated triglycerides with high cholesterol 4/16/2013    GERD (gastroesophageal reflux disease)     managed with medication     History of colon polyps 4/16/2013    8/10    Hypertension     medication    IBS (irritable bowel syndrome)     Insomnia     controlled with  Ambien    Knee pain, bilateral     Low testosterone 7/5/2017    Shoulder pain, bilateral     cortisone injections     Tendonitis     left shoulder    Testosterone deficiency     using testosterone gel    Type 2 diabetes mellitus ([facility] [address]     pt states pre-diabetic, FBS , A1c 5.7 (3/4/22), denies hypoglycemia    Unspecified sleep apnea 2011    pt says this is questionable, pt is not using CPAP (noted 2/23/22)     Vertigo 2021    hx        Past Surgical History:        Procedure Laterality Date    CATARACT REMOVAL Bilateral     IOL     CATARACT REMOVAL Bilateral 02/2022    CHOLECYSTECTOMY, LAPAROSCOPIC  05/2021    COLONOSCOPY  10/20/2021    GI  2001, 2010    colonoscopy    ORTHOPEDIC SURGERY  1970's    tendon graft right hand    ORTHOPEDIC SURGERY Right 03/04/2022    R shoulder replacement    OTHER SURGICAL HISTORY      fatty tumor back of neck       Social History:    Social History     Tobacco Use    Smoking status: Never    Smokeless tobacco: Never   Substance Use Topics    Alcohol use:  No                                Counseling given: Not Answered      Vital Signs (Current):   Vitals:    09/23/22 1015   BP: 98/75   Pulse: 79   Resp: 17   Temp: 98.2 °F (36.8 °C)   TempSrc: Temporal   SpO2: 97%   Weight: 229 lb 9.6 oz (104.1 kg)   Height: 6' (1.829 m)                                              BP Readings from Last 3 Encounters:   09/23/22 98/75   09/16/22 120/68   04/28/22 123/80       NPO Status: Time of last liquid consumption: 2100                        Time of last solid consumption: 1900                        Date of last liquid consumption: 09/22/22                        Date of last solid food consumption: 09/22/22    BMI:   Wt Readings from Last 3 Encounters:   09/23/22 229 lb 9.6 oz (104.1 kg)   09/16/22 234 lb (106.1 kg)   04/28/22 234 lb (106.1 kg)     Body mass index is 31.14 kg/m². CBC:   Lab Results   Component Value Date/Time    WBC 9.6 09/16/2022 12:09 PM    RBC 5.47 09/16/2022 12:09 PM    HGB 16.0 09/16/2022 12:09 PM    HCT 48.5 09/16/2022 12:09 PM    MCV 88.7 09/16/2022 12:09 PM    RDW 13.5 09/16/2022 12:09 PM     09/16/2022 12:09 PM       CMP:   Lab Results   Component Value Date/Time     09/16/2022 12:09 PM    K 3.8 09/16/2022 12:09 PM     09/16/2022 12:09 PM    CO2 29 09/16/2022 12:09 PM    BUN 18 09/16/2022 12:09 PM    CREATININE 0.76 09/16/2022 12:09 PM    GFRAA >60 09/16/2022 12:09 PM    AGRATIO 1.7 04/28/2022 11:21 AM    LABGLOM >60 09/16/2022 12:09 PM    GLUCOSE 92 09/16/2022 12:09 PM    PROT 6.8 04/28/2022 11:21 AM    CALCIUM 9.3 09/16/2022 12:09 PM    BILITOT 0.6 04/28/2022 11:21 AM    ALKPHOS 104 04/28/2022 11:21 AM    AST 13 04/28/2022 11:21 AM    ALT 11 04/28/2022 11:21 AM       POC Tests: No results for input(s): POCGLU, POCNA, POCK, POCCL, POCBUN, POCHEMO, POCHCT in the last 72 hours.     Coags:   Lab Results   Component Value Date/Time    PROTIME 12.5 05/06/2021 06:02 AM    INR 0.9 05/06/2021 06:02 AM       HCG (If Applicable): No results found for: PREGTESTUR, PREGSERUM, HCG, HCGQUANT     ABGs: No results found for: PHART, PO2ART, XTZ9TIB, JJK7FKG, BEART, T6JADLZW     Type & Screen (If Applicable):  No results found for: LABABO, [address]    Drug/Infectious Status (If Applicable):  No results found for: HIV, HEPCAB    COVID-19 Screening (If Applicable):   Lab Results   Component Value Date/Time    COVID19 Not Detected 03/01/2022 09:12 AM    COVID19 Performed 03/01/2022 09:12 AM           Anesthesia Evaluation  Patient summary reviewed and Nursing notes reviewed no history of anesthetic complications:   Airway: Mallampati: II  TM distance: >3 FB   Neck ROM: full  Mouth opening: > = 3 FB   Dental: normal exam         Pulmonary:normal exam    (+) sleep apnea (Doesn't use CPAP):      (-) not a current smoker                           Cardiovascular:  Exercise tolerance: good (>4 METS),   (+) hypertension:,         Rhythm: regular  Rate: normal                    Neuro/Psych:   Negative Neuro/Psych ROS              GI/Hepatic/Renal:   (+) GERD: well controlled,           Endo/Other:    (+) DiabetesType II DM, , .                 Abdominal:             Vascular: negative vascular ROS. Other Findings:           Anesthesia Plan      general and regional     ASA 3           MIPS: Postoperative opioids intended and Prophylactic antiemetics administered. Anesthetic plan and risks discussed with patient. Use of blood products discussed with patient whom consented to blood products.                      Corey Quigley MD   9/23/2022

## 2022-09-23 NOTE — ANESTHESIA PROCEDURE NOTES
Airway  Date/Time: 9/23/2022 1:10 PM  Urgency: elective    Airway not difficult    General Information and Staff    Patient location during procedure: OR  Anesthesiologist: Charlene Mcclure MD  Resident/CRNA: LIBRA Fitzpatrick CRNA  Other anesthesia staff: LIBRA Mc CRNA  Performed: other anesthesia staff     Indications and Patient Condition  Indications for airway management: anesthesia  Spontaneous Ventilation: absent  Sedation level: deep  Preoxygenated: yes  Patient position: sniffing  Mask difficulty assessment: vent by bag mask + OA or adjuvant +/- NMBA    Final Airway Details  Final airway type: endotracheal airway      Successful airway: ETT     Successful intubation technique: direct laryngoscopy  Facilitating devices/methods: cricoid pressure  Blade: Yareli  Blade size: #4  ETT size (mm): 8.0  Cormack-Lehane Classification: grade IIb - view of arytenoids or posterior of glottis only  Placement verified by: chest auscultation and capnometry   Measured from: lips  ETT to lips (cm): 24  Number of attempts at approach: 1

## 2022-09-23 NOTE — PROGRESS NOTES
09/23/22 1728   Oxygen Therapy/Pulse Ox   O2 Therapy Room air   O2 Device None (Room air)   O2 Flow Rate (L/min) 96 L/min   Heart Rate 77   Pulse Oximeter Device Mode Intermittent   $Pulse Oximeter $Spot check (single)   Patient achieved    2500    Ml/sec on IS. Patient encouraged to do every hour while awake-patient agreed and demonstrated. No shortness of breath or distress noted. BS are clear b/l.    [facility] notes reviewed- continuous sat ordered HS

## 2022-09-23 NOTE — DISCHARGE INSTRUCTIONS
Total Shoulder Replacement Postoperative Instructions    Returning Home    Care of your incisions: You have absorbable stitches which do not need to be removed. Your incision will be checked at your first visit. Moderate bleeding may occur at the incision sites. This should decrease quickly over time. Leave the dressings in place for 5-7 days. Then dressings may be removed and replaced with fresh gauze if there is any drainage. You may bath or shower but the incisions must be kept clean and dry. You may take your arm out of the sling for showering or bathing but being careful to avoid use of the arm. You may let your arm also hang at your side during showering or at your side during bathing. Watch the wound for increasing redness, tenderness, swelling and pus drainage daily. These can be early signs of infection. Please communicate any concerns. A slight temperature the first few days after surgery is not uncommon. This can be treated with deep breathing and coughing to clear the lungs. However, fevers (anything over 101°F), increasing pain, and swelling at the incisions should be reported immediately. Keep the wounds dry until your first follow-up visit. It is fine to loosen the sling and do elbow straightening and bending with the arm at the side. It is also good to move the wrist and hand. This can be done as much as you desire, but at least three times a day. You are to wear sling at all times except when doing the exercises as above and showers. Deep vein thrombosis (DVT) is a condition in which a blood clot has formed in a deep vein of the leg or arm. This may result in redness, swelling, warmth and/or pain of the leg/arm. Although these are very rare, there are things that can be done to lessen the risk.   It is recommended by your surgeon unless indicated otherwise, after arthroscopy; take an adult aspirin once a day for three weeks after surgery to help prevent the formation of blood clots. (Do not take aspirin against the advice of your medical doctor). Pain Management:    Pain after the surgery will vary from patient to patient. A certain amount of discomfort is normal and should not be alarming. We do recommend starting your prescription pain medications once you begin to experience a moderate amount of pain. If no relief is obtained call the office number on this sheet. Many pain medications contain Tylenol. Do not take additional Tylenol while taking the prescribed pain medication. Pain medications can cause constipation, so keep hydrated and consider over the counter additions like Metamucil or stool softener. Pain medications and antibiotics that are given during the sarah-operative time can cause itching and hives; over the counter Benadryl (25mg every 6 hours) can be helpful in treating this. If your pain is not adequately controlled, contact your physician at the numbers on this sheet. For the first week:  Icing for 15 minutes at a time may reduce your pain. Allow at least 30 minutes between ice applications. Some patients may have a cryotherapy cooler which can be used as it was during your stay at the hospital. It is fine to wear a light shirt underneath if needed to prevent skin irritation. Sleeping might be difficult. Some individuals find sleeping in a recliner or inclined with pillows or a foam wedge helpful. Putting a mitten on the hand of the affected shoulder can be helpful since some individuals find that warm hands help decrease pain. During this time nausea and light-headedness are common and should improve in 2-5 days. Drinking fluids and eating may help. If nausea persists, medicine can be prescribed by calling your doctor on the number(s) listed. Follow-up visits  Doctor  Plan on seeing your surgeon 10 days or so after surgery. Physical Therapy   Physical therapy will be set up on an individualized basis. ? You will want to line up a helper to assist you with your home therapy program for approximately the first 6 weeks. Optimally, this person can come to your first physical therapy visit to have the physical therapist show them how to do the home therapy. The home therapy exercises will need to be done daily for approximately 20 minutes. Your helper doesnt need any medical training; the therapist will show them what they need to know. If you call the office please have us paged instead of leaving a voice mail so that we can immediately take care of your needs.     [address] [facility]                 Phone [phone]  [facility]                 Fax [phone]            Jason Boyd [address]

## 2022-09-23 NOTE — ANESTHESIA PROCEDURE NOTES
Peripheral Block    Patient location during procedure: holding area  Reason for block: post-op pain management and at surgeon's request  Start time: 9/23/2022 11:58 AM  End time: 9/23/2022 12:02 PM  Staffing  Performed: anesthesiologist   Anesthesiologist: Cristina Polanco MD  Preanesthetic Checklist  Completed: patient identified, IV checked, site marked, risks and benefits discussed, surgical/procedural consents, equipment checked, pre-op evaluation, timeout performed, anesthesia consent given, oxygen available and monitors applied/VS acknowledged  Peripheral Block   Patient position: supine  Prep: ChloraPrep  Provider prep: mask and sterile gloves  Patient monitoring: cardiac monitor, continuous pulse ox, frequent blood pressure checks, IV access, oxygen and responsive to questions  Block type: Brachial plexus  Interscalene  Laterality: left  Injection technique: single-shot  Guidance: ultrasound guided  Local infiltration: lidocaine  Infiltration strength: 1 %  Local infiltration: lidocaine  Dose: 1 mL    Needle   Needle type: insulated echogenic nerve stimulator needle   Needle gauge: 21 G  Needle localization: anatomical landmarks, nerve stimulator and ultrasound guidance  Needle length: 5 cm  Assessment   Injection assessment: negative aspiration for heme, no paresthesia on injection, local visualized surrounding nerve on ultrasound and no intravascular symptoms  Paresthesia pain: none  Slow fractionated injection: yes  Hemodynamics: stable  Real-time US image taken/store: yes  Outcomes: uncomplicated    Additional Notes  Time out at 1158    20 mL 0.375% bupivacaine with 1:400,000 epinephrine and 10 mL 1.5% mepivacaine injected in incremental doses of 5 mL    Local anesthetic was visualized surrounding the nerve/block plane under real time ultrasound guidance. An image was obtained and placed on the chart.   The relevant block area was scanned before, during, and after the local anesthetic injection and no gross abnormalities were observed.        Medications Administered  bupivacaine-EPINEPHrine PF 0.25% -[id number] - Perineural   10 mL - 9/23/2022 12:02:00 PM  bupivacaine (PF) 0.5 % - Perineural   20 mL - 9/23/2022 12:02:00 PM  mepivacaine 1.5 % - Perineural   10 mL - 9/23/2022 12:02:00 PM

## 2022-09-23 NOTE — H&P
Name: Filiberto Cortés  YOB: 1960  Gender: male  MRN: [id number]    CC: Left shoulder(s) pain, stiffness    HPI:  This patient presents with a chronic history of left shoulder pain and limited motion. They have continued to suffer after exhausting all conservative options. We have reviewed all risks and benefits and have decided to proceed with shoulder replacement today.         No Known Allergies  Past Medical History:   Diagnosis Date    Elevated triglycerides with high cholesterol 4/16/2013    GERD (gastroesophageal reflux disease)     managed with medication     History of colon polyps 4/16/2013    8/10    Hypertension     medication    IBS (irritable bowel syndrome)     Insomnia     controlled with  Ambien    Knee pain, bilateral     Low testosterone 7/5/2017    Shoulder pain, bilateral     cortisone injections     Tendonitis     left shoulder    Testosterone deficiency     using testosterone gel    Type 2 diabetes mellitus ([facility] [address]     pt states pre-diabetic, FBS , A1c 5.7 (3/4/22), denies hypoglycemia    Unspecified sleep apnea 2011    pt says this is questionable, pt is not using CPAP (noted 2/23/22)     Vertigo 2021    hx      Past Surgical History:   Procedure Laterality Date    CATARACT REMOVAL Bilateral     IOL     CATARACT REMOVAL Bilateral 02/2022    CHOLECYSTECTOMY, LAPAROSCOPIC  05/2021    COLONOSCOPY  10/20/2021    GI  2001, 2010    colonoscopy    ORTHOPEDIC SURGERY  1970's    tendon graft right hand    ORTHOPEDIC SURGERY Right 03/04/2022    R shoulder replacement    OTHER SURGICAL HISTORY      fatty tumor back of neck     Family History   Problem Relation Age of Onset    Diabetes Mother     Hypertension Mother     Other Mother         severe post op N&V x1 after hip surgery    Heart Disease Mother     Hypertension Sister     Cancer Father         sinus cavity tumor    Heart Disease Father         congestive heart failure    Diabetes Brother      Social History Socioeconomic History    Marital status:      Spouse name: Not on file    Number of children: Not on file    Years of education: Not on file    Highest education level: Not on file   Occupational History    Not on file   Tobacco Use    Smoking status: Never    Smokeless tobacco: Never   Vaping Use    Vaping Use: Never used   Substance and Sexual Activity    Alcohol use: No    Drug use: No     Types: Prescription, OTC    Sexual activity: Not on file   Other Topics Concern    Not on file   Social History Narrative    Not on file     Social Determinants of Health     Financial Resource Strain: Not on file   Food Insecurity: Not on file   Transportation Needs: Not on file   Physical Activity: Not on file   Stress: Not on file   Social Connections: Not on file   Intimate Partner Violence: Not on file   Housing Stability: Not on file        No flowsheet data found. Review of Systems  Also noted on the patient medical history form on the chart and are reviewed today. Pertinent positives and negatives are addressed with the patient, particularly those related to musculoskeletal concerns. Non-orthopaedic concerns were referred back to the primary care physician. PE:    Vitals:    09/23/22 1015   BP: 98/75   Pulse: 79   Resp: 17   Temp: 98.2 °F (36.8 °C)   SpO2: 97%       GEN: General appearance is that of a healthy patient, alert and oriented, in no distress. WDWN. HEENT:  Normocephalic, atraumatic. Extraocular muscles are intact. Neck:  Supple, no lymphadenopathy. Heart:  Regular pulse exam on all extremities. Good color and warmth noted. Lungs:  Normal non-labored breathing with no obvious shortness of breath. Abdomen:  WNL's. Skin:  No signs of rash or bruising. Pysch: Answers questions appropriately, AO X 3. MSK:  Cervical spine: No tenderness.      SHOULDER   Right (involved)  left   Skin Intact Intact   Radial Pulses 2+ symmetrical  2+ symmetrical   Myotomes Normal Normal   Dermatomes Normal Normal   ROM Restricted Full   Strength 5 -/5 throughout No weakness   Atrophy None noted None noted   Effusion/Swelling  None None   Palpation Mild anterior tenderness No Tenderness   Bicep Tendon Rupture  - Negative   Bear Hug, Belly Press -,- Not tested   Crossed Arm Adduction Test Not tested Not tested   Instability/Ant. Apprehension Test None  None   Impingement limited Negative          Lab Results   Component Value Date    WBC 9.6 09/16/2022    HGB 16.0 09/16/2022    HCT 48.5 09/16/2022    MCV 88.7 09/16/2022     09/16/2022     Lab Results   Component Value Date/Time     09/16/2022 12:09 PM    K 3.8 09/16/2022 12:09 PM     09/16/2022 12:09 PM    CO2 29 09/16/2022 12:09 PM    BUN 18 09/16/2022 12:09 PM    CREATININE 0.76 09/16/2022 12:09 PM    GLUCOSE 92 09/16/2022 12:09 PM    CALCIUM 9.3 09/16/2022 12:09 PM          CT left shoulder from 8/8/22  FINDINGS: Large marginal osteophyte along the interarticular margin of the  humerus. Subchondral sclerosis with mild remodeling deformity and small  cysts/erosions noted more prominently along the superior half of the  articular margin of the humeral head. Uplifting of the humerus. Minimal bony hypertrophy of the glenoid. Several small subchondral cysts or  erosions noted in the glenoid. Largest cyst has a diameter of about 4 mm  within the anterior superior quadrant. Subchondral sclerosis noted. Moderate asymmetric joint narrowing of the inferior glenohumeral articulation. Small joint effusion. Loose body in the superior subscapular recess  estimated measure about 1 cm. 80 degrees retroversion. Glenoid stock estimated 22 mm. Walch type A1 configuration. Evidence of previous subacromial decompression. A few ossicles are noted  along the [facility] joint. The remainder of the bony shoulder girdle and bony thorax demonstrates no  acute suspicious findings.   Imaged lung demonstrates evidence suggesting mild congestion or possibly  small airways disease. Cardiomegaly. CONCLUSION: Glenohumeral osteoarthrosis with anatomic findings as described. Planing indicate stemless 3D versus preserve up to 14. If press-fit longstem approximately 13. Humeral head size between 44 and 47. A/Plan:     ICD-10-CM    1. Arthritis of left shoulder region  M19.012 Hemoglobin A1c     Type and screen     MSSA/MRSA SC by PCR NASAL SWAB    Added automatically from request for surgery [id number]          I had a long discussion with the patient regarding the natural history of the problem, as well as treatment options. Treatment:   The patient desires a  left total shoulder replacement and possible biceps subpectoral tenodesis, after they have exhausted all conservative options. I talked with them extensively about the risks, benefits, reasonable expectations and expected recovery time as well as possible complications including but not limited to bleeding, infection, wear of the components requiring revision, neurovascular injury, instability/dislocations, cosmetic deformity, stiffness, pain, continued problems, DVT, PE, hardware failure,  fracture, heterotopic ossification, MI and other anesthesia related risks, etc.   In the office I gave them education literature and answered their questions. They seem to understand and wish to proceed. The patient was counseled at length about the risks of khadra Covid-19 during their perioperative period and any recovery window from their procedure. The patient was made aware that khadra Covid-19  may worsen their prognosis for recovering from their procedure and lend to a higher morbidity and/or mortality risk. All material risks, benefits, and reasonable alternatives including postponing the procedure were discussed. The patient  wishes to proceed with the procedure at this time.       Rigo Dailey MD  09/23/22

## 2022-09-23 NOTE — PROGRESS NOTES
TRANSFER - IN REPORT:    Verbal report received from Te Villalobos on Mark Parsons  being received from PACU for routine post-op      Report consisted of patient's Situation, Background, Assessment and   Recommendations(SBAR). Information from the following report(s) Nurse Handoff Report was reviewed with the receiving nurse. Opportunity for questions and clarification was provided. Assessment completed upon patient's arrival to unit and care assumed.

## 2022-09-24 VITALS
RESPIRATION RATE: 16 BRPM | TEMPERATURE: 97.6 F | DIASTOLIC BLOOD PRESSURE: 62 MMHG | HEART RATE: 65 BPM | WEIGHT: 229.6 LBS | BODY MASS INDEX: 31.1 KG/M2 | HEIGHT: 72 IN | OXYGEN SATURATION: 98 % | SYSTOLIC BLOOD PRESSURE: 102 MMHG

## 2022-09-24 DIAGNOSIS — M19.012 ARTHRITIS OF LEFT SHOULDER REGION: Primary | ICD-10-CM

## 2022-09-24 LAB
HCT VFR BLD AUTO: 42.4 % (ref 41.1–50.3)
HGB BLD-MCNC: 14 G/DL (ref 13.6–17.2)

## 2022-09-24 PROCEDURE — 2580000003 HC RX 258: Performed by: PHYSICIAN ASSISTANT

## 2022-09-24 PROCEDURE — 97165 OT EVAL LOW COMPLEX 30 MIN: CPT

## 2022-09-24 PROCEDURE — 36415 COLL VENOUS BLD VENIPUNCTURE: CPT

## 2022-09-24 PROCEDURE — 99024 POSTOP FOLLOW-UP VISIT: CPT | Performed by: ORTHOPAEDIC SURGERY

## 2022-09-24 PROCEDURE — 6370000000 HC RX 637 (ALT 250 FOR IP): Performed by: PHYSICIAN ASSISTANT

## 2022-09-24 PROCEDURE — 6360000002 HC RX W HCPCS: Performed by: PHYSICIAN ASSISTANT

## 2022-09-24 PROCEDURE — 6370000000 HC RX 637 (ALT 250 FOR IP): Performed by: ORTHOPAEDIC SURGERY

## 2022-09-24 PROCEDURE — 85018 HEMOGLOBIN: CPT

## 2022-09-24 PROCEDURE — 97535 SELF CARE MNGMENT TRAINING: CPT

## 2022-09-24 PROCEDURE — 2500000003 HC RX 250 WO HCPCS: Performed by: PHYSICIAN ASSISTANT

## 2022-09-24 PROCEDURE — 97530 THERAPEUTIC ACTIVITIES: CPT

## 2022-09-24 PROCEDURE — 97161 PT EVAL LOW COMPLEX 20 MIN: CPT

## 2022-09-24 RX ORDER — AMOXICILLIN 250 MG
1 CAPSULE ORAL DAILY
Qty: 21 TABLET | Refills: 0 | Status: SHIPPED | OUTPATIENT
Start: 2022-09-24 | End: 2022-10-28 | Stop reason: ALTCHOICE

## 2022-09-24 RX ORDER — OXYCODONE AND ACETAMINOPHEN 7.5; 325 MG/1; MG/1
1-2 TABLET ORAL
Qty: 36 TABLET | Refills: 0 | Status: SHIPPED | OUTPATIENT
Start: 2022-09-24 | End: 2022-09-27

## 2022-09-24 RX ORDER — ONDANSETRON 8 MG/1
4 TABLET, ORALLY DISINTEGRATING ORAL EVERY 6 HOURS
Qty: 16 TABLET | Refills: 0 | Status: SHIPPED | OUTPATIENT
Start: 2022-09-24 | End: 2022-10-28 | Stop reason: ALTCHOICE

## 2022-09-24 RX ADMIN — OXYCODONE AND ACETAMINOPHEN 2 TABLET: 7.5; 325 TABLET ORAL at 03:52

## 2022-09-24 RX ADMIN — VANCOMYCIN HYDROCHLORIDE 1500 MG: 10 INJECTION, POWDER, LYOPHILIZED, FOR SOLUTION INTRAVENOUS at 04:32

## 2022-09-24 RX ADMIN — OXYCODONE AND ACETAMINOPHEN 2 TABLET: 7.5; 325 TABLET ORAL at 08:43

## 2022-09-24 RX ADMIN — GABAPENTIN 600 MG: 300 CAPSULE ORAL at 08:42

## 2022-09-24 RX ADMIN — ASPIRIN 325 MG: 325 TABLET, COATED ORAL at 08:42

## 2022-09-24 RX ADMIN — CEFAZOLIN 2000 MG: 10 INJECTION, POWDER, FOR SOLUTION INTRAVENOUS at 06:36

## 2022-09-24 RX ADMIN — LISINOPRIL AND HYDROCHLOROTHIAZIDE 1 TABLET: 12.5; 1 TABLET ORAL at 08:42

## 2022-09-24 RX ADMIN — PANTOPRAZOLE SODIUM 40 MG: 40 TABLET, DELAYED RELEASE ORAL at 08:49

## 2022-09-24 RX ADMIN — ALOGLIPTIN 25 MG: 25 TABLET, FILM COATED ORAL at 08:42

## 2022-09-24 ASSESSMENT — PAIN SCALES - GENERAL
PAINLEVEL_OUTOF10: 6
PAINLEVEL_OUTOF10: 7
PAINLEVEL_OUTOF10: 2
PAINLEVEL_OUTOF10: 2
PAINLEVEL_OUTOF10: 6

## 2022-09-24 ASSESSMENT — PAIN DESCRIPTION - PAIN TYPE: TYPE: SURGICAL PAIN

## 2022-09-24 ASSESSMENT — PAIN - FUNCTIONAL ASSESSMENT: PAIN_FUNCTIONAL_ASSESSMENT: ACTIVITIES ARE NOT PREVENTED

## 2022-09-24 ASSESSMENT — PAIN DESCRIPTION - DESCRIPTORS
DESCRIPTORS: ACHING
DESCRIPTORS: ACHING;SORE

## 2022-09-24 ASSESSMENT — PAIN DESCRIPTION - ORIENTATION
ORIENTATION: LEFT
ORIENTATION: LEFT

## 2022-09-24 ASSESSMENT — PAIN DESCRIPTION - LOCATION: LOCATION: SHOULDER

## 2022-09-24 NOTE — CARE COORDINATION
09/24/22 1118   Service Assessment   Patient Orientation Alert and Oriented   Cognition Alert   History Provided By Patient   Primary Caregiver Self   Support Systems Spouse/Significant Other  (wife/Winnie)   PCP Verified by CM Yes   Prior Functional Level Independent in ADLs/IADLs   Current Functional Level Independent in ADLs/IADLs   Can patient return to prior living arrangement Yes   Ability to make needs known: Good   Family able to assist with home care needs: Yes   Would you like for me to discuss the discharge plan with any other family members/significant others, and if so, who? No   Social/Functional History   Lives With Spouse   Type of Home House   ADL Assistance Independent   Ambulation Assistance Independent   Active  Yes   Mode of Transportation Car   Discharge Planning   Patient expects to be discharged to: . [address] Discharge   Mode of Transport at Discharge   (family)   Confirm Follow Up Transport Family   Condition of Participation: Discharge Planning   Freedom of Choice list was provided with basic dialogue that supports the patient's individualized plan of care/goals, treatment preferences, and shares the quality data associated with the providers? Yes   Visited with pt to establish prior to admission baseline and discuss their anticipated goals at time of d/c. Pt lives at home w/family, inde with ADL's, had Rt shoulder replaced in March. Pt goal to be d/c home with family, denies any needs. Per MD pt to be d/c today. CM signing off.

## 2022-09-24 NOTE — PROGRESS NOTES
09/23/22 2040   Oxygen Therapy/Pulse Ox   O2 Therapy Room air   O2 Device None (Room air)   Heart Rate 83   SpO2 94 %   Pulse Oximeter Device Mode Continuous   Pulse Oximeter Device Location Left;Finger   $Pulse Oximeter $Spot check (multiple/continuous)   Pt on continuous monitor for HS. Alarm limits set. Pt working on IS.

## 2022-09-24 NOTE — PROGRESS NOTES
2022         Post Op day: 1 Day Post-Op     Admit Date: 2022    Admit Diagnosis: Arthritis of left shoulder region [M19.012]        Subjective: Patient stable. No acute events.       Objective:     Vitals:    22 0702   BP: 102/62   Pulse: 65   Resp: 16   Temp: 97.6 °F (36.4 °C)   SpO2: 98%    Temp (24hrs), Av.9 °F (36.6 °C), Min:97.6 °F (36.4 °C), Max:98.2 °F (36.8 °C)      Lab Results   Component Value Date/Time    HGB 14.0 2022 03:20 AM       Patient Active Problem List   Diagnosis    Shoulder pain, bilateral    Knee pain, bilateral    Neuropathic pain    Arthritis of right shoulder region    Cold hand without peripheral vascular disease    IBS (irritable bowel syndrome)    Paresthesia of both feet    Low testosterone    Gastroesophageal reflux disease without esophagitis    Localized osteoarthritis of right shoulder    Precordial pain    History of right shoulder replacement    Abnormal clinical finding    History of colon polyps    Elevated triglycerides with high cholesterol    Encounter for medication management    Dysesthesia    Calculus of gallbladder without cholecystitis without obstruction    Elevated hemoglobin A1c    Type 2 diabetes mellitus (HCC)    Allergic rhinitis    Chronic bilateral low back pain with right-sided sciatica    DDD (degenerative disc disease), lumbar    Severe obesity with body mass index (BMI) of 35.0 to 39.9 with serious comorbidity (HCC)    Facet arthropathy, lumbar    Spondylosis of lumbar region without myelopathy or radiculopathy    Arthritis of left shoulder region       Current Facility-Administered Medications   Medication Dose Route Frequency    0.9 % sodium chloride infusion   IntraVENous Continuous    sodium chloride flush 0.9 % injection 5-40 mL  5-40 mL IntraVENous 2 times per day    sodium chloride flush 0.9 % injection 5-40 mL  5-40 mL IntraVENous PRN    0.9 % sodium chloride infusion   IntraVENous PRN    ondansetron (ZOFRAN-ODT) disintegrating tablet 4 mg  4 mg Oral Q8H PRN    Or    ondansetron (ZOFRAN) injection 4 mg  4 mg IntraVENous Q6H PRN    aspirin EC tablet 325 mg  325 mg Oral BID    diphenhydrAMINE (BENADRYL) capsule 25 mg  25 mg Oral Q6H PRN    Or    diphenhydrAMINE (BENADRYL) injection 25 mg  25 mg IntraVENous Q6H PRN    oxyCODONE-acetaminophen (PERCOCET) 7.5-325 MG per tablet 1 tablet  1 tablet Oral Q4H PRN    Or    oxyCODONE-acetaminophen (PERCOCET) 7.5-325 MG per tablet 2 tablet  2 tablet Oral Q4H PRN    lactated ringers infusion   IntraVENous Continuous    acetaminophen (TYLENOL) tablet 1,000 mg  1,000 mg Oral Q6H PRN    empagliflozin (JARDIANCE) tablet 10 mg  10 mg Oral Nightly    gabapentin (NEURONTIN) capsule 600 mg  600 mg Oral BID    alogliptin (NESINA) tablet 25 mg  25 mg Oral Daily    linaclotide (LINZESS) capsule 145 mcg   (Patient Supplied)  145 mcg Oral QAM AC    lisinopril-hydroCHLOROthiazide (PRINZIDE;ZESTORETIC) 10-12.5 MG per tablet 1 tablet  1 tablet Oral Daily    pantoprazole (PROTONIX) tablet 40 mg  40 mg Oral Daily    zolpidem (AMBIEN) tablet 5 mg  5 mg Oral Nightly PRN       Extremity Exam  Dressing clean and dry   Sensation intact to light touch on operative limb except C6 distribution  Extremity perfused  TEDS/SCDS in place  No sign of DVT     Assessment / Plan :  Continue PT/OT  DIspo-HH         Signed By: Corrinne Bedford, MD     I have reviewed the patients controlled substance prescription history, as maintained in the [facility] prescription monitoring program, so that the prescription(s) for a  controlled substance can be given.

## 2022-09-24 NOTE — PROGRESS NOTES
ACUTE PHYSICAL THERAPY GOALS:   (Developed with and agreed upon by patient and/or caregiver.)  Pt will perform supine to/from sit with independence in 7 treatment days. Pt will perform sit to/from stand with independence and no AD in 7 treatment days. Pt will ambulate 150 ft with independence and no AD in 7 treatment days. Pt will be independent with HEP in 7 days. PHYSICAL THERAPY: SHOULDER Initial Assessment, Daily Note, and AM  (Link to Caseload Tracking: PT Visit Days : 1  Acknowledge Orders Time In/Out  PT Charge Capture  Rehab Caseload Tracker    Jun Persaud is a 57 y.o. male   PRIMARY DIAGNOSIS: Arthritis of left shoulder region  Arthritis of left shoulder region [M19.012]  Procedure(s) (LRB):  LEFT SHOULDER TOTAL ARTHROPLASTY (Left)  1 Day Post-Op  Reason for Referral: Pain in Left Shoulder (M25.512)  Stiffness of Left Shoulder, Not elsewhere classified (M25.612)    Outpatient in a bed: Payor: Nadege Clause / Plan: Denys Bearded / Product Type: *No Product type* /     MOVEMENT PRECAUTIONS   TSA post op protocol with PROM in external rotation to 10 degrees. REHAB RECOMMENDATIONS:   Recommendation to date pending progress:  Setting:  Outpatient Therapy    Equipment:    None     ASSESSMENT:   ASSESSMENT:  Mr. Latrell Traore is a 57 y.o. male s/p L TSA 9/23. Upon PT evaluation, pt exhibits expected post-op strength/ROM deficits, NWB LUE, high pain levels, and post-op precautions resulting in limited independence with functional mobility. At baseline, pt is independent with all mobility. Pt is now requiring supervision for mobility and while performing HEP. Pt will require outpatient at discharge. Pt will continue to benefit from skilled PT to address above impairments and maximize functional independence prior to discharge.   Brian Mclaughlin AM-PAC 6 Clicks Basic Mobility Inpatient Short Form  AM-PAC Mobility Inpatient   How much difficulty turning over in bed?: A Little  How much difficulty sitting down on / standing up from a chair with arms?: A Little  How much difficulty moving from lying on back to sitting on side of bed?: A Little  How much help from another person moving to and from a bed to a chair?: A Little  How much help from another person needed to walk in hospital room?: A Little  How much help from another person for climbing 3-5 steps with a railing?: A Little  AM-PAC Inpatient Mobility Raw Score : 18  AM-PAC Inpatient T-Scale Score : 43.63  Mobility Inpatient CMS 0-100% Score: 46.58  Mobility Inpatient CMS G-Code Modifier : CK    SUBJECTIVE:   Mr. Juliette Rodriguez states, \"I'm ready to go home\"     Social/Functional Lives With: Spouse  Type of Home: House  Bathroom Shower/Tub: Tub/Shower unit  Bathroom Equipment: Grab bars in shower  ADL Assistance: Independent  Ambulation Assistance: Independent  Active : Yes  Mode of Transportation: Car    OBJECTIVE:     PAIN: VITAL SIGNS: LINES/DRAINS:   Pre Treatment:  Pain Assessment: 0-10  Pain Level: 6      Post Treatment: 6 Vitals        Oxygen    IV    RESTRICTIONS/PRECAUTIONS:  Restrictions/Precautions: Weight Bearing, Surgical Protocols  Required Braces or Orthoses?: Yes     Left Upper Extremity Weight Bearing: Non Weight Bearing     Restrictions/Precautions: Weight Bearing, Surgical Protocols  Required Braces or Orthoses?: Yes  Left Upper Extremity Brace/Splint: Sling     HAND DOMINANCE:  Left []  Right [x]      UPPER EXTREMITY GROSS EVALUATION:  RIGHT UE   Within Functional Limits   Abnormal   Comments   Strength [x] []     Active Range of Motion [x] []     Passive Range of Motion           [x] []        LEFT UE Within Functional Limits   Abnormal   Comments   Strength [] [x]  Not tested due to shoulder precautions. Active Range of Motion [] [x]  Not tested due to shoulder precautions. Passive Range of Motion [] [x]  Not tested due to shoulder precautions.      LOWER EXTREMITY GROSS EVALUATION:     Within Functional Limits Abnormal   Comments   Strength [x] []    Range of Motion [x] []        COGNITION/  PERCEPTION: Intact Impaired (Comments):   Orientation [x]     Vision [x]     Hearing [x]     Cognition  [x]       MOBILITY: I Mod I S SBA CGA Min Mod Max Total  NT x2 Comments:   Bed Mobility    Rolling [] [] [] [] [] [] [] [] [] [x] []    Supine to Sit [] [] [] [] [] [] [] [] [] [x] []    Scooting [] [] [x] [] [] [] [] [] [] [] []    Sit to Supine [] [] [] [] [] [] [] [] [] [x] []    Transfers    Sit to Stand [] [] [x] [] [] [] [] [] [] [] []    Bed to Chair [] [] [x] [] [] [] [] [] [] [] []    Stand to Sit [] [] [x] [] [] [] [] [] [] [] []    Stand Pivot [] [] [] [] [] [] [] [] [] [x] []    Toilet [] [] [] [] [] [] [] [] [] [x] []     [] [] [] [] [] [] [] [] [] [] []    I=Independent, Mod I=Modified Independent, S=Supervision, SBA=Standby Assistance, CGA=Contact Guard Assistance,   Min=Minimal Assistance, Mod=Moderate Assistance, Max=Maximal Assistance, Total=Total Assistance, NT=Not Tested    GAIT: I Mod I S SBA CGA Min Mod Max Total  NT x2 Comments:   Level of Assistance [] [] [x] [] [] [] [] [] [] [] []    Weightbearing Status  Restrictions/Precautions  Restrictions/Precautions: Weight Bearing, Surgical Protocols  Required Braces or Orthoses?: Yes    Distance    feet    Gait Quality Decreased melissa  and Decreased step length    DME None     Stairs      I=Independent, Mod I=Modified Independent, S=Supervision, SBA=Standby Assistance, CGA=Contact Guard Assistance,   Min=Minimal Assistance, Mod=Moderate Assistance, Max=Maximal Assistance, Total=Total Assistance, NT=Not Tested    BALANCE: Good Fair+ Fair Fair- Poor NT Comments   Sitting Static [] [] [] [] [] []    Sitting Dynamic [] [] [] [] [] []              Standing Static [] [] [] [] [] []    Standing Dynamic [] [] [] [] [] []      PLAN:   FREQUENCY AND DURATION: BID for duration of hospital stay or until stated goals are met, whichever comes first.    THERAPY PROGNOSIS: Excellent    PROBLEM LIST:   (Skilled intervention is medically necessary to address:)  Decreased ADL/Functional Activities  Decreased Activity Tolerance  Decreased AROM/PROM  Decreased Strength  Decreased Transfer Abilities  Increased Pain   INTERVENTIONS PLANNED:   (Benefits and precautions of physical therapy have been discussed with the patient.)  Therapeutic Activity  Therapeutic Exercise/HEP  Neuromuscular Re-education  Gait Training  Manual Therapy  Education       TREATMENT:   EVALUATION: LOW COMPLEXITY: (Untimed Charge)    TREATMENT:   Therapeutic Activity (10 Minutes): Therapeutic activity included Scooting, Lateral Scooting, Transfer Training, Ambulation on level ground, Sitting balance , Standing balance, and education to improve functional Activity tolerance, Mobility, Strength, and ROM. Therapeutic Exercise (5 Minutes): Therapeutic exercises noted below to improve functional AROM, strength, and mobility. TREATMENT GRID:   Date:  9/24 Date:   Date:     ACTIVITY/EXERCISE: AM PM AM PM AM PM   Gripping 10        Wrist Flexion/Extension 10        Wrist Ulnar/Radial Deviation 10        Pronation/Supination 10        Elbow Flexion/Extension 10aa        Shoulder Flexion/Extension         Shoulder AB/ADduction         Shoulder IR/ER         Pulleys         Pendulums 3 x 30s        Shrugs 10        ISOMETRIC:                          Flexion         Extension         ABduction         ADduction         Biceps/Triceps         B = bilateral; AA = active assistive; A = active; P = passive      EDUCATION:    EDUCATION:  [x]  Home Exercises  [x]  Sling Application [x]  Movement Precautions   []  Pulleys [x]  Use of Ice  []  Other:      AFTER TREATMENT PRECAUTIONS: Call light within reach, Chair, Needs within reach, and RN notified    INTERDISCIPLINARY COLLABORATION:  RN/ PCT, PT/ PTA, and OT/ EDMONDS    COMPLIANCE WITH PROGRAM/EXERCISES: Will assess as treatment progresses.     RECOMMENDATIONS/INTENT FOR NEXT TREATMENT SESSION: Treatment next visit will focus on increasing Mr. Francis's independence with bed mobility, transfers, gait training, strength/ROM exercises, modalities for pain, and patient education.      TIME IN/OUT:  Time In: 0915  Time Out: 0935  Minutes: [address] [facility],[address], PT

## 2022-10-05 ENCOUNTER — OFFICE VISIT (OUTPATIENT)
Dept: ORTHOPEDIC SURGERY | Age: 62
End: 2022-10-05

## 2022-10-05 DIAGNOSIS — Z96.612 S/P REVERSE TOTAL SHOULDER ARTHROPLASTY, LEFT: ICD-10-CM

## 2022-10-05 DIAGNOSIS — M19.012 ARTHRITIS OF LEFT SHOULDER REGION: Primary | ICD-10-CM

## 2022-10-05 PROCEDURE — 99024 POSTOP FOLLOW-UP VISIT: CPT | Performed by: ORTHOPAEDIC SURGERY

## 2022-10-05 NOTE — PROGRESS NOTES
Name: Gloria London  YOB: 1960  Gender: male  MRN: [id number]    CC:   Chief Complaint   Patient presents with    Follow-up     1st post op left TSA DOS 9-23-22   , Patient is here to follow-up one week status post left TSA. Left Shoulder Total Arthroplasty - Left  9/23/2022    HPI: The patient is doing well and as expected. The patient has been following protocol and comes into the office today with use of the sling. She is here to little more trouble for 72 hours but now is back to baseline and doing well. Review of Systems  Noncontributory     PE left shoulder: Operative shoulder exam:  The incisions are clean, dry and intact. There is no sign of infection. The axillary sensation is intact. The deltoid muscle has good firing. The shoulder is supple. They are neurovascularly intact. Range of Motion was not tested today. X-ray:  AP and Axillary of the left shoulder views show intact prosthesis and the components in place. No fractures are evident. AP:     ICD-10-CM    1. Arthritis of left shoulder region  M19.012 XR SHOULDER LEFT (MIN 2 VIEWS)      2. S/P reverse total shoulder arthroplasty, left  Z96.612 XR SHOULDER LEFT (MIN 2 VIEWS)        The patient is doing well one week status post TSA. They were given a Physical Therapy prescription and protocol for a left total Shoulder Arthroplasty. They will begin physical therapy this week if they have not already started. Return in about 3 weeks (around 10/26/2022) for TSA x-ray fu Grashey / Axillary. They need to return to the clinic in 4 weeks time for re-evaluation and repeat xrays.      Bethany Ribera MD  10/05/22

## 2022-10-25 ENCOUNTER — TELEPHONE (OUTPATIENT)
Dept: ORTHOPEDIC SURGERY | Age: 62
End: 2022-10-25

## 2022-10-25 NOTE — TELEPHONE ENCOUNTER
Pt called would a peer to peer done.      Christian Emmanuel authorizations phone number: [phone]

## 2022-10-25 NOTE — TELEPHONE ENCOUNTER
Spoke with pt and his PT visits for the year have run out and he was asking if we could help him with that. Since he's been in contact with his insurance I asked him to call them and ask for a peer to peer line for PT that Shital Javier can call to see if she can get more visits approved. He said he would call and then let us know.

## 2022-10-27 NOTE — TELEPHONE ENCOUNTER
Spoke with insurance and put in the request for PT to be extended. They asked for op note and last visit note to be faxed over for review for approval. I have faxed over those documents. I let pt know all of this and told him to let us know if he heard that they needed anything further.

## 2022-10-28 ENCOUNTER — OFFICE VISIT (OUTPATIENT)
Dept: FAMILY MEDICINE CLINIC | Facility: CLINIC | Age: 62
End: 2022-10-28
Payer: COMMERCIAL

## 2022-10-28 VITALS
HEIGHT: 72 IN | DIASTOLIC BLOOD PRESSURE: 81 MMHG | WEIGHT: 232.2 LBS | HEART RATE: 80 BPM | RESPIRATION RATE: 16 BRPM | SYSTOLIC BLOOD PRESSURE: 109 MMHG | TEMPERATURE: 98 F | BODY MASS INDEX: 31.45 KG/M2 | OXYGEN SATURATION: 99 %

## 2022-10-28 DIAGNOSIS — E78.2 MIXED HYPERLIPIDEMIA: ICD-10-CM

## 2022-10-28 DIAGNOSIS — I10 ESSENTIAL HYPERTENSION: ICD-10-CM

## 2022-10-28 DIAGNOSIS — E55.9 VITAMIN D DEFICIENCY, UNSPECIFIED: ICD-10-CM

## 2022-10-28 DIAGNOSIS — B35.1 ONYCHOMYCOSIS: ICD-10-CM

## 2022-10-28 DIAGNOSIS — M79.671 BILATERAL FOOT PAIN: ICD-10-CM

## 2022-10-28 DIAGNOSIS — M79.672 BILATERAL FOOT PAIN: ICD-10-CM

## 2022-10-28 DIAGNOSIS — E03.9 ACQUIRED HYPOTHYROIDISM: ICD-10-CM

## 2022-10-28 DIAGNOSIS — G62.9 POLYNEUROPATHY, UNSPECIFIED: Primary | ICD-10-CM

## 2022-10-28 DIAGNOSIS — E11.65 TYPE 2 DIABETES MELLITUS WITH HYPERGLYCEMIA, WITHOUT LONG-TERM CURRENT USE OF INSULIN (HCC): ICD-10-CM

## 2022-10-28 PROCEDURE — 3044F HG A1C LEVEL LT 7.0%: CPT | Performed by: FAMILY MEDICINE

## 2022-10-28 PROCEDURE — 3074F SYST BP LT 130 MM HG: CPT | Performed by: FAMILY MEDICINE

## 2022-10-28 PROCEDURE — 3078F DIAST BP <80 MM HG: CPT | Performed by: FAMILY MEDICINE

## 2022-10-28 PROCEDURE — 99213 OFFICE O/P EST LOW 20 MIN: CPT | Performed by: FAMILY MEDICINE

## 2022-10-28 RX ORDER — PREGABALIN 75 MG/1
150 CAPSULE ORAL 2 TIMES DAILY
Qty: 120 CAPSULE | Refills: 5 | Status: SHIPPED | OUTPATIENT
Start: 2022-10-28 | End: 2022-11-16 | Stop reason: SDUPTHER

## 2022-10-29 NOTE — PROGRESS NOTES
Subjective:      Patient ID: Paula Greer is a 57 y.o. male. HPI  Patient comes in today for follow-up on his hypertension, neuropathy and medication refills. Patient overall has been doing well but he continues to have some pain in his feet related to neuropathy and numbness which seems to be worse at night. The gabapentin does not seem to be helping very well with that. He does have some issues with both of his feet as he has noticed some overlapping of the great toe on the second toe bilaterally and flattened arches. We will put in a referral to podiatry but he has not heard about that. The patient has had shoulder replacement surgery on both of his shoulders and has been getting physical therapy for that and is doing much better in regards to pain  Past Medical History:   Diagnosis Date    Elevated triglycerides with high cholesterol 4/16/2013    GERD (gastroesophageal reflux disease)     managed with medication     History of colon polyps 4/16/2013    8/10    Hypertension     medication    IBS (irritable bowel syndrome)     Insomnia     controlled with  Ambien    Knee pain, bilateral     Low testosterone 7/5/2017    Shoulder pain, bilateral     cortisone injections     Tendonitis     left shoulder    Testosterone deficiency     using testosterone gel    Type 2 diabetes mellitus ([facility] [address]     pt states pre-diabetic, FBS , A1c 5.7 (3/4/22), denies hypoglycemia    Unspecified sleep apnea 2011    pt says this is questionable, pt is not using CPAP (noted 2/23/22)     Vertigo 2021    hx        No Known Allergies    Current Outpatient Medications   Medication Sig Dispense Refill    pregabalin (LYRICA) 75 MG capsule Take 2 capsules by mouth 2 times daily for 180 days. 120 capsule 5    ciclopirox (PENLAC) 8 % solution Apply topically nightly.  6 mL 3    lisinopril-hydroCHLOROthiazide (PRINZIDE;ZESTORETIC) 10-12.5 MG per tablet Take 1 tablet by mouth once daily 30 tablet 0    NONFORMULARY Testosterone compound cream 60 gram/ml apply daily      JANUVIA 100 MG tablet Take 1 tablet by mouth once daily (Patient taking differently: Take by mouth at bedtime) 90 tablet 1    vitamin D (ERGOCALCIFEROL) 1.25 MG (47158 UT) CAPS capsule Take 1 capsule by mouth once a week (Patient taking differently: Takes on Monday) 4 capsule 0    zolpidem (AMBIEN CR) 12.5 MG extended release tablet Take 1 tablet by mouth nightly as needed for Sleep for up to 180 days. 90 tablet 1    empagliflozin (JARDIANCE) 25 MG tablet Take 25 mg by mouth daily      linaclotide (LINZESS) 145 MCG capsule Take 145 mcg by mouth every morning (before breakfast)      pantoprazole (PROTONIX) 40 MG tablet Take 40 mg by mouth daily       No current facility-administered medications for this visit. Social History     Tobacco Use    Smoking status: Never    Smokeless tobacco: Never   Vaping Use    Vaping Use: Never used   Substance Use Topics    Alcohol use: No    Drug use: No     Types: Prescription, OTC     Review of Systems   Musculoskeletal:  Positive for arthralgias. Neurological:  Positive for numbness. Blood pressure 109/81, pulse 80, temperature 98 °F (36.7 °C), temperature source Temporal, resp. rate 16, height 6' (1.829 m), weight 232 lb 3.2 oz (105.3 kg), SpO2 99 %. Objective:   Physical Exam  Vitals reviewed. Constitutional:       General: He is not in acute distress. Appearance: Normal appearance. Cardiovascular:      Rate and Rhythm: Normal rate and regular rhythm. Heart sounds: No murmur heard. Pulmonary:      Effort: Pulmonary effort is normal.      Breath sounds: Normal breath sounds. Musculoskeletal:      Comments: Overlapping of the great toe on the second toe on both feet but a little bit worse on the left. Does have a hammertoe on the left foot third toe. Also has some thickening of the right great toenail   Neurological:      General: No focal deficit present.       Mental Status: He is alert and oriented to person, place, and time. Assessment / Plan:    Renetta Sow was seen today for foot pain. Diagnoses and all orders for this visit:    Polyneuropathy, unspecified  -     pregabalin (LYRICA) 75 MG capsule; Take 2 capsules by mouth 2 times daily for 180 days. Vitamin D deficiency, unspecified    Type 2 diabetes mellitus with hyperglycemia, without long-term current use of insulin (HCC)    Acquired hypothyroidism    Essential hypertension    Mixed hyperlipidemia    Bilateral foot pain  -     [facility]    Onychomycosis  -     ciclopirox (PENLAC) 8 % solution; Apply topically nightly. Will try Lyrica instead of gabapentin and he will let me know if symptoms do not improve. Refer to orthopedic foot specialist regarding the foot pain. Follow-up and Dispositions    Return in about 6 months (around 4/28/2023), or if symptoms worsen or fail to improve. Dictated using voice recognition software.  Proofread, but unrecognized errors may exist.

## 2022-11-01 ENCOUNTER — TELEPHONE (OUTPATIENT)
Dept: FAMILY MEDICINE CLINIC | Facility: CLINIC | Age: 62
End: 2022-11-01

## 2022-11-02 ENCOUNTER — OFFICE VISIT (OUTPATIENT)
Dept: ORTHOPEDIC SURGERY | Age: 62
End: 2022-11-02

## 2022-11-02 DIAGNOSIS — Z96.612 S/P REVERSE TOTAL SHOULDER ARTHROPLASTY, LEFT: ICD-10-CM

## 2022-11-02 DIAGNOSIS — M19.012 ARTHRITIS OF LEFT SHOULDER REGION: Primary | ICD-10-CM

## 2022-11-02 PROCEDURE — 99024 POSTOP FOLLOW-UP VISIT: CPT | Performed by: ORTHOPAEDIC SURGERY

## 2022-11-02 NOTE — PROGRESS NOTES
Name: Elise Briseno  YOB: 1960  Gender: male  MRN: [id number]    CC:   Chief Complaint   Patient presents with    Follow-up     S/P left TSA DOS 9-23-22   The patient comes in today 5 weeks status post lefy TSA. Left Shoulder Total Arthroplasty - Left  9/23/2022    HPI: The patient is doing well today. Their pain has decreased. They are attending physical therapy and are following the protocol. They feel as if they are progressing as expected. They deny use of narcotic pain medications. Review of Systems  Noncontributory     PE left shoulder: Their wounds are clean, dry, and intact and there is no sign of infection. They are neurovascularly intact. Their deltoid is firing well. Their Passive Shoulder Range of Motion is: Forward elevation: 105  Abduction: 80  External Rotation: 10-15    X-ray: Grashey, outlet and axillary lateral views of the operative left shoulder were obtained and reviewed today in the office. There has been no change in the hardware's alignment and the glenohumeral position is appropriate on all the films. Stemless implant is in place. Everything looks good. AP:     ICD-10-CM    1. Arthritis of left shoulder region  M19.012 XR SHOULDER LEFT (MIN 2 VIEWS)      2. S/P reverse total shoulder arthroplasty, left  Z96.612 XR SHOULDER LEFT (MIN 2 VIEWS)        The patient is doing well status post the above mentioned procedure. They need to continue with Physical Therapy per the protocol. They will follow-up with us in clinic in 4-6 weeks for repeat evaluation. Return in about 2 months (around 1/2/2023).      Evelin Phillips MD  11/02/22

## 2022-11-09 ENCOUNTER — OFFICE VISIT (OUTPATIENT)
Dept: ORTHOPEDIC SURGERY | Age: 62
End: 2022-11-09
Payer: COMMERCIAL

## 2022-11-09 DIAGNOSIS — M79.672 BILATERAL FOOT PAIN: Primary | ICD-10-CM

## 2022-11-09 DIAGNOSIS — M20.11 VALGUS DEFORMITY OF BOTH GREAT TOES: ICD-10-CM

## 2022-11-09 DIAGNOSIS — M79.671 BILATERAL FOOT PAIN: Primary | ICD-10-CM

## 2022-11-09 DIAGNOSIS — M20.41 HAMMER TOES OF BOTH FEET: ICD-10-CM

## 2022-11-09 DIAGNOSIS — M20.42 HAMMER TOES OF BOTH FEET: ICD-10-CM

## 2022-11-09 DIAGNOSIS — M20.12 VALGUS DEFORMITY OF BOTH GREAT TOES: ICD-10-CM

## 2022-11-09 PROCEDURE — 99214 OFFICE O/P EST MOD 30 MIN: CPT | Performed by: ORTHOPAEDIC SURGERY

## 2022-11-09 NOTE — PROGRESS NOTES
Name: Claire Mi  YOB: 1960  Gender: male  MRN: [id number]    Summary:   Bilateral hallux valgus interphalangeus and interdigital hard corn with second hammertoe       CC: Foot Pain (Bilateral foot pain will xray today )       HPI: Claire Mi is a 57 y.o. male who presents with Foot Pain (Bilateral foot pain will xray today )  . This patient presents to the office today with longstanding history of bilateral forefoot pain worse on the left on the right. He tried pads and spacers not much relief in between the first and second toes. He had a longstanding history of flatfeet. History was obtained by Patient     ROS/Meds/PSH/PMH/FH/SH: I personally reviewed the patients standard intake form. Below are the pertinents    Tobacco:  reports that he has never smoked. He has never used smokeless tobacco.  Diabetes: None      Physical Examination:    Exam of the bilateral feet shows hallux valgus interphalangeus bilaterally as well as tender to palpation over the pain in the second toe on the left. There is no evidence of skin breakdown. He has palpable pulses and intact sensation. Imaging:   I independently interpreted XR taken today  Lateral feet XR: AP, Lateral, Oblique views     ICD-10-CM    1. Bilateral foot pain  M79.671 XR Foot Standard Bilateral    M79.672       2. Hammer toes of both feet  M20.41     M20.42       3.  Valgus deformity of both great toes  M20.11     M20.12          Report: AP, lateral, oblique x-ray of the bilateral feet demonstrates hallux valgus interphalangeus    Impression: Hallux valgus interphalangeus   Amber Flores III, MD           Assessment:   Bilateral hallux valgus interphalangeus and second toe exostosis    Treatment Plan:   4 This is a chronic illness/condition with exacerbation and progression  Treatment at this time: Elective major surgery with procedural risk factors  Studies ordered: NO XR needed @ Next Visit    Weight-bearing status: WBAT Return to work/work restrictions: none  No medications given        Left Akin osteotomy, left second toe hemiphalangectomy    Outpatient - 1-1/4 hours, c-arm, sagittal saw, K-wires , Saunders eva and bur    Anesthesia -  Choice       The patient understands the diagnosis of bunions and claw toes, conservative and surgical treatment. R/C outlined including the risk of recurrence, risk of non-healing of skin and bone with/without infection,  potential loss/amputation of a toe(s) secondary to circulation loss, the risk of stiffness in the toes, and the overall risk of swelling that could be prolonged. The patient understands the use of internal and/or external k-wire type fixation and that it may take 6 months to a year before the patient can comfortably get back into regular/dress shoes. Generalized surgical risks and complications were discussed. The patient accepts and would like to proceed with surgery.

## 2022-11-14 ENCOUNTER — PATIENT MESSAGE (OUTPATIENT)
Dept: FAMILY MEDICINE CLINIC | Facility: CLINIC | Age: 62
End: 2022-11-14

## 2022-11-14 NOTE — TELEPHONE ENCOUNTER
From: Ena Begin  To: Dr. Wahl Muniz: 2022 10:02 AM EST  Subject: Testosterone    I need you to call in refill for my testosterone 60mg/ml to [facility]. Thanks,    Brittany Shadow    This is [employer]. Your rx has . We will need a new prescriptionin . Please give us a call at [phone] if you have any questions. Thank you!

## 2022-11-15 NOTE — TELEPHONE ENCOUNTER
Sent in prescription for:     Requested Prescriptions     Signed Prescriptions Disp Refills    UNABLE TO FIND 30 mL 5     Sig: Testosterone 60 mg/ml Apply 1 ml daily     Authorizing Provider: Moriah Treadwell

## 2022-11-16 ENCOUNTER — PATIENT MESSAGE (OUTPATIENT)
Dept: FAMILY MEDICINE CLINIC | Facility: CLINIC | Age: 62
End: 2022-11-16

## 2022-11-16 DIAGNOSIS — G62.9 POLYNEUROPATHY, UNSPECIFIED: ICD-10-CM

## 2022-11-16 RX ORDER — PREGABALIN 150 MG/1
150 CAPSULE ORAL 2 TIMES DAILY
Qty: 180 CAPSULE | Refills: 3 | Status: SHIPPED | OUTPATIENT
Start: 2022-11-16 | End: 2023-11-11

## 2022-11-16 RX ORDER — LISINOPRIL AND HYDROCHLOROTHIAZIDE 12.5; 1 MG/1; MG/1
1 TABLET ORAL DAILY
Qty: 30 TABLET | Refills: 0 | Status: SHIPPED | OUTPATIENT
Start: 2022-11-16

## 2022-11-16 NOTE — TELEPHONE ENCOUNTER
From: Rigo Mcadams  To: Dr. Sterling Urbina: 11/16/2022 7:49 AM EST  Subject: Refills    pregabalin 75 MG capsule  Commonly known as: Lyrica  Learn more  Take 2 capsules by mouth 2 times daily for 180 day    When I had this filled the insurance refused to fill at this dose. They did fill it 90 per month so Maribel been taking 1 am and 2 pm. It seams to be helping but i think change dose to 150mg x2 daily might go by insurance other wise it might require pre-authorization.      Babak Davidson

## 2022-11-17 NOTE — TELEPHONE ENCOUNTER
Sent in prescription for:     Requested Prescriptions     Signed Prescriptions Disp Refills    pregabalin (LYRICA) 150 MG capsule 180 capsule 3     Sig: Take 1 capsule by mouth 2 times daily for 360 days.      Authorizing Provider: Krys Walker

## 2022-12-05 ENCOUNTER — PATIENT MESSAGE (OUTPATIENT)
Dept: FAMILY MEDICINE CLINIC | Facility: CLINIC | Age: 62
End: 2022-12-05

## 2022-12-05 RX ORDER — CEFUROXIME AXETIL 500 MG/1
500 TABLET ORAL 2 TIMES DAILY
Qty: 20 TABLET | Refills: 0 | Status: SHIPPED | OUTPATIENT
Start: 2022-12-05 | End: 2022-12-15

## 2022-12-05 NOTE — TELEPHONE ENCOUNTER
Sent in prescription for:     Requested Prescriptions     Signed Prescriptions Disp Refills    cefUROXime (CEFTIN) 500 MG tablet 20 tablet 0     Sig: Take 1 tablet by mouth 2 times daily for 10 days     Authorizing Provider: Yakelin Gaitan

## 2022-12-05 NOTE — TELEPHONE ENCOUNTER
From: Fanny Mendieta  To: Dr. Derick Panda: 12/5/2022 6:52 AM EST  Subject: Sinus Infection    Starting two days ago with excessive nasal drainage. Now its into my chest with productive cough. No fever noted. OTC decongestants have not helped. Do I need to come in or can you send it the usual for my sinus?      Martha Croft

## 2022-12-18 ENCOUNTER — PATIENT MESSAGE (OUTPATIENT)
Dept: FAMILY MEDICINE CLINIC | Facility: CLINIC | Age: 62
End: 2022-12-18

## 2022-12-18 DIAGNOSIS — F51.04 CHRONIC INSOMNIA: ICD-10-CM

## 2022-12-18 RX ORDER — ZOLPIDEM TARTRATE 12.5 MG/1
12.5 TABLET, FILM COATED, EXTENDED RELEASE ORAL NIGHTLY PRN
Qty: 90 TABLET | Refills: 1 | Status: SHIPPED | OUTPATIENT
Start: 2022-12-18 | End: 2023-06-16

## 2022-12-19 NOTE — TELEPHONE ENCOUNTER
Sent in prescription for:     Requested Prescriptions     Signed Prescriptions Disp Refills    linaclotide (LINZESS) 145 MCG capsule 90 capsule 3     Sig: Take 1 capsule by mouth every morning (before breakfast)     Authorizing Provider: Guru Iqbal

## 2022-12-19 NOTE — TELEPHONE ENCOUNTER
From: Cherelle Rios  To: Dr. Aidan Villar: 12/18/2022 1:15 PM EST  Subject: Refill    I need a refill on my Linzes 145mg. I tried to do it on other link but said i cant at this time. Not sure why.      Yisel Aram

## 2023-01-03 NOTE — PROGRESS NOTES
Name: Lucinda Michael  YOB: 1960  Gender: male  MRN: [id number]    CC:   Chief Complaint   Patient presents with    Shoulder Pain     Recheck s/p left TSA - DOS 9/23/22   , The patient follows up 12 weeks status post left  TSA. Left Shoulder Total Arthroplasty - Left  9/23/2022    HPI: The patient is doing well. They feel as if their pain has decreased. Has not been back to therapy in a while. Did a lot of therapy on his own.   Is happy with where he is    No Known Allergies  Past Medical History:   Diagnosis Date    Elevated triglycerides with high cholesterol 4/16/2013    GERD (gastroesophageal reflux disease)     managed with medication     History of colon polyps 4/16/2013    8/10    Hypertension     medication    IBS (irritable bowel syndrome)     Insomnia     controlled with  Ambien    Knee pain, bilateral     Low testosterone 7/5/2017    Shoulder pain, bilateral     cortisone injections     Tendonitis     left shoulder    Testosterone deficiency     using testosterone gel    Type 2 diabetes mellitus ([address]     pt states pre-diabetic, FBS , A1c 5.7 (3/4/22), denies hypoglycemia    Unspecified sleep apnea 2011    pt says this is questionable, pt is not using CPAP (noted 2/23/22)     Vertigo 2021    hx      Past Surgical History:   Procedure Laterality Date    CATARACT REMOVAL Bilateral     IOL     CATARACT REMOVAL Bilateral 02/2022    CHOLECYSTECTOMY, LAPAROSCOPIC  05/2021    COLONOSCOPY  10/20/2021    GI  2001, 2010    colonoscopy    ORTHOPEDIC SURGERY  1970's    tendon graft right hand    ORTHOPEDIC SURGERY Right 03/04/2022    R shoulder replacement    OTHER SURGICAL HISTORY      fatty tumor back of neck    SHOULDER ARTHROPLASTY Left 9/23/2022    LEFT SHOULDER TOTAL ARTHROPLASTY performed by Tamika Estrada MD at [facility]     Family History   Problem Relation Age of Onset    Diabetes Mother     Hypertension Mother     Other Mother         severe post op N&V x1 after hip surgery Heart Disease Mother     Hypertension Sister     Cancer Father         sinus cavity tumor    Heart Disease Father         congestive heart failure    Diabetes Brother      Social History     Socioeconomic History    Marital status:      Spouse name: Not on file    Number of children: Not on file    Years of education: Not on file    Highest education level: Not on file   Occupational History    Not on file   Tobacco Use    Smoking status: Never    Smokeless tobacco: Never   Vaping Use    Vaping Use: Never used   Substance and Sexual Activity    Alcohol use: No    Drug use: No     Types: Prescription, OTC    Sexual activity: Not on file   Other Topics Concern    Not on file   Social History Narrative    Not on file     Social Determinants of Health     Financial Resource Strain: Not on file   Food Insecurity: Not on file   Transportation Needs: Not on file   Physical Activity: Not on file   Stress: Not on file   Social Connections: Not on file   Intimate Partner Violence: Not on file   Housing Stability: Not on file        No flowsheet data found. Review of Systems  Noncontributory     PE left shoulder:  General: Alert and Oriented x3 and appears to be of stated age. Head and Face: Normocephalic, atraumatic. Neck: Supple, normal range of motion. Lungs: Normal Respiratory rate. No dyspnea. Skin: No rash or erythema. Skin is cool to the touch. Surgical incisions appear to be healing well and there is no sign of infection. Psychiatric: Normal mood and affect. Answers questions appropriately. Musculoskeletal: The patient's Range of Motion in their operative shoulder today was measured at: Forward Elevation of 110. Abduction of 90. External Rotation of 15  Strength is appropriate. .  The swelling is minimal. They are neurovascularly intact. A/P:     ICD-10-CM    1. Status post total shoulder arthroplasty, left  Z96.612       2.  Status post total shoulder arthroplasty, right  Z96.611         The patient is doing well status post the above mentioned procedure. If they have any questions or concerns the patient knows that they can call our office at any time. Recommend therapy to evaluate and treat current complaints and pathology. A home exercise program was also discussed with the patient. We reviewed multiple stretching exercises today including table slides, chair slides, PVC pipe stretching, etc.    Return in about 2 months (around 3/4/2023).      Romero Pastrana MD  01/04/23

## 2023-01-04 ENCOUNTER — OFFICE VISIT (OUTPATIENT)
Dept: ORTHOPEDIC SURGERY | Age: 63
End: 2023-01-04
Payer: COMMERCIAL

## 2023-01-04 DIAGNOSIS — Z96.612 STATUS POST TOTAL SHOULDER ARTHROPLASTY, LEFT: Primary | ICD-10-CM

## 2023-01-04 DIAGNOSIS — Z96.611 STATUS POST TOTAL SHOULDER ARTHROPLASTY, RIGHT: ICD-10-CM

## 2023-01-04 PROCEDURE — G8417 CALC BMI ABV UP PARAM F/U: HCPCS | Performed by: ORTHOPAEDIC SURGERY

## 2023-01-04 PROCEDURE — G8427 DOCREV CUR MEDS BY ELIG CLIN: HCPCS | Performed by: ORTHOPAEDIC SURGERY

## 2023-01-04 PROCEDURE — 99213 OFFICE O/P EST LOW 20 MIN: CPT | Performed by: ORTHOPAEDIC SURGERY

## 2023-01-04 PROCEDURE — G8484 FLU IMMUNIZE NO ADMIN: HCPCS | Performed by: ORTHOPAEDIC SURGERY

## 2023-01-04 PROCEDURE — 1036F TOBACCO NON-USER: CPT | Performed by: ORTHOPAEDIC SURGERY

## 2023-01-04 PROCEDURE — 3017F COLORECTAL CA SCREEN DOC REV: CPT | Performed by: ORTHOPAEDIC SURGERY

## 2023-01-10 ENCOUNTER — TELEMEDICINE (OUTPATIENT)
Dept: FAMILY MEDICINE CLINIC | Facility: CLINIC | Age: 63
End: 2023-01-10
Payer: COMMERCIAL

## 2023-01-10 DIAGNOSIS — M54.41 ACUTE BILATERAL LOW BACK PAIN WITH BILATERAL SCIATICA: Primary | ICD-10-CM

## 2023-01-10 DIAGNOSIS — E11.65 TYPE 2 DIABETES MELLITUS WITH HYPERGLYCEMIA, WITHOUT LONG-TERM CURRENT USE OF INSULIN (HCC): ICD-10-CM

## 2023-01-10 DIAGNOSIS — M54.42 ACUTE BILATERAL LOW BACK PAIN WITH BILATERAL SCIATICA: Primary | ICD-10-CM

## 2023-01-10 DIAGNOSIS — G62.9 POLYNEUROPATHY, UNSPECIFIED: ICD-10-CM

## 2023-01-10 PROCEDURE — 99441 PR PHYS/QHP TELEPHONE EVALUATION 5-10 MIN: CPT | Performed by: FAMILY MEDICINE

## 2023-01-10 RX ORDER — TIZANIDINE 4 MG/1
4 TABLET ORAL 3 TIMES DAILY PRN
Qty: 21 TABLET | Refills: 0 | Status: SHIPPED | OUTPATIENT
Start: 2023-01-10

## 2023-01-10 RX ORDER — PREGABALIN 150 MG/1
150 CAPSULE ORAL 3 TIMES DAILY
Qty: 270 CAPSULE | Refills: 3 | Status: SHIPPED | OUTPATIENT
Start: 2023-01-10 | End: 2024-01-05

## 2023-01-10 RX ORDER — METHYLPREDNISOLONE 4 MG/1
TABLET ORAL
Qty: 1 KIT | Refills: 0 | Status: SHIPPED | OUTPATIENT
Start: 2023-01-10

## 2023-01-10 NOTE — PROGRESS NOTES
HISTORY OF PRESENT ILLNESS      Consent: Miriam Giordano, who was seen by synchronous (real-time) audio-video technology, and/or his healthcare decision maker, is aware that this patient-initiated, Telehealth encounter on 1/10/2023 is a billable service, with coverage as determined by his insurance carrier. He is aware that he may receive a bill and has provided verbal consent to proceed: Yes. Miriam Giordano is a 60 y.o. male who presents for       HPI  Did a virtual visit by telephone with the patient as she has had a 2-week history of some worsening pain radiating into his buttocks and down his lateral posterior legs consistent with sciatica. Patient has had an ongoing issue with neuropathy and had an MRI done in 2019 that showed some bulging disc. His blood sugars have been doing well. He is taking Lyrica 150 mg twice daily but even prior to the development of sciatica symptoms felt like he was having some persistent neuropathy symptoms. He is tolerating the Lyrica well. Past Medical History:   Diagnosis Date    Elevated triglycerides with high cholesterol 4/16/2013    GERD (gastroesophageal reflux disease)     managed with medication     History of colon polyps 4/16/2013    8/10    Hypertension     medication    IBS (irritable bowel syndrome)     Insomnia     controlled with  Ambien    Knee pain, bilateral     Low testosterone 7/5/2017    Shoulder pain, bilateral     cortisone injections     Tendonitis     left shoulder    Testosterone deficiency     using testosterone gel    Type 2 diabetes mellitus ([facility] [address] [facility]     pt states pre-diabetic, FBS , A1c 5.7 (3/4/22), denies hypoglycemia    Unspecified sleep apnea 2011    pt says this is questionable, pt is not using CPAP (noted 2/23/22)     Vertigo 2021    hx        No Known Allergies    Current Outpatient Medications   Medication Sig Dispense Refill    pregabalin (LYRICA) 150 MG capsule Take 1 capsule by mouth 3 times daily for 360 days.  Max Daily Amount: 450 mg 270 capsule 3    methylPREDNISolone (MEDROL, BALTAZAR,) 4 MG tablet Take by mouth. 1 kit 0    tiZANidine (ZANAFLEX) 4 MG tablet Take 1 tablet by mouth 3 times daily as needed (pain) 21 tablet 0    zolpidem (AMBIEN CR) 12.5 MG extended release tablet Take 1 tablet by mouth nightly as needed for Sleep for up to 180 days. 90 tablet 1    linaclotide (LINZESS) 145 MCG capsule Take 1 capsule by mouth every morning (before breakfast) 90 capsule 3    lisinopril-hydroCHLOROthiazide (PRINZIDE;ZESTORETIC) 10-12.5 MG per tablet Take 1 tablet by mouth daily 30 tablet 0    UNABLE TO FIND Testosterone 60 mg/ml Apply 1 ml daily 30 mL 5    NONFORMULARY Testosterone compound cream 60 gram/ml apply daily      JANUVIA 100 MG tablet Take 1 tablet by mouth once daily (Patient taking differently: Take by mouth at bedtime) 90 tablet 1    empagliflozin (JARDIANCE) 25 MG tablet Take 25 mg by mouth daily      pantoprazole (PROTONIX) 40 MG tablet Take 40 mg by mouth daily       No current facility-administered medications for this visit. Social History     Tobacco Use    Smoking status: Never    Smokeless tobacco: Never   Vaping Use    Vaping Use: Never used   Substance Use Topics    Alcohol use: No    Drug use: No     Types: Prescription, OTC       Negative except numbness and discomfort in both legs, pain now radiating from his lower back into his buttocks along the sciatic nerve distribution. There were no vitals taken for this visit. Physical Exam     ASSESSMENT and Brittanie Law was seen today for back pain. Diagnoses and all orders for this visit:    Acute bilateral low back pain with bilateral sciatica  -     methylPREDNISolone (MEDROL, BALTAZAR,) 4 MG tablet; Take by mouth. -     tiZANidine (ZANAFLEX) 4 MG tablet; Take 1 tablet by mouth 3 times daily as needed (pain)    Polyneuropathy, unspecified  -     pregabalin (LYRICA) 150 MG capsule; Take 1 capsule by mouth 3 times daily for 360 days.  Max Daily Amount: 450 mg    Type 2 diabetes mellitus with hyperglycemia, without long-term current use of insulin (HCC)  We will try Medrol Dosepak and a muscle relaxer but can increase the Lyrica to taking 1 in the morning and 2 at night to see if that works better for neuropathy. Did warn the patient about potential increase in blood sugars due to the steroids. Follow-up and Dispositions    Return if symptoms worsen or fail to improve. This virtual visit was conducted  audio only    Total Time: minutes: 5-10 minutes. Sal Garcia was evaluated through a synchronous (real-time) audio-video encounter. The patient (or guardian if applicable) is aware that this is a billable service, which includes applicable co-pays. This Virtual Visit was conducted with patient's (and/or legal guardian's) consent. The visit was conducted pursuant to the emergency declaration under the [address] [place] [address], [address] [place] [address] authority and the [employer] and [employer] General Act. Patient identification was verified, and a caregiver was present when appropriate. The patient was located at Home: [address] [place] [address],[address] [place] [address]. Provider was located at [facility] ([address] [place], [place] [address]t): Abbie [address] [place] [address]. Wilfred Rodrigez MD  01/10/23    Dictated using voice recognition software.  Proofread, but unrecognized errors may exist.

## 2023-01-23 ENCOUNTER — TELEPHONE (OUTPATIENT)
Dept: FAMILY MEDICINE CLINIC | Facility: CLINIC | Age: 63
End: 2023-01-23

## 2023-01-23 ENCOUNTER — PATIENT MESSAGE (OUTPATIENT)
Dept: FAMILY MEDICINE CLINIC | Facility: CLINIC | Age: 63
End: 2023-01-23

## 2023-01-23 DIAGNOSIS — E11.65 TYPE 2 DIABETES MELLITUS WITH HYPERGLYCEMIA, WITHOUT LONG-TERM CURRENT USE OF INSULIN (HCC): Primary | ICD-10-CM

## 2023-01-23 RX ORDER — LISINOPRIL AND HYDROCHLOROTHIAZIDE 12.5; 1 MG/1; MG/1
1 TABLET ORAL DAILY
Qty: 90 TABLET | Refills: 3 | Status: SHIPPED | OUTPATIENT
Start: 2023-01-23

## 2023-01-23 RX ORDER — PANTOPRAZOLE SODIUM 40 MG/1
40 TABLET, DELAYED RELEASE ORAL DAILY
Qty: 90 TABLET | Refills: 3 | Status: SHIPPED | OUTPATIENT
Start: 2023-01-23

## 2023-01-23 NOTE — TELEPHONE ENCOUNTER
Sent in prescription for:     Requested Prescriptions     Signed Prescriptions Disp Refills    SITagliptin (JANUVIA) 100 MG tablet 90 tablet 3     Sig: Take 1 tablet by mouth daily Take 1 tablet by mouth once daily     Authorizing Provider: Domingo CORTES    empagliflozin (JARDIANCE) 25 MG tablet 90 tablet 3     Sig: Take 1 tablet by mouth daily     Authorizing Provider: Domingo CORTES    pantoprazole (PROTONIX) 40 MG tablet 90 tablet 3     Sig: Take 1 tablet by mouth daily     Authorizing Provider: Susan Mccarty    lisinopril-hydroCHLOROthiazide (PRINZIDE;ZESTORETIC) 10-12.5 MG per tablet 90 tablet 3     Sig: Take 1 tablet by mouth daily     Authorizing Provider: Susan Mccarty    linaclotide (LINZESS) 145 MCG capsule 90 capsule 3     Sig: Take 1 capsule by mouth every morning (before breakfast)     Authorizing Provider: Susan Mccarty         Can we see if we received a prior authorization request for the [facility]

## 2023-01-23 NOTE — TELEPHONE ENCOUNTER
----- Message from [facility]. Oleg Humphrey sent at 2023 12:36 PM EST -----  Regarding: New Insurance  I ran into my first problem with changing insurance. Tries to refill my Januvia 100 and they said it would need preauthorization. They wont me to try one of three other meds. Talked with my HR dept they said try doing it with note as to other meds we have already tried. If that doesn't work then they will try work around.  I have several that  2023 that will need new sent in.     Januvia 100mg  Jardiance 25mg  Linzess 145mcg  Pantoprazole 40mg   Lisinopril/HCTZ n10-12.5mg

## 2023-01-24 NOTE — TELEPHONE ENCOUNTER
From: Alberto Burnett  To: Dr. Collette Connor: 2023 12:36 PM EST  Subject: New Insurance    I ran into my first problem with changing insurance. Tries to refill my Januvia 100 and they said it would need preauthorization. They wont me to try one of three other meds. Talked with my HR dept they said try doing it with note as to other meds we have already tried. If that doesn't work then they will try work around.  I have several that  2023 that will need new sent in.     Januvia 100mg  Jardiance 25mg  Linzess 145mcg  Pantoprazole 40mg   Lisinopril/HCTZ n10-12.5mg

## 2023-01-24 NOTE — PROGRESS NOTES
OCCUPATIONAL THERAPY Initial Assessment, Discharge, and AM      (Link to Caseload Tracking: OT Visit Days: 1  OT Orders   Time  OT Charge Capture  Rehab Caseload Tracker  Episode     Nino Viveros is a 57 y.o. male   PRIMARY DIAGNOSIS: Arthritis of left shoulder region  Arthritis of left shoulder region [M19.012]  Procedure(s) (LRB):  LEFT SHOULDER TOTAL ARTHROPLASTY (Left)  1 Day Post-Op  Reason for Referral: Pain in Left Shoulder (M25.512)  Stiffness of Left Shoulder, Not elsewhere classified (M25.612)  Outpatient in a bed: Payor: Eufemia Hannah / Plan: Blanca Horn / Product Type: *No Product type* /     ASSESSMENT:     REHAB RECOMMENDATIONS:   Recommendation to date pending progress:  Setting:  No further skilled therapy after discharge from hospital (OT)    Equipment:    None     ASSESSMENT:  Mr. Eva Cardoso is s/p left TSA and presents with decreased independence with functional mobility and activities of daily living as compared to baseline level of function and safety. Patient would benefit from skilled Occupational Therapy to maximize independence and safety with self-care task and functional mobility. Patient able to complete  lower body dressing and upper body dressing at edge of bed with stand by assist .  Mobilized from hospital bed to recliner/chair using a rolling walker with assist.  Patient met all established OT goals this session. Patient educated on upper body dressing compensatory strategies. All questions/concerns answered. At this time, no further skilled OT services recommended, patient to return home with family assistance as needed.         [employer] AM-PAC 6 Clicks Daily Activity Inpatient Short Form:    AM-PAC Daily Activity Inpatient   How much help for putting on and taking off regular lower body clothing?: None  How much help for Bathing?: A Little  How much help for Toileting?: None  How much help for putting on and taking off regular upper body clothing?: A SBA=Standby Assistance, CGA=Contact Guard Assistance, Min=Minimal Assistance, Mod=Moderate Assistance, Max=Maximal Assistance, Total=Total Assistance, NT=Not Tested    ACTIVITIES OF DAILY LIVING: I Mod I S SBA CGA Min Mod Max Total NT Comments   BASIC ADLs:              Upper Body Bathing [] [] [] [] [] [] [] [] [] [x]    Lower Body Bathing [] [] [] [] [] [] [] [] [] [x]    Toileting [] [] [] [] [] [] [] [] [] [x]    Upper Body Dressing [] [] [] [x] [] [] [] [] [] [] Donned pull over shirt and sling    Lower Body Dressing [] [] [] [x] [] [] [] [] [] [] Donned underwear and elastic waist pants    Feeding [] [] [] [] [] [] [] [] [] []    Grooming [] [] [] [x] [] [] [] [] [] [] Standing    Personal Device Care [] [] [] [] [] [] [] [] [] []    Functional Mobility [] [] [] [x] [] [] [] [] [] [] No AD    I=Independent, Mod I=Modified Independent, S=Supervision/Setup, SBA=Standby Assistance, CGA=Contact Guard Assistance, Min=Minimal Assistance, Mod=Moderate Assistance, Max=Maximal Assistance, Total=Total Assistance, NT=Not Tested    PLAN:     FREQUENCY/DURATION   OT Plan of Care:  (ДМИТРИЙ and MIGUEL). ACUTE OCCUPATIONAL THERAPY GOALS:   (Developed with and agreed upon by patient and/or caregiver.)    GOALS:   DISCHARGE GOALS (in preparation for going home/rehab):  3 days  1. Mr. Kit Cevallos will perform lower body dressing activity with stand by assist required to demonstrate improved functional mobility and safety. -GOAL MET 9/24/22    2. Mr. Kit Cevallos will perform upper body dressing activity with stand by assist required to demonstrate improved functional mobility and safety. -GOAL MET 9/24/22    4. Mr. Kit Cevallos will perform all functional transfers transfer with stand by assist to demonstrate improved functional mobility and safety.  -GOAL MET 9/24/22        PROBLEM LIST:   (Skilled intervention is medically necessary to address:)  N/A   INTERVENTIONS PLANNED:   (Benefits and precautions of occupational therapy have been discussed with the patient.)  N/A         TREATMENT:     EVALUATION: LOW COMPLEXITY: (Untimed Charge)    TREATMENT:   Self Care (10 minutes): Patient participated in upper body dressing and lower body dressing ADLs in unsupported sitting and standing with minimal visual and verbal cueing to increase independence. Patient also participated in functional mobility and functional transfer training to increase independence, increase activity tolerance, and increase safety awareness.      AFTER TREATMENT PRECAUTIONS: Bed/Chair Locked, Call light within reach, Chair, Heels floated, and Needs within reach    INTERDISCIPLINARY COLLABORATION:  PT/ PTA and OT/ EDMONDS    EDUCATION:     [] Safe And Effective Hygiene  [x] Fall Precautions  [] Hip Precautions  [] D/C Instruction Review [] Prosthesis Review  [] Walker Management/Safety  [x] Adaptive Equipment as Needed  [x] Therapeutic Resting Position of Joint       TOTAL TREATMENT DURATION AND TIME:  Time In: 0806  Time Out: 4893  Minutes: [address] [facility],[address], OT no

## 2023-01-24 NOTE — TELEPHONE ENCOUNTER
Sent in prescription for:     Requested Prescriptions     Signed Prescriptions Disp Refills    linagliptin (TRADJENTA) 5 MG tablet 90 tablet 1     Sig: Take 1 tablet by mouth daily     Authorizing Provider: Alfa Garcia

## 2023-01-26 ENCOUNTER — TELEPHONE (OUTPATIENT)
Dept: FAMILY MEDICINE CLINIC | Facility: CLINIC | Age: 63
End: 2023-01-26

## 2023-02-03 ENCOUNTER — PATIENT MESSAGE (OUTPATIENT)
Dept: FAMILY MEDICINE CLINIC | Facility: CLINIC | Age: 63
End: 2023-02-03

## 2023-02-03 DIAGNOSIS — M51.26 HNP (HERNIATED NUCLEUS PULPOSUS), LUMBAR: ICD-10-CM

## 2023-02-03 DIAGNOSIS — M54.41 ACUTE BILATERAL LOW BACK PAIN WITH BILATERAL SCIATICA: Primary | ICD-10-CM

## 2023-02-03 DIAGNOSIS — M54.42 ACUTE BILATERAL LOW BACK PAIN WITH BILATERAL SCIATICA: Primary | ICD-10-CM

## 2023-02-03 NOTE — TELEPHONE ENCOUNTER
From: Sarah Perdue  To: Dr. Darling Joshi: 2/3/2023 10:52 AM EST  Subject: Back pain    First the sciatic nerve pain got a little better but it didnt last. Im having back pain in area of lower lumbar. What should we do next? If you wont to do new MRI to see whats changed i would prefer to go to [facility] its cheeper and thats where I went last time. I dont wont to go back to last Doctor that to me to live with it. Let me know what to do. No rush im dealing with it but its getting worse.      Beth Gallegos

## 2023-02-06 ENCOUNTER — CLINICAL DOCUMENTATION (OUTPATIENT)
Dept: FAMILY MEDICINE CLINIC | Facility: CLINIC | Age: 63
End: 2023-02-06

## 2023-02-06 NOTE — PROGRESS NOTES
Order for MRI Lumbar spine faxed to [facility] for patient to be scheduled. Confirmation received.

## 2023-02-09 ENCOUNTER — CLINICAL DOCUMENTATION (OUTPATIENT)
Dept: FAMILY MEDICINE CLINIC | Facility: CLINIC | Age: 63
End: 2023-02-09

## 2023-02-09 NOTE — PROGRESS NOTES
Faxed order for MRI lumbar spine to [facility] for patient to be scheduled. Confirmation received.

## 2023-02-10 ENCOUNTER — PATIENT MESSAGE (OUTPATIENT)
Dept: FAMILY MEDICINE CLINIC | Facility: CLINIC | Age: 63
End: 2023-02-10

## 2023-02-10 DIAGNOSIS — M48.062 SPINAL STENOSIS OF LUMBAR REGION WITH NEUROGENIC CLAUDICATION: Primary | ICD-10-CM

## 2023-02-10 NOTE — TELEPHONE ENCOUNTER
From: Xiang Messer  To: Dr. Mina Adams: 2/10/2023 7:49 AM EST  Subject: Question regarding MRI Lumbar Spine Without Contrast    I dont want to see him wasn't impressed the last time. What about Dr Awa Murray he fixed my mom's Sciatica and she was very happy. See if you can get him to see me.      Demar Moss

## 2023-02-14 ENCOUNTER — OFFICE VISIT (OUTPATIENT)
Dept: NEUROSURGERY | Age: 63
End: 2023-02-14
Payer: COMMERCIAL

## 2023-02-14 VITALS
WEIGHT: 237 LBS | BODY MASS INDEX: 32.1 KG/M2 | HEIGHT: 72 IN | DIASTOLIC BLOOD PRESSURE: 60 MMHG | HEART RATE: 62 BPM | TEMPERATURE: 97.7 F | OXYGEN SATURATION: 97 % | SYSTOLIC BLOOD PRESSURE: 118 MMHG

## 2023-02-14 DIAGNOSIS — M43.16 SPONDYLOLISTHESIS AT L4-L5 LEVEL: ICD-10-CM

## 2023-02-14 DIAGNOSIS — M54.32 BILATERAL SCIATICA: Primary | ICD-10-CM

## 2023-02-14 DIAGNOSIS — M54.31 BILATERAL SCIATICA: Primary | ICD-10-CM

## 2023-02-14 DIAGNOSIS — M48.061 SPINAL STENOSIS OF LUMBAR REGION WITHOUT NEUROGENIC CLAUDICATION: ICD-10-CM

## 2023-02-14 PROCEDURE — 1036F TOBACCO NON-USER: CPT | Performed by: NEUROLOGICAL SURGERY

## 2023-02-14 PROCEDURE — 3017F COLORECTAL CA SCREEN DOC REV: CPT | Performed by: NEUROLOGICAL SURGERY

## 2023-02-14 PROCEDURE — G8417 CALC BMI ABV UP PARAM F/U: HCPCS | Performed by: NEUROLOGICAL SURGERY

## 2023-02-14 PROCEDURE — 99204 OFFICE O/P NEW MOD 45 MIN: CPT | Performed by: NEUROLOGICAL SURGERY

## 2023-02-14 PROCEDURE — G8484 FLU IMMUNIZE NO ADMIN: HCPCS | Performed by: NEUROLOGICAL SURGERY

## 2023-02-14 PROCEDURE — G8427 DOCREV CUR MEDS BY ELIG CLIN: HCPCS | Performed by: NEUROLOGICAL SURGERY

## 2023-02-14 NOTE — PROGRESS NOTES
History of Present Illness    Patient Words: 60 y.o. This patient is a 60 y.o. male who presents today for evaluation of a greater than 12-month history of low back and buttock pain upon arising in the morning and after he sits for a while. He gets up from bed in the middle the night he has this burning pain traveling into the buttocks and posterior thighs but not below. He does take Lyrica for peripheral neuropathy. MRI scan lumbar spine shows grade 1 spondylolisthesis and spinal stenosis at L4-5 however he has no history of claudication whatsoever. Furthermore activities do not exacerbate this buttock pain.     Past Medical History:   Diagnosis Date    Elevated triglycerides with high cholesterol 4/16/2013    GERD (gastroesophageal reflux disease)     managed with medication     History of colon polyps 4/16/2013    8/10    Hypertension     medication    IBS (irritable bowel syndrome)     Insomnia     controlled with  Ambien    Knee pain, bilateral     Low testosterone 7/5/2017    Shoulder pain, bilateral     cortisone injections     Tendonitis     left shoulder    Testosterone deficiency     using testosterone gel    Type 2 diabetes mellitus ([address]     pt states pre-diabetic, FBS , A1c 5.7 (3/4/22), denies hypoglycemia    Unspecified sleep apnea 2011    pt says this is questionable, pt is not using CPAP (noted 2/23/22)     Vertigo 2021    hx         No Known Allergies     Family History   Problem Relation Age of Onset    Diabetes Mother     Hypertension Mother     Other Mother         severe post op N&V x1 after hip surgery    Heart Disease Mother     Hypertension Sister     Cancer Father         sinus cavity tumor    Heart Disease Father         congestive heart failure    Diabetes Brother         Social History     Socioeconomic History    Marital status:      Spouse name: Not on file    Number of children: Not on file    Years of education: Not on file    Highest education level: Not on file Occupational History    Not on file   Tobacco Use    Smoking status: Never    Smokeless tobacco: Never   Vaping Use    Vaping Use: Never used   Substance and Sexual Activity    Alcohol use: No    Drug use: No     Types: Prescription, OTC    Sexual activity: Not on file   Other Topics Concern    Not on file   Social History Narrative    Not on file     Social Determinants of Health     Financial Resource Strain: Not on file   Food Insecurity: Not on file   Transportation Needs: Not on file   Physical Activity: Not on file   Stress: Not on file   Social Connections: Not on file   Intimate Partner Violence: Not on file   Housing Stability: Not on file       Current Outpatient Medications   Medication Sig Dispense Refill    lisinopril-hydroCHLOROthiazide (PRINZIDE;ZESTORETIC) 10-12.5 MG per tablet Take 1 tablet by mouth once daily 90 tablet 3    linagliptin (TRADJENTA) 5 MG tablet Take 1 tablet by mouth daily 90 tablet 1    SITagliptin (JANUVIA) 100 MG tablet Take 1 tablet by mouth daily Take 1 tablet by mouth once daily 90 tablet 3    pantoprazole (PROTONIX) 40 MG tablet Take 1 tablet by mouth daily 90 tablet 3    linaclotide (LINZESS) 145 MCG capsule Take 1 capsule by mouth every morning (before breakfast) 90 capsule 3    pregabalin (LYRICA) 150 MG capsule Take 1 capsule by mouth 3 times daily for 360 days. Max Daily Amount: 450 mg 270 capsule 3    tiZANidine (ZANAFLEX) 4 MG tablet Take 1 tablet by mouth 3 times daily as needed (pain) 21 tablet 0    zolpidem (AMBIEN CR) 12.5 MG extended release tablet Take 1 tablet by mouth nightly as needed for Sleep for up to 180 days. 90 tablet 1    NONFORMULARY Testosterone compound cream 60 gram/ml apply daily      empagliflozin (JARDIANCE) 25 MG tablet Take 1 tablet by mouth daily 90 tablet 3    UNABLE TO FIND Testosterone 60 mg/ml Apply 1 ml daily 30 mL 5     No current facility-administered medications for this visit.        Patient Active Problem List   Diagnosis    Shoulder pain, bilateral    Knee pain, bilateral    Neuropathic pain    Arthritis of right shoulder region    Cold hand without peripheral vascular disease    IBS (irritable bowel syndrome)    Paresthesia of both feet    Low testosterone    Gastroesophageal reflux disease without esophagitis    Localized osteoarthritis of right shoulder    Precordial pain    History of right shoulder replacement    Abnormal clinical finding    History of colon polyps    Elevated triglycerides with high cholesterol    Encounter for medication management    Dysesthesia    Calculus of gallbladder without cholecystitis without obstruction    Elevated hemoglobin A1c    Type 2 diabetes mellitus with hyperglycemia, without long-term current use of insulin (HCC)    Allergic rhinitis    Chronic bilateral low back pain with right-sided sciatica    DDD (degenerative disc disease), lumbar    Severe obesity with body mass index (BMI) of 35.0 to 39.9 with serious comorbidity (HCC)    Facet arthropathy, lumbar    Spondylosis of lumbar region without myelopathy or radiculopathy    Arthritis of left shoulder region    Bilateral sciatica    Spondylolisthesis at L4-L5 level    Spinal stenosis of lumbar region without neurogenic claudication        Review of Systems: A complete ROS was done and as stated in the HPI or otherwise negative. /60 (Site: Left Upper Arm)   Pulse 62   Temp 97.7 °F (36.5 °C) (Temporal)   Ht 6' (1.829 m)   Wt 237 lb (107.5 kg)   SpO2 97%   BMI 32.14 kg/m²        Physical Exam  The physical exam findings are as follows:      Cranial Nerves:   Intact visual fields. Fundi are benign. TOMEKA, EOM's full, no nystagmus, no ptosis. Facial sensation is normal. Corneal reflexes are intact. Facial movement is symmetric. Hearing is normal bilaterally. Palate is midline with normal sternocleidomastoid and trapezius muscles are normal. Tongue is midline. Motor:  5/5 strength in upper and lower proximal and distal muscles.  Normal bulk and tone. No fasciculations. Reflexes:   Deep tendon reflexes 2+/4 and symmetrical.   Sensory:   Normal to touch, pinprick and vibration. Gait:  Normal gait. Tremor:   No tremor noted. Cerebellar:  No cerebellar signs present. Assessment & Plan      ICD-10-CM    1. Bilateral sciatica  M54.31     M54.32       2. Spondylolisthesis at L4-L5 level  M43.16       3. Spinal stenosis of lumbar region without neurogenic claudication  M48.061          At the present time I do not believe the he has a surgical situation. He does not claudicate and has no pain in his back or buttocks when he is up and about only when he lies down and tries to arise from a seated position. Referral to pain management for epidural steroid injections and treatment. Follow-up as needed.     Braxton Guzman MD

## 2023-02-22 ENCOUNTER — TELEPHONE (OUTPATIENT)
Dept: FAMILY MEDICINE CLINIC | Facility: CLINIC | Age: 63
End: 2023-02-22

## 2023-02-24 NOTE — TELEPHONE ENCOUNTER
Denied today  Request Reference Number: [id number]. ERICH HOBBS 100MG is denied for not meeting the prior authorization requirement(s).      Appeal submitted 02/24 awaiting determination

## 2023-02-25 ENCOUNTER — PATIENT MESSAGE (OUTPATIENT)
Dept: FAMILY MEDICINE CLINIC | Facility: CLINIC | Age: 63
End: 2023-02-25

## 2023-02-25 DIAGNOSIS — E11.65 TYPE 2 DIABETES MELLITUS WITH HYPERGLYCEMIA, WITHOUT LONG-TERM CURRENT USE OF INSULIN (HCC): Primary | ICD-10-CM

## 2023-02-26 RX ORDER — BLOOD-GLUCOSE SENSOR
EACH MISCELLANEOUS
Qty: 6 EACH | Refills: 12 | Status: SHIPPED | OUTPATIENT
Start: 2023-02-26

## 2023-02-27 NOTE — TELEPHONE ENCOUNTER
From: Miriam Giordano  To: Dr. Jelena Chery: 2/25/2023 11:01 AM EST  Subject: Blood Sugar    Do you know if [facility] will cover FreeStyle Sania BGL sensor for Iphone. I used there older one and loved it but was knocking them off working. Now that Im retired and they make covers to help protect it I would like to try again if not to expensive. They now have FreeStyle Sania 3 but not sure if insurance will cover. Im not sure I can trust the one im using now it keeps show spikes higher that im use to regardless I would like to track what i eat to know whats not good for me. Im not sure that new med is working for me yet. Let me know before I order more strips again.  Thanks    Ashley Seymour

## 2023-03-08 ENCOUNTER — OFFICE VISIT (OUTPATIENT)
Dept: ORTHOPEDIC SURGERY | Age: 63
End: 2023-03-08

## 2023-03-08 DIAGNOSIS — Z96.612 STATUS POST TOTAL SHOULDER ARTHROPLASTY, LEFT: Primary | ICD-10-CM

## 2023-03-08 DIAGNOSIS — Z96.611 STATUS POST TOTAL SHOULDER ARTHROPLASTY, RIGHT: ICD-10-CM

## 2023-03-08 NOTE — PROGRESS NOTES
Name: Bree Montes  YOB: 1960  Gender: male  MRN: [id number]    CC:   Chief Complaint   Patient presents with    Follow-up     S/P left TSA DOS 9-23-22  S/P right TSA DOS 3-4-22   , The patient follows up 1 year(s) status post right TSA and 5 months s/p L TSA. Left Shoulder Total Arthroplasty - Left  9/23/2022    HPI: The patient is doing well. They feel as if they are progressing as they should. They are still using a home exercise program.   Patient states he is feeling great and back to doing normal activities. He notes intermittent soreness over R deltoid but no new or worsening shoulder pain. He is not requiring pain medication or antiinflammatories. Denies numbness or tingling. Pt states he has successfully completed PT and has full ROM in both shoulders.        No Known Allergies  Past Medical History:   Diagnosis Date    Elevated triglycerides with high cholesterol 4/16/2013    GERD (gastroesophageal reflux disease)     managed with medication     History of colon polyps 4/16/2013    8/10    Hypertension     medication    IBS (irritable bowel syndrome)     Insomnia     controlled with  Ambien    Knee pain, bilateral     Low testosterone 7/5/2017    Shoulder pain, bilateral     cortisone injections     Tendonitis     left shoulder    Testosterone deficiency     using testosterone gel    Type 2 diabetes mellitus ([facility] [address]     pt states pre-diabetic, FBS , A1c 5.7 (3/4/22), denies hypoglycemia    Unspecified sleep apnea 2011    pt says this is questionable, pt is not using CPAP (noted 2/23/22)     Vertigo 2021    hx      Past Surgical History:   Procedure Laterality Date    CATARACT REMOVAL Bilateral     IOL     CATARACT REMOVAL Bilateral 02/2022    CHOLECYSTECTOMY, LAPAROSCOPIC  05/2021    COLONOSCOPY  10/20/2021    GI  2001, 2010    colonoscopy    LIPOMA RESECTION N/A     neck    ORTHOPEDIC SURGERY  1970's    tendon graft right hand    ORTHOPEDIC SURGERY Right 03/04/2022    R shoulder replacement    OTHER SURGICAL HISTORY      fatty tumor back of neck    SHOULDER ARTHROPLASTY Left 09/23/2022    LEFT SHOULDER TOTAL ARTHROPLASTY performed by Mo De La Rosa MD at [facility]     Family History   Problem Relation Age of Onset    Diabetes Mother     Hypertension Mother     Other Mother         severe post op N&V x1 after hip surgery    Heart Disease Mother     Hypertension Sister     Cancer Father         sinus cavity tumor    Heart Disease Father         congestive heart failure    Diabetes Brother      Social History     Socioeconomic History    Marital status:      Spouse name: Not on file    Number of children: Not on file    Years of education: Not on file    Highest education level: Not on file   Occupational History    Not on file   Tobacco Use    Smoking status: Never    Smokeless tobacco: Never   Vaping Use    Vaping Use: Never used   Substance and Sexual Activity    Alcohol use: No    Drug use: No     Types: Prescription, OTC    Sexual activity: Not on file   Other Topics Concern    Not on file   Social History Narrative    Not on file     Social Determinants of Health     Financial Resource Strain: Not on file   Food Insecurity: Not on file   Transportation Needs: Not on file   Physical Activity: Not on file   Stress: Not on file   Social Connections: Not on file   Intimate Partner Violence: Not on file   Housing Stability: Not on file        No flowsheet data found. Review of Systems  Noncontributory     PE:  General: Alert and Oriented x3 and appears to be of stated age. Head and Face: Normocephalic, atraumatic. Neck: Supple, normal range of motion. Lungs: Normal Respiratory rate. No dyspnea. Skin: No rash or erythema. Skin is cool to the touch. Surgical incisions appear to be healing well and there is no sign of infection. Psychiatric: Normal mood and affect. Answers questions appropriately.     Musculoskeletal: The patient's Range of Motion today was measured at: Left  Forward Elevation of 150. Abduction of 110. External Rotation of 30. The patient's strength today is:  External Rotation: 5. Abduction: 5.  Belly Press Test: 5    On the right has flexion of 155 abduction 115 external rotation 30 normal strength throughout  The swelling is minimal. They are neurovascularly intact. X-rayTrinda Barbadian and axillary lateral views of the operative bilateral shoulder were obtained and reviewed today in the office. There has been no change in the hardware's alignment and the glenohumeral position is appropriate on all the films. Bilateral stemless implants. AP     ICD-10-CM    1. Status post total shoulder arthroplasty, left  Z96.612 XR SHOULDER BILATERAL STANDARD     CANCELED: XR SHOULDER LEFT (MIN 2 VIEWS)      2. Status post total shoulder arthroplasty, right  Z96.611 XR SHOULDER BILATERAL STANDARD     CANCELED: XR SHOULDER RIGHT (MIN 2 VIEWS)        The patient is doing well status post the above mentioned procedure. They need to continue with their exercises. If they have any questions or concerns the patient knows that they can call our office at any time. Return in about 6 months (around 9/8/2023) for TSA x-ray fu Grashey / Axillary of the left shoulder only.      Lucas Plaza MD  03/08/23

## 2023-03-27 ENCOUNTER — TELEPHONE (OUTPATIENT)
Dept: FAMILY MEDICINE CLINIC | Facility: CLINIC | Age: 63
End: 2023-03-27

## 2023-03-27 NOTE — LETTER
April 4, 2023       Darren Giron YOB: 1960   [address] [address] Date of Visit:  3/27/2023       To Whom It May Concern:        Sincerely,        Nida Benavidez MD

## 2023-03-28 NOTE — TELEPHONE ENCOUNTER
Deniedon March 27  Request Reference Number: [id number]. ERICH TAB 100MG is denied for not meeting the prior authorization requirement(s). Details of this decision are in the notice attached below or have been faxed to you.       Please let me know if an appeal is needed if so please write a letter of appeals

## 2023-04-04 NOTE — TELEPHONE ENCOUNTER
Looks like the patient had taken Mg Cooper at 1 point in the past, could we see if he has been able to stay on that as an alternative to [facility] or if he is having difficulty getting that approved as well I can send in the appeal letter.

## 2023-04-05 NOTE — TELEPHONE ENCOUNTER
Pt stating there was confusion with the meds. He by mistake ordered the Januvia. Pt is presently taking Tradjenta and Jardiance. Blood sugars are .   Informed Pt to remain on current meds and Pt has a follow up appt 05/01/23 for physical.  Pt has Rx's for meds with refills

## 2023-05-01 ENCOUNTER — OFFICE VISIT (OUTPATIENT)
Dept: FAMILY MEDICINE CLINIC | Facility: CLINIC | Age: 63
End: 2023-05-01
Payer: COMMERCIAL

## 2023-05-01 ENCOUNTER — NURSE ONLY (OUTPATIENT)
Dept: FAMILY MEDICINE CLINIC | Facility: CLINIC | Age: 63
End: 2023-05-01

## 2023-05-01 VITALS
BODY MASS INDEX: 31.02 KG/M2 | HEART RATE: 52 BPM | OXYGEN SATURATION: 97 % | WEIGHT: 229 LBS | TEMPERATURE: 97.9 F | HEIGHT: 72 IN | RESPIRATION RATE: 16 BRPM | SYSTOLIC BLOOD PRESSURE: 106 MMHG | DIASTOLIC BLOOD PRESSURE: 72 MMHG

## 2023-05-01 DIAGNOSIS — R79.89 LOW TESTOSTERONE: ICD-10-CM

## 2023-05-01 DIAGNOSIS — E55.9 VITAMIN D DEFICIENCY, UNSPECIFIED: ICD-10-CM

## 2023-05-01 DIAGNOSIS — E11.65 TYPE 2 DIABETES MELLITUS WITH HYPERGLYCEMIA, WITHOUT LONG-TERM CURRENT USE OF INSULIN (HCC): ICD-10-CM

## 2023-05-01 DIAGNOSIS — E78.2 ELEVATED TRIGLYCERIDES WITH HIGH CHOLESTEROL: ICD-10-CM

## 2023-05-01 DIAGNOSIS — F51.04 CHRONIC INSOMNIA: ICD-10-CM

## 2023-05-01 DIAGNOSIS — R73.09 ELEVATED HEMOGLOBIN A1C: ICD-10-CM

## 2023-05-01 DIAGNOSIS — E53.8 DEFICIENCY OF OTHER SPECIFIED B GROUP VITAMINS: ICD-10-CM

## 2023-05-01 DIAGNOSIS — Z12.5 SCREENING PSA (PROSTATE SPECIFIC ANTIGEN): ICD-10-CM

## 2023-05-01 DIAGNOSIS — E03.9 ACQUIRED HYPOTHYROIDISM: ICD-10-CM

## 2023-05-01 DIAGNOSIS — L98.9 SKIN LESION OF BACK: ICD-10-CM

## 2023-05-01 DIAGNOSIS — I10 ESSENTIAL HYPERTENSION: ICD-10-CM

## 2023-05-01 DIAGNOSIS — G62.9 POLYNEUROPATHY, UNSPECIFIED: ICD-10-CM

## 2023-05-01 DIAGNOSIS — Z00.00 ANNUAL VISIT FOR GENERAL ADULT MEDICAL EXAMINATION WITHOUT ABNORMAL FINDINGS: Primary | ICD-10-CM

## 2023-05-01 DIAGNOSIS — Z00.00 ANNUAL VISIT FOR GENERAL ADULT MEDICAL EXAMINATION WITHOUT ABNORMAL FINDINGS: ICD-10-CM

## 2023-05-01 LAB
BASOPHILS # BLD: 0.1 K/UL (ref 0–0.2)
BASOPHILS NFR BLD: 1 % (ref 0–2)
BILIRUBIN, URINE, POC: NEGATIVE
BLOOD URINE, POC: NEGATIVE
CREAT UR-MCNC: 72 MG/DL
DIFFERENTIAL METHOD BLD: ABNORMAL
EOSINOPHIL # BLD: 0.1 K/UL (ref 0–0.8)
EOSINOPHIL NFR BLD: 1 % (ref 0.5–7.8)
ERYTHROCYTE [DISTWIDTH] IN BLOOD BY AUTOMATED COUNT: 14.5 % (ref 11.9–14.6)
GLUCOSE URINE, POC: ABNORMAL
HCT VFR BLD AUTO: 52.9 % (ref 41.1–50.3)
HGB BLD-MCNC: 17 G/DL (ref 13.6–17.2)
IMM GRANULOCYTES # BLD AUTO: 0.1 K/UL (ref 0–0.5)
IMM GRANULOCYTES NFR BLD AUTO: 1 % (ref 0–5)
KETONES, URINE, POC: NEGATIVE
LEUKOCYTE ESTERASE, URINE, POC: NEGATIVE
LYMPHOCYTES # BLD: 1.6 K/UL (ref 0.5–4.6)
LYMPHOCYTES NFR BLD: 13 % (ref 13–44)
MCH RBC QN AUTO: 29.5 PG (ref 26.1–32.9)
MCHC RBC AUTO-ENTMCNC: 32.1 G/DL (ref 31.4–35)
MCV RBC AUTO: 91.7 FL (ref 82–102)
MICROALBUMIN UR-MCNC: 1.2 MG/DL
MICROALBUMIN/CREAT UR-RTO: 17 MG/G (ref 0–30)
MONOCYTES # BLD: 0.8 K/UL (ref 0.1–1.3)
MONOCYTES NFR BLD: 7 % (ref 4–12)
NEUTS SEG # BLD: 9.2 K/UL (ref 1.7–8.2)
NEUTS SEG NFR BLD: 77 % (ref 43–78)
NITRITE, URINE, POC: NEGATIVE
NRBC # BLD: 0 K/UL (ref 0–0.2)
PH, URINE, POC: 6 (ref 4.6–8)
PLATELET # BLD AUTO: 249 K/UL (ref 150–450)
PMV BLD AUTO: 10 FL (ref 9.4–12.3)
PROTEIN,URINE, POC: ABNORMAL
RBC # BLD AUTO: 5.77 M/UL (ref 4.23–5.6)
SPECIFIC GRAVITY, URINE, POC: 1.01 (ref 1–1.03)
URINALYSIS CLARITY, POC: CLEAR
URINALYSIS COLOR, POC: YELLOW
UROBILINOGEN, POC: NORMAL
WBC # BLD AUTO: 11.8 K/UL (ref 4.3–11.1)

## 2023-05-01 PROCEDURE — 3078F DIAST BP <80 MM HG: CPT | Performed by: FAMILY MEDICINE

## 2023-05-01 PROCEDURE — 81003 URINALYSIS AUTO W/O SCOPE: CPT | Performed by: FAMILY MEDICINE

## 2023-05-01 PROCEDURE — 93000 ELECTROCARDIOGRAM COMPLETE: CPT | Performed by: FAMILY MEDICINE

## 2023-05-01 PROCEDURE — 3074F SYST BP LT 130 MM HG: CPT | Performed by: FAMILY MEDICINE

## 2023-05-01 PROCEDURE — 99396 PREV VISIT EST AGE 40-64: CPT | Performed by: FAMILY MEDICINE

## 2023-05-01 RX ORDER — LISINOPRIL 10 MG/1
10 TABLET ORAL DAILY
Qty: 90 TABLET | Refills: 1 | Status: SHIPPED | OUTPATIENT
Start: 2023-05-01

## 2023-05-01 RX ORDER — ZOLPIDEM TARTRATE 12.5 MG/1
12.5 TABLET, FILM COATED, EXTENDED RELEASE ORAL NIGHTLY PRN
Qty: 90 TABLET | Refills: 1 | Status: SHIPPED | OUTPATIENT
Start: 2023-05-01 | End: 2023-10-28

## 2023-05-01 SDOH — ECONOMIC STABILITY: HOUSING INSECURITY
IN THE LAST 12 MONTHS, WAS THERE A TIME WHEN YOU DID NOT HAVE A STEADY PLACE TO SLEEP OR SLEPT IN A SHELTER (INCLUDING NOW)?: NO

## 2023-05-01 SDOH — ECONOMIC STABILITY: INCOME INSECURITY: HOW HARD IS IT FOR YOU TO PAY FOR THE VERY BASICS LIKE FOOD, HOUSING, MEDICAL CARE, AND HEATING?: NOT HARD AT ALL

## 2023-05-01 SDOH — ECONOMIC STABILITY: FOOD INSECURITY: WITHIN THE PAST 12 MONTHS, YOU WORRIED THAT YOUR FOOD WOULD RUN OUT BEFORE YOU GOT MONEY TO BUY MORE.: NEVER TRUE

## 2023-05-01 SDOH — ECONOMIC STABILITY: FOOD INSECURITY: WITHIN THE PAST 12 MONTHS, THE FOOD YOU BOUGHT JUST DIDN'T LAST AND YOU DIDN'T HAVE MONEY TO GET MORE.: NEVER TRUE

## 2023-05-01 ASSESSMENT — PATIENT HEALTH QUESTIONNAIRE - PHQ9
SUM OF ALL RESPONSES TO PHQ QUESTIONS 1-9: 0
SUM OF ALL RESPONSES TO PHQ QUESTIONS 1-9: 0
1. LITTLE INTEREST OR PLEASURE IN DOING THINGS: 0
SUM OF ALL RESPONSES TO PHQ QUESTIONS 1-9: 0
2. FEELING DOWN, DEPRESSED OR HOPELESS: 0
SUM OF ALL RESPONSES TO PHQ QUESTIONS 1-9: 0
SUM OF ALL RESPONSES TO PHQ9 QUESTIONS 1 & 2: 0

## 2023-05-01 ASSESSMENT — ENCOUNTER SYMPTOMS
SINUS PAIN: 0
DIARRHEA: 0
BACK PAIN: 0
VOMITING: 0
SHORTNESS OF BREATH: 0
NAUSEA: 0
CONSTIPATION: 0
WHEEZING: 0
COUGH: 0

## 2023-05-01 NOTE — PROGRESS NOTES
Specific Gravity, Urine, POC 1.010 1.001 - 1.035    Blood, Urine, POC Negative Negative    pH, Urine, POC 6.0 4.6 - 8.0    Protein, Urine, POC Trace Negative    Urobilinogen, POC Normal     Nitrite, Urine, POC Negative Negative    Leukocyte Esterase, Urine, POC Negative Negative     EKG sinus bradycardia with a first-degree AV V block similar to the EKG done by cardiology a year ago, normal axis, rate was 53, no acute changes noted. Assessment / Plan:       Maryellne Wilson was seen today for annual exam.    Diagnoses and all orders for this visit:    Annual visit for general adult medical examination without abnormal findings  -     CBC with Auto Differential; Future  -     Comprehensive Metabolic Panel; Future  -     Hemoglobin A1C; Future  -     Lipid Panel; Future  -     Testosterone Total Only, Male; Future  -     Vitamin D 25 Hydroxy; Future  -     AMB POC URINALYSIS DIP STICK AUTO W/O MICRO  -     EKG 12 Lead; Future  -     EKG 12 Lead    Type 2 diabetes mellitus with hyperglycemia, without long-term current use of insulin (HCC)  -     linagliptin (TRADJENTA) 5 MG tablet; Take 1 tablet by mouth daily  -     Comprehensive Metabolic Panel; Future  -     Hemoglobin A1C; Future  -     Microalbumin / Creatinine Urine Ratio; Future    Chronic insomnia  -     zolpidem (AMBIEN CR) 12.5 MG extended release tablet; Take 1 tablet by mouth nightly as needed for Sleep for up to 180 days. Elevated hemoglobin A1c  -     Hemoglobin A1C; Future    Elevated triglycerides with high cholesterol  -     Lipid Panel; Future  -     Vitamin B12; Future    Polyneuropathy, unspecified    Acquired hypothyroidism    Deficiency of other specified B group vitamins  -     Vitamin B12; Future    Vitamin D deficiency, unspecified  -     Vitamin D 25 Hydroxy; Future    Essential hypertension  -     CBC with Auto Differential; Future  -     Comprehensive Metabolic Panel;  Future  -     EKG 12 Lead; Future  -     lisinopril (PRINIVIL;ZESTRIL) 10 MG

## 2023-05-02 LAB
25(OH)D3 SERPL-MCNC: 34.8 NG/ML (ref 30–100)
ALBUMIN SERPL-MCNC: 4.3 G/DL (ref 3.2–4.6)
ALBUMIN/GLOB SERPL: 1.2 (ref 0.4–1.6)
ALP SERPL-CCNC: 78 U/L (ref 50–136)
ALT SERPL-CCNC: 16 U/L (ref 12–65)
ANION GAP SERPL CALC-SCNC: 4 MMOL/L (ref 2–11)
AST SERPL-CCNC: 9 U/L (ref 15–37)
BILIRUB SERPL-MCNC: 0.9 MG/DL (ref 0.2–1.1)
BUN SERPL-MCNC: 20 MG/DL (ref 8–23)
CALCIUM SERPL-MCNC: 9.7 MG/DL (ref 8.3–10.4)
CHLORIDE SERPL-SCNC: 102 MMOL/L (ref 101–110)
CHOLEST SERPL-MCNC: 169 MG/DL
CO2 SERPL-SCNC: 29 MMOL/L (ref 21–32)
CREAT SERPL-MCNC: 0.9 MG/DL (ref 0.8–1.5)
EST. AVERAGE GLUCOSE BLD GHB EST-MCNC: 108 MG/DL
GLOBULIN SER CALC-MCNC: 3.6 G/DL (ref 2.8–4.5)
GLUCOSE SERPL-MCNC: 93 MG/DL (ref 65–100)
HBA1C MFR BLD: 5.4 % (ref 4.8–5.6)
HDLC SERPL-MCNC: 59 MG/DL (ref 40–60)
HDLC SERPL: 2.9
LDLC SERPL CALC-MCNC: 99.6 MG/DL
POTASSIUM SERPL-SCNC: 4.5 MMOL/L (ref 3.5–5.1)
PROT SERPL-MCNC: 7.9 G/DL (ref 6.3–8.2)
PSA SERPL-MCNC: 0.3 NG/ML
SODIUM SERPL-SCNC: 135 MMOL/L (ref 133–143)
TRIGL SERPL-MCNC: 52 MG/DL (ref 35–150)
VIT B12 SERPL-MCNC: 367 PG/ML (ref 193–986)
VLDLC SERPL CALC-MCNC: 10.4 MG/DL (ref 6–23)

## 2023-05-03 LAB — TESTOST SERPL-MCNC: 821 NG/DL (ref 264–916)

## 2023-07-20 DIAGNOSIS — G62.9 POLYNEUROPATHY, UNSPECIFIED: ICD-10-CM

## 2023-07-20 RX ORDER — PREGABALIN 150 MG/1
CAPSULE ORAL
Qty: 90 CAPSULE | Refills: 0 | OUTPATIENT
Start: 2023-07-20

## 2023-07-21 DIAGNOSIS — G62.9 POLYNEUROPATHY, UNSPECIFIED: ICD-10-CM

## 2023-07-21 RX ORDER — PREGABALIN 150 MG/1
150 CAPSULE ORAL 3 TIMES DAILY
Qty: 270 CAPSULE | Refills: 3 | OUTPATIENT
Start: 2023-07-21 | End: 2024-07-15

## 2023-07-21 RX ORDER — PREGABALIN 150 MG/1
150 CAPSULE ORAL 3 TIMES DAILY
Qty: 270 CAPSULE | Refills: 3 | Status: SHIPPED | OUTPATIENT
Start: 2023-07-21 | End: 2024-07-15

## 2023-07-21 NOTE — TELEPHONE ENCOUNTER
Sent in prescription for:     Requested Prescriptions     Signed Prescriptions Disp Refills    pregabalin (LYRICA) 150 MG capsule 270 capsule 3     Sig: Take 1 capsule by mouth 3 times daily for 360 days.  Max Daily Amount: 450 mg     Authorizing Provider: Davidson Salter

## 2023-08-22 ENCOUNTER — OFFICE VISIT (OUTPATIENT)
Dept: FAMILY MEDICINE CLINIC | Facility: CLINIC | Age: 63
End: 2023-08-22
Payer: COMMERCIAL

## 2023-08-22 VITALS
RESPIRATION RATE: 16 BRPM | HEART RATE: 73 BPM | DIASTOLIC BLOOD PRESSURE: 79 MMHG | OXYGEN SATURATION: 97 % | WEIGHT: 237.4 LBS | HEIGHT: 72 IN | TEMPERATURE: 97.5 F | BODY MASS INDEX: 32.15 KG/M2 | SYSTOLIC BLOOD PRESSURE: 148 MMHG

## 2023-08-22 DIAGNOSIS — I10 ESSENTIAL HYPERTENSION: ICD-10-CM

## 2023-08-22 DIAGNOSIS — R51.9 ACUTE NONINTRACTABLE HEADACHE, UNSPECIFIED HEADACHE TYPE: ICD-10-CM

## 2023-08-22 DIAGNOSIS — R42 VERTIGO: Primary | ICD-10-CM

## 2023-08-22 PROCEDURE — 3017F COLORECTAL CA SCREEN DOC REV: CPT | Performed by: PHYSICIAN ASSISTANT

## 2023-08-22 PROCEDURE — 99214 OFFICE O/P EST MOD 30 MIN: CPT | Performed by: PHYSICIAN ASSISTANT

## 2023-08-22 PROCEDURE — 1036F TOBACCO NON-USER: CPT | Performed by: PHYSICIAN ASSISTANT

## 2023-08-22 PROCEDURE — G8417 CALC BMI ABV UP PARAM F/U: HCPCS | Performed by: PHYSICIAN ASSISTANT

## 2023-08-22 PROCEDURE — 3077F SYST BP >= 140 MM HG: CPT | Performed by: PHYSICIAN ASSISTANT

## 2023-08-22 PROCEDURE — G8427 DOCREV CUR MEDS BY ELIG CLIN: HCPCS | Performed by: PHYSICIAN ASSISTANT

## 2023-08-22 PROCEDURE — 3078F DIAST BP <80 MM HG: CPT | Performed by: PHYSICIAN ASSISTANT

## 2023-08-22 RX ORDER — DICLOFENAC SODIUM 75 MG/1
75 TABLET, DELAYED RELEASE ORAL 2 TIMES DAILY
Qty: 60 TABLET | Refills: 1 | Status: SHIPPED | OUTPATIENT
Start: 2023-08-22

## 2023-08-22 RX ORDER — DICLOFENAC POTASSIUM 50 MG/1
TABLET, FILM COATED ORAL
COMMUNITY
Start: 2023-08-12

## 2023-08-22 ASSESSMENT — ENCOUNTER SYMPTOMS: SHORTNESS OF BREATH: 0

## 2023-08-22 NOTE — PATIENT INSTRUCTIONS
*A referral has been placed to physical therapy They should contact you in the next week to schedule. Please let us know if you do not hear from them. *Please keep track of your blood pressure. Check it 3-4 days a week. Write down your results and bring them with you to your next office visit for review. We'd like you to stay < 140/90, and ideally < 130/80. Let us know if you find yourself above this routinely. *Use the Diclofenac as prescribed for headaches, joint/back pain.

## 2023-08-22 NOTE — PROGRESS NOTES
[facility]  [address]  [place], [address]  Phone [phone]      Patient: Kunal Farr  YOB: 1960  Age 60 y.o. Sex male  Medical Record:  [id number]  Visit Date: 08/22/23  Author:  Sedrick Eagle PA-C    Family Practice Clinic Note    Chief Complaint   Patient presents with    Dizziness    Headache       History of Present Illness  This is a 77-year-old male who presents today with complaints of recurrent dizziness which has been ongoing now for approximately 1 week. He reports a history of vertigo. States that his first episode was about 15 years ago. He was treated with the Epley maneuver at that time and symptoms resolved. He has had at least 2 or 3 episodes since then but in the past week it has been a fairly persistent issue. Notes that symptoms come and go transiently. Specifically if he is lying down on his left side and sits up he will experience a sense of movement around him like the room is spinning. If he stays still and closes eyes it would resolve within a few moments. He denies lightheadedness, near-syncope/syncope. Denies any associated confusion, disorientation, focal weakness, speech or vision changes. Denies associated nausea or vomiting. States that he has some meclizine at home but his symptoms have not lasted long enough to try this. He has some home exercises that he has done similar issues but they do not seem to be working as well this time. Notes that he has had some recurrent headache for the past week as well. Normally his headaches respond well to over-the-counter ibuprofen but this does not seem to be working this time. He has some diclofenac at home that issues for other neck/back pains but it has not been effective either. Patient's blood pressure was noted to be elevated on intake and repeat check today. He has a history of hypertension and is currently taking lisinopril 10 mg daily.   Notes that his home blood

## 2023-09-13 ENCOUNTER — OFFICE VISIT (OUTPATIENT)
Dept: ORTHOPEDIC SURGERY | Age: 63
End: 2023-09-13
Payer: COMMERCIAL

## 2023-09-13 DIAGNOSIS — Z96.611 STATUS POST TOTAL SHOULDER ARTHROPLASTY, RIGHT: ICD-10-CM

## 2023-09-13 DIAGNOSIS — Z96.612 STATUS POST TOTAL SHOULDER ARTHROPLASTY, LEFT: Primary | ICD-10-CM

## 2023-09-13 PROCEDURE — 3017F COLORECTAL CA SCREEN DOC REV: CPT | Performed by: ORTHOPAEDIC SURGERY

## 2023-09-13 PROCEDURE — G8428 CUR MEDS NOT DOCUMENT: HCPCS | Performed by: ORTHOPAEDIC SURGERY

## 2023-09-13 PROCEDURE — 1036F TOBACCO NON-USER: CPT | Performed by: ORTHOPAEDIC SURGERY

## 2023-09-13 PROCEDURE — 99213 OFFICE O/P EST LOW 20 MIN: CPT | Performed by: ORTHOPAEDIC SURGERY

## 2023-09-13 PROCEDURE — G8417 CALC BMI ABV UP PARAM F/U: HCPCS | Performed by: ORTHOPAEDIC SURGERY

## 2023-09-24 DIAGNOSIS — R51.9 ACUTE NONINTRACTABLE HEADACHE, UNSPECIFIED HEADACHE TYPE: ICD-10-CM

## 2023-09-24 DIAGNOSIS — I10 ESSENTIAL HYPERTENSION: ICD-10-CM

## 2023-09-24 RX ORDER — LISINOPRIL 10 MG/1
10 TABLET ORAL DAILY
Qty: 90 TABLET | Refills: 1 | Status: SHIPPED | OUTPATIENT
Start: 2023-09-24

## 2023-09-25 RX ORDER — DICLOFENAC SODIUM 75 MG/1
75 TABLET, DELAYED RELEASE ORAL 2 TIMES DAILY
Qty: 60 TABLET | Refills: 1 | OUTPATIENT
Start: 2023-09-25

## 2023-10-02 DIAGNOSIS — R51.9 ACUTE NONINTRACTABLE HEADACHE, UNSPECIFIED HEADACHE TYPE: ICD-10-CM

## 2023-10-02 RX ORDER — DICLOFENAC SODIUM 75 MG/1
75 TABLET, DELAYED RELEASE ORAL 2 TIMES DAILY WITH MEALS
Qty: 60 TABLET | Refills: 0 | Status: SHIPPED | OUTPATIENT
Start: 2023-10-02

## 2023-10-23 ENCOUNTER — E-VISIT (OUTPATIENT)
Dept: FAMILY MEDICINE CLINIC | Facility: CLINIC | Age: 63
End: 2023-10-23
Payer: COMMERCIAL

## 2023-10-23 DIAGNOSIS — J01.90 ACUTE NON-RECURRENT SINUSITIS, UNSPECIFIED LOCATION: Primary | ICD-10-CM

## 2023-10-23 PROCEDURE — 99421 OL DIG E/M SVC 5-10 MIN: CPT | Performed by: FAMILY MEDICINE

## 2023-10-23 RX ORDER — CEFUROXIME AXETIL 500 MG/1
500 TABLET ORAL 2 TIMES DAILY
Qty: 20 TABLET | Refills: 0 | Status: SHIPPED | OUTPATIENT
Start: 2023-10-23 | End: 2023-11-02

## 2023-10-23 ASSESSMENT — LIFESTYLE VARIABLES: SMOKING_STATUS: NO, I'VE NEVER SMOKED

## 2023-10-23 NOTE — PROGRESS NOTES
Xavier Croton  1960 initiated an asynchronous digital communication through [id number]. HPI: per patient questionnaire     Exam: not applicable    Diagnoses and all orders for this visit:  Diagnoses and all orders for this visit:    Acute non-recurrent sinusitis, unspecified location  -     cefUROXime (CEFTIN) 500 MG tablet; Take 1 tablet by mouth 2 times daily for 10 days           Time: EV1 - 5-10 minutes were spent on the digital evaluation and management of this patient.       Marah Santoro MD

## 2023-11-13 DIAGNOSIS — F51.04 CHRONIC INSOMNIA: ICD-10-CM

## 2023-11-13 RX ORDER — ZOLPIDEM TARTRATE 12.5 MG/1
TABLET, FILM COATED, EXTENDED RELEASE ORAL
Qty: 30 TABLET | Refills: 0 | Status: SHIPPED | OUTPATIENT
Start: 2023-11-13 | End: 2023-12-13

## 2023-11-17 ENCOUNTER — PATIENT MESSAGE (OUTPATIENT)
Dept: FAMILY MEDICINE CLINIC | Facility: CLINIC | Age: 63
End: 2023-11-17

## 2023-11-17 DIAGNOSIS — R51.9 ACUTE NONINTRACTABLE HEADACHE, UNSPECIFIED HEADACHE TYPE: ICD-10-CM

## 2023-11-17 RX ORDER — DICLOFENAC SODIUM 75 MG/1
75 TABLET, DELAYED RELEASE ORAL 2 TIMES DAILY WITH MEALS
Qty: 60 TABLET | Refills: 5 | Status: SHIPPED | OUTPATIENT
Start: 2023-11-17

## 2023-11-17 NOTE — TELEPHONE ENCOUNTER
From: Salinas Sheets  To: Dr. Aliyah Diallo: 11/17/2023 12:53 PM EST  Subject: Med    I need help on one of my meds. The pain center gave me Diclofenac 500mg. When I saw the nurse practitioner in your office he upped it to 750mg but with no refills. Every month since I've had to request refill every month. What do I need to do to get more refills or do I need to change to something different.      Helder Dodd

## 2023-11-17 NOTE — TELEPHONE ENCOUNTER
Sent in prescription for:     Requested Prescriptions     Signed Prescriptions Disp Refills    diclofenac (VOLTAREN) 75 MG EC tablet 60 tablet 5     Sig: Take 1 tablet by mouth with breakfast and with evening meal     Authorizing Provider: Halie Ponce

## 2023-12-13 DIAGNOSIS — F51.04 CHRONIC INSOMNIA: ICD-10-CM

## 2023-12-13 RX ORDER — ZOLPIDEM TARTRATE 12.5 MG/1
TABLET, FILM COATED, EXTENDED RELEASE ORAL
Qty: 30 TABLET | Refills: 3 | Status: SHIPPED | OUTPATIENT
Start: 2023-12-13 | End: 2024-01-12

## 2023-12-29 DIAGNOSIS — E11.65 TYPE 2 DIABETES MELLITUS WITH HYPERGLYCEMIA, WITHOUT LONG-TERM CURRENT USE OF INSULIN (HCC): ICD-10-CM

## 2023-12-29 RX ORDER — PANTOPRAZOLE SODIUM 40 MG/1
40 TABLET, DELAYED RELEASE ORAL DAILY
Qty: 90 TABLET | Refills: 3 | Status: SHIPPED | OUTPATIENT
Start: 2023-12-29

## 2024-01-07 ENCOUNTER — PATIENT MESSAGE (OUTPATIENT)
Dept: FAMILY MEDICINE CLINIC | Facility: CLINIC | Age: 64
End: 2024-01-07

## 2024-01-09 ENCOUNTER — TELEPHONE (OUTPATIENT)
Dept: FAMILY MEDICINE CLINIC | Facility: CLINIC | Age: 64
End: 2024-01-09

## 2024-01-09 NOTE — TELEPHONE ENCOUNTER
I can try to send in something such as AndroGel but I thought he was getting it compounded at the local compounding pharmacy.  I will have to change the prescription to something like AndroGel if he wants to get it at another pharmacy.  Let me know if that is what he wants to do

## 2024-01-09 NOTE — TELEPHONE ENCOUNTER
Can you please call [facility] in the morning to call in the prescription as below.  The pharmacist was out so they could not take the prescription by phone today    Call in prescription for:     Requested Prescriptions     Signed Prescriptions Disp Refills    UNABLE TO FIND 30 mL 5     Sig: Testosterone 60 mg/ml Apply 1 ml daily     Authorizing Provider: ANAYELI HATCH

## 2024-01-11 NOTE — TELEPHONE ENCOUNTER
Sent in prescription for:     Requested Prescriptions     Signed Prescriptions Disp Refills    SITagliptin (JANUVIA) 100 MG tablet 90 tablet 1     Sig: Take 1 tablet by mouth daily     Authorizing Provider: ANAYELI HATCH

## 2024-01-11 NOTE — TELEPHONE ENCOUNTER
From: Constantin Francis  To: Dr. Wolfgang Tuttle  Sent: 1/7/2024 7:22 AM EST  Subject: linagliptin (TRADJENTA) 5 MG tablet [Dr. Wolfgang Tuttle MD]    [facility] told me this medication requires doctor pre-approval. This is due to insurance change. Can you please complete this and send it in.    Ajay Francis

## 2024-01-22 DIAGNOSIS — G62.9 POLYNEUROPATHY, UNSPECIFIED: ICD-10-CM

## 2024-01-22 RX ORDER — PREGABALIN 150 MG/1
150 CAPSULE ORAL 3 TIMES DAILY
Qty: 270 CAPSULE | Refills: 3 | Status: CANCELLED | OUTPATIENT
Start: 2024-01-22 | End: 2025-01-16

## 2024-02-13 ENCOUNTER — OFFICE VISIT (OUTPATIENT)
Dept: FAMILY MEDICINE CLINIC | Facility: CLINIC | Age: 64
End: 2024-02-13
Payer: COMMERCIAL

## 2024-02-13 VITALS
HEIGHT: 72 IN | BODY MASS INDEX: 31.42 KG/M2 | DIASTOLIC BLOOD PRESSURE: 71 MMHG | WEIGHT: 232 LBS | OXYGEN SATURATION: 98 % | HEART RATE: 97 BPM | SYSTOLIC BLOOD PRESSURE: 121 MMHG | RESPIRATION RATE: 16 BRPM | TEMPERATURE: 97.3 F

## 2024-02-13 DIAGNOSIS — I10 ESSENTIAL HYPERTENSION: ICD-10-CM

## 2024-02-13 DIAGNOSIS — M48.062 SPINAL STENOSIS OF LUMBAR REGION WITH NEUROGENIC CLAUDICATION: ICD-10-CM

## 2024-02-13 DIAGNOSIS — E55.9 VITAMIN D DEFICIENCY, UNSPECIFIED: ICD-10-CM

## 2024-02-13 DIAGNOSIS — F43.21 SITUATIONAL DEPRESSION: ICD-10-CM

## 2024-02-13 DIAGNOSIS — E11.65 TYPE 2 DIABETES MELLITUS WITH HYPERGLYCEMIA, WITHOUT LONG-TERM CURRENT USE OF INSULIN (HCC): Primary | ICD-10-CM

## 2024-02-13 DIAGNOSIS — F51.04 CHRONIC INSOMNIA: ICD-10-CM

## 2024-02-13 DIAGNOSIS — R79.89 LOW TESTOSTERONE: ICD-10-CM

## 2024-02-13 DIAGNOSIS — E78.2 ELEVATED TRIGLYCERIDES WITH HIGH CHOLESTEROL: ICD-10-CM

## 2024-02-13 DIAGNOSIS — E03.9 ACQUIRED HYPOTHYROIDISM: ICD-10-CM

## 2024-02-13 LAB
25(OH)D3 SERPL-MCNC: 29.9 NG/ML (ref 30–100)
ALBUMIN SERPL-MCNC: 4.5 G/DL (ref 3.2–4.6)
ALBUMIN/GLOB SERPL: 1.3 (ref 0.4–1.6)
ALP SERPL-CCNC: 108 U/L (ref 50–136)
ALT SERPL-CCNC: 26 U/L (ref 12–65)
ANION GAP SERPL CALC-SCNC: 6 MMOL/L (ref 2–11)
AST SERPL-CCNC: 18 U/L (ref 15–37)
BASOPHILS # BLD: 0.1 K/UL (ref 0–0.2)
BASOPHILS NFR BLD: 0 % (ref 0–2)
BILIRUB SERPL-MCNC: 0.7 MG/DL (ref 0.2–1.1)
BUN SERPL-MCNC: 17 MG/DL (ref 8–23)
CALCIUM SERPL-MCNC: 9.6 MG/DL (ref 8.3–10.4)
CHLORIDE SERPL-SCNC: 103 MMOL/L (ref 103–113)
CHOLEST SERPL-MCNC: 174 MG/DL
CO2 SERPL-SCNC: 26 MMOL/L (ref 21–32)
CREAT SERPL-MCNC: 0.9 MG/DL (ref 0.8–1.5)
DIFFERENTIAL METHOD BLD: ABNORMAL
EOSINOPHIL # BLD: 0.1 K/UL (ref 0–0.8)
EOSINOPHIL NFR BLD: 0 % (ref 0.5–7.8)
ERYTHROCYTE [DISTWIDTH] IN BLOOD BY AUTOMATED COUNT: 12.8 % (ref 11.9–14.6)
EST. AVERAGE GLUCOSE BLD GHB EST-MCNC: 111 MG/DL
GLOBULIN SER CALC-MCNC: 3.5 G/DL (ref 2.8–4.5)
GLUCOSE SERPL-MCNC: 91 MG/DL (ref 65–100)
HBA1C MFR BLD: 5.5 % (ref 4.8–5.6)
HCT VFR BLD AUTO: 55.8 % (ref 41.1–50.3)
HDLC SERPL-MCNC: 48 MG/DL (ref 40–60)
HDLC SERPL: 3.6
HGB BLD-MCNC: 18.2 G/DL (ref 13.6–17.2)
IMM GRANULOCYTES # BLD AUTO: 0.1 K/UL (ref 0–0.5)
IMM GRANULOCYTES NFR BLD AUTO: 1 % (ref 0–5)
LDLC SERPL CALC-MCNC: 109.8 MG/DL
LYMPHOCYTES # BLD: 1.8 K/UL (ref 0.5–4.6)
LYMPHOCYTES NFR BLD: 16 % (ref 13–44)
MCH RBC QN AUTO: 29.1 PG (ref 26.1–32.9)
MCHC RBC AUTO-ENTMCNC: 32.6 G/DL (ref 31.4–35)
MCV RBC AUTO: 89.1 FL (ref 82–102)
MONOCYTES # BLD: 0.7 K/UL (ref 0.1–1.3)
MONOCYTES NFR BLD: 6 % (ref 4–12)
NEUTS SEG # BLD: 8.8 K/UL (ref 1.7–8.2)
NEUTS SEG NFR BLD: 77 % (ref 43–78)
NRBC # BLD: 0 K/UL (ref 0–0.2)
PLATELET # BLD AUTO: 312 K/UL (ref 150–450)
PMV BLD AUTO: 9.7 FL (ref 9.4–12.3)
POTASSIUM SERPL-SCNC: 4.5 MMOL/L (ref 3.5–5.1)
PROT SERPL-MCNC: 8 G/DL (ref 6.3–8.2)
RBC # BLD AUTO: 6.26 M/UL (ref 4.23–5.6)
SODIUM SERPL-SCNC: 135 MMOL/L (ref 136–146)
TRIGL SERPL-MCNC: 81 MG/DL (ref 35–150)
TSH, 3RD GENERATION: 1.79 UIU/ML (ref 0.36–3.74)
VLDLC SERPL CALC-MCNC: 16.2 MG/DL (ref 6–23)
WBC # BLD AUTO: 11.6 K/UL (ref 4.3–11.1)

## 2024-02-13 PROCEDURE — 99214 OFFICE O/P EST MOD 30 MIN: CPT | Performed by: FAMILY MEDICINE

## 2024-02-13 PROCEDURE — 3074F SYST BP LT 130 MM HG: CPT | Performed by: FAMILY MEDICINE

## 2024-02-13 PROCEDURE — 3078F DIAST BP <80 MM HG: CPT | Performed by: FAMILY MEDICINE

## 2024-02-13 RX ORDER — SUCRALFATE 1 G/1
1 TABLET ORAL 4 TIMES DAILY
Qty: 120 TABLET | Refills: 3 | Status: SHIPPED | OUTPATIENT
Start: 2024-02-13 | End: 2024-02-13 | Stop reason: SDUPTHER

## 2024-02-13 RX ORDER — ZOLPIDEM TARTRATE 12.5 MG/1
12.5 TABLET, FILM COATED, EXTENDED RELEASE ORAL NIGHTLY PRN
Qty: 90 TABLET | Refills: 1 | Status: SHIPPED | OUTPATIENT
Start: 2024-02-13 | End: 2024-08-11

## 2024-02-13 RX ORDER — LISINOPRIL 10 MG/1
10 TABLET ORAL DAILY
Qty: 90 TABLET | Refills: 1 | Status: SHIPPED | OUTPATIENT
Start: 2024-02-13 | End: 2024-02-13 | Stop reason: SDUPTHER

## 2024-02-13 RX ORDER — TRAZODONE HYDROCHLORIDE 50 MG/1
50 TABLET ORAL NIGHTLY PRN
Qty: 30 TABLET | Refills: 5 | Status: SHIPPED | OUTPATIENT
Start: 2024-02-13

## 2024-02-13 RX ORDER — LISINOPRIL 10 MG/1
10 TABLET ORAL DAILY
Qty: 90 TABLET | Refills: 1 | Status: SHIPPED | OUTPATIENT
Start: 2024-02-13

## 2024-02-13 RX ORDER — SUCRALFATE 1 G/1
1 TABLET ORAL 4 TIMES DAILY
Qty: 120 TABLET | Refills: 5 | Status: SHIPPED | OUTPATIENT
Start: 2024-02-13

## 2024-02-13 RX ORDER — ZOLPIDEM TARTRATE 12.5 MG/1
12.5 TABLET, FILM COATED, EXTENDED RELEASE ORAL NIGHTLY PRN
COMMUNITY
End: 2024-02-13 | Stop reason: SDUPTHER

## 2024-02-13 NOTE — PROGRESS NOTES
Subjective:      Patient ID: Constantin Francis is a 63 y.o. male.    HPI  Patient comes in today for follow-up on his blood pressure, fasting labs and medication refills.  The patient did stop taking Lyrica as he is felt like he was having more side effects such as memory loss or dizziness and has not noticed any worsening of the neuropathy but it has not improved.  Some nights it does not bother him a great deal but other nights it will make it difficult to fall asleep.  He has been under more stress as he and his wife are  as she left after the first of the year.  He has felt more depressed from this, denies any suicidal thoughts but is struggling to sleep despite taking the Ambien.  He also has had some upset stomach issues at times and took some Carafate recently to help with that.  Past Medical History:   Diagnosis Date    Elevated triglycerides with high cholesterol 4/16/2013    GERD (gastroesophageal reflux disease)     managed with medication     History of colon polyps 4/16/2013    8/10    Hypertension     medication    IBS (irritable bowel syndrome)     Insomnia     controlled with  Ambien    Knee pain, bilateral     Low testosterone 7/5/2017    Shoulder pain, bilateral     cortisone injections     Tendonitis     left shoulder    Testosterone deficiency     using testosterone gel    Type 2 diabetes mellitus (HCC)     pt states pre-diabetic, FBS , A1c 5.7 (3/4/22), denies hypoglycemia    Unspecified sleep apnea 2011    pt says this is questionable, pt is not using CPAP (noted 2/23/22)     Vertigo 2021    hx        No Known Allergies    Current Outpatient Medications   Medication Sig Dispense Refill    zolpidem (AMBIEN CR) 12.5 MG extended release tablet Take 1 tablet by mouth nightly as needed for Sleep. Max Daily Amount: 12.5 mg      empagliflozin (JARDIANCE) 25 MG tablet Take 1 tablet by mouth daily 90 tablet 3    SITagliptin (JANUVIA) 100 MG tablet Take 1 tablet by mouth daily 90 tablet 1

## 2024-02-14 LAB — PSA SERPL-MCNC: 0.5 NG/ML

## 2024-02-15 LAB — TESTOST SERPL-MCNC: 325 NG/DL (ref 264–916)

## 2024-02-15 NOTE — RESULT ENCOUNTER NOTE
The vitamin D is slightly low at 29.9 with a goal above 30.  He could take over-the-counter vitamin D 5000 units daily.  Cholesterol is 174 with normal less than 200.  Thyroid test is normal.  Blood sugar is good at 91, kidney function and liver enzymes are normal.  Hemoglobin is a little higher at 18.2 which could be secondary to the testosterone replacement.  Testosterone level was 325 which is okay.  PSA is 0.5 with a goal less than 4.  He may want to consider donating blood which can help lower his hemoglobin since he is using the testosterone topical application.  Hemoglobin A1c is 5.5 which is normal.  Let me know if the patient has any questions

## 2024-03-05 ENCOUNTER — OFFICE VISIT (OUTPATIENT)
Dept: FAMILY MEDICINE CLINIC | Facility: CLINIC | Age: 64
End: 2024-03-05
Payer: COMMERCIAL

## 2024-03-05 VITALS
BODY MASS INDEX: 31.69 KG/M2 | TEMPERATURE: 97 F | DIASTOLIC BLOOD PRESSURE: 81 MMHG | SYSTOLIC BLOOD PRESSURE: 129 MMHG | HEART RATE: 80 BPM | HEIGHT: 72 IN | WEIGHT: 234 LBS | OXYGEN SATURATION: 96 % | RESPIRATION RATE: 16 BRPM

## 2024-03-05 DIAGNOSIS — F43.21 SITUATIONAL DEPRESSION: ICD-10-CM

## 2024-03-05 DIAGNOSIS — G62.9 POLYNEUROPATHY, UNSPECIFIED: ICD-10-CM

## 2024-03-05 DIAGNOSIS — B37.2 CANDIDAL DERMATITIS: Primary | ICD-10-CM

## 2024-03-05 PROCEDURE — 99213 OFFICE O/P EST LOW 20 MIN: CPT | Performed by: FAMILY MEDICINE

## 2024-03-05 RX ORDER — AMITRIPTYLINE HYDROCHLORIDE 25 MG/1
25 TABLET, FILM COATED ORAL NIGHTLY
Qty: 90 TABLET | Refills: 1 | Status: SHIPPED | OUTPATIENT
Start: 2024-03-05

## 2024-03-05 RX ORDER — CLOTRIMAZOLE AND BETAMETHASONE DIPROPIONATE 10; .64 MG/G; MG/G
CREAM TOPICAL
Qty: 45 G | Refills: 3 | Status: SHIPPED | OUTPATIENT
Start: 2024-03-05

## 2024-03-05 ASSESSMENT — PATIENT HEALTH QUESTIONNAIRE - PHQ9
SUM OF ALL RESPONSES TO PHQ9 QUESTIONS 1 & 2: 0
9. THOUGHTS THAT YOU WOULD BE BETTER OFF DEAD, OR OF HURTING YOURSELF: NOT AT ALL
10. IF YOU CHECKED OFF ANY PROBLEMS, HOW DIFFICULT HAVE THESE PROBLEMS MADE IT FOR YOU TO DO YOUR WORK, TAKE CARE OF THINGS AT HOME, OR GET ALONG WITH OTHER PEOPLE: 0
SUM OF ALL RESPONSES TO PHQ QUESTIONS 1-9: 2
10. IF YOU CHECKED OFF ANY PROBLEMS, HOW DIFFICULT HAVE THESE PROBLEMS MADE IT FOR YOU TO DO YOUR WORK, TAKE CARE OF THINGS AT HOME, OR GET ALONG WITH OTHER PEOPLE: NOT DIFFICULT AT ALL
5. POOR APPETITE OR OVEREATING: 0
4. FEELING TIRED OR HAVING LITTLE ENERGY: 1
2. FEELING DOWN, DEPRESSED OR HOPELESS: NOT AT ALL
6. FEELING BAD ABOUT YOURSELF - OR THAT YOU ARE A FAILURE OR HAVE LET YOURSELF OR YOUR FAMILY DOWN: 0
SUM OF ALL RESPONSES TO PHQ QUESTIONS 1-9: 2
SUM OF ALL RESPONSES TO PHQ QUESTIONS 1-9: 2
7. TROUBLE CONCENTRATING ON THINGS, SUCH AS READING THE NEWSPAPER OR WATCHING TELEVISION: NOT AT ALL
SUM OF ALL RESPONSES TO PHQ QUESTIONS 1-9: 2
1. LITTLE INTEREST OR PLEASURE IN DOING THINGS: 0
9. THOUGHTS THAT YOU WOULD BE BETTER OFF DEAD, OR OF HURTING YOURSELF: 0
3. TROUBLE FALLING OR STAYING ASLEEP: 1
8. MOVING OR SPEAKING SO SLOWLY THAT OTHER PEOPLE COULD HAVE NOTICED. OR THE OPPOSITE - BEING SO FIDGETY OR RESTLESS THAT YOU HAVE BEEN MOVING AROUND A LOT MORE THAN USUAL: NOT AT ALL
6. FEELING BAD ABOUT YOURSELF - OR THAT YOU ARE A FAILURE OR HAVE LET YOURSELF OR YOUR FAMILY DOWN: NOT AT ALL
7. TROUBLE CONCENTRATING ON THINGS, SUCH AS READING THE NEWSPAPER OR WATCHING TELEVISION: 0
1. LITTLE INTEREST OR PLEASURE IN DOING THINGS: NOT AT ALL
2. FEELING DOWN, DEPRESSED OR HOPELESS: 0
4. FEELING TIRED OR HAVING LITTLE ENERGY: SEVERAL DAYS
5. POOR APPETITE OR OVEREATING: NOT AT ALL
3. TROUBLE FALLING OR STAYING ASLEEP: SEVERAL DAYS
SUM OF ALL RESPONSES TO PHQ QUESTIONS 1-9: 2
8. MOVING OR SPEAKING SO SLOWLY THAT OTHER PEOPLE COULD HAVE NOTICED. OR THE OPPOSITE, BEING SO FIGETY OR RESTLESS THAT YOU HAVE BEEN MOVING AROUND A LOT MORE THAN USUAL: 0

## 2024-03-05 NOTE — PROGRESS NOTES
Subjective:      Patient ID: Constantin Francis is a 63 y.o. male.    HPI  Patient comes in today for follow-up on his symptoms of anxiety, neuropathy.  The Zoloft has worked very well but he still is having some difficulty sleeping at night.  He does take Ambien and the trazodone would help him fall asleep but it took several hours and then he felt hung over the next day.  Patient still is having some pain related to neuropathy but had stopped Lyrica secondary to side effects.  Past Medical History:   Diagnosis Date    Elevated triglycerides with high cholesterol 4/16/2013    GERD (gastroesophageal reflux disease)     managed with medication     History of colon polyps 4/16/2013    8/10    Hypertension     medication    IBS (irritable bowel syndrome)     Insomnia     controlled with  Ambien    Knee pain, bilateral     Low testosterone 7/5/2017    Shoulder pain, bilateral     cortisone injections     Tendonitis     left shoulder    Testosterone deficiency     using testosterone gel    Type 2 diabetes mellitus (HCC)     pt states pre-diabetic, FBS , A1c 5.7 (3/4/22), denies hypoglycemia    Unspecified sleep apnea 2011    pt says this is questionable, pt is not using CPAP (noted 2/23/22)     Vertigo 2021    hx        No Known Allergies    Current Outpatient Medications   Medication Sig Dispense Refill    sertraline (ZOLOFT) 50 MG tablet Take 1 tablet by mouth daily 90 tablet 3    clotrimazole-betamethasone (LOTRISONE) 1-0.05 % cream Apply topically 2 times daily x 5 days. 45 g 3    amitriptyline (ELAVIL) 25 MG tablet Take 1 tablet by mouth nightly 90 tablet 1    sucralfate (CARAFATE) 1 GM tablet Take 1 tablet by mouth 4 times daily 120 tablet 5    lisinopril (PRINIVIL;ZESTRIL) 10 MG tablet Take 1 tablet by mouth daily 90 tablet 1    zolpidem (AMBIEN CR) 12.5 MG extended release tablet Take 1 tablet by mouth nightly as needed for Sleep for up to 180 days. Max Daily Amount: 12.5 mg 90 tablet 1    empagliflozin

## 2024-03-17 ENCOUNTER — PATIENT MESSAGE (OUTPATIENT)
Dept: FAMILY MEDICINE CLINIC | Facility: CLINIC | Age: 64
End: 2024-03-17

## 2024-03-17 RX ORDER — CEFUROXIME AXETIL 500 MG/1
500 TABLET ORAL 2 TIMES DAILY
Qty: 20 TABLET | Refills: 0 | Status: SHIPPED | OUTPATIENT
Start: 2024-03-17 | End: 2024-03-27

## 2024-05-02 ENCOUNTER — OFFICE VISIT (OUTPATIENT)
Dept: FAMILY MEDICINE CLINIC | Facility: CLINIC | Age: 64
End: 2024-05-02
Payer: COMMERCIAL

## 2024-05-02 VITALS
TEMPERATURE: 97.9 F | OXYGEN SATURATION: 98 % | HEART RATE: 71 BPM | WEIGHT: 228.6 LBS | SYSTOLIC BLOOD PRESSURE: 109 MMHG | BODY MASS INDEX: 30.96 KG/M2 | HEIGHT: 72 IN | DIASTOLIC BLOOD PRESSURE: 74 MMHG | RESPIRATION RATE: 16 BRPM

## 2024-05-02 DIAGNOSIS — E55.9 VITAMIN D DEFICIENCY, UNSPECIFIED: ICD-10-CM

## 2024-05-02 DIAGNOSIS — M19.011 PRIMARY OSTEOARTHRITIS OF RIGHT SHOULDER: ICD-10-CM

## 2024-05-02 DIAGNOSIS — I10 ESSENTIAL HYPERTENSION: ICD-10-CM

## 2024-05-02 DIAGNOSIS — E11.65 TYPE 2 DIABETES MELLITUS WITH HYPERGLYCEMIA, WITHOUT LONG-TERM CURRENT USE OF INSULIN (HCC): ICD-10-CM

## 2024-05-02 DIAGNOSIS — M51.36 DDD (DEGENERATIVE DISC DISEASE), LUMBAR: ICD-10-CM

## 2024-05-02 DIAGNOSIS — R79.89 LOW TESTOSTERONE: ICD-10-CM

## 2024-05-02 DIAGNOSIS — E78.2 MIXED HYPERLIPIDEMIA: ICD-10-CM

## 2024-05-02 DIAGNOSIS — Z00.00 ANNUAL PHYSICAL EXAM: Primary | ICD-10-CM

## 2024-05-02 DIAGNOSIS — F51.04 CHRONIC INSOMNIA: ICD-10-CM

## 2024-05-02 DIAGNOSIS — G62.9 POLYNEUROPATHY, UNSPECIFIED: ICD-10-CM

## 2024-05-02 LAB
BASOPHILS # BLD: 0.1 K/UL (ref 0–0.2)
BASOPHILS NFR BLD: 1 % (ref 0–2)
BILIRUBIN, URINE, POC: NEGATIVE
BLOOD URINE, POC: NEGATIVE
DIFFERENTIAL METHOD BLD: NORMAL
EOSINOPHIL # BLD: 0.2 K/UL (ref 0–0.8)
EOSINOPHIL NFR BLD: 1 % (ref 0.5–7.8)
ERYTHROCYTE [DISTWIDTH] IN BLOOD BY AUTOMATED COUNT: 14.5 % (ref 11.9–14.6)
GLUCOSE URINE, POC: ABNORMAL
HCT VFR BLD AUTO: 48.7 % (ref 41.1–50.3)
HGB BLD-MCNC: 15.8 G/DL (ref 13.6–17.2)
IMM GRANULOCYTES # BLD AUTO: 0 K/UL (ref 0–0.5)
IMM GRANULOCYTES NFR BLD AUTO: 0 % (ref 0–5)
KETONES, URINE, POC: ABNORMAL
LEUKOCYTE ESTERASE, URINE, POC: NEGATIVE
LYMPHOCYTES # BLD: 1.6 K/UL (ref 0.5–4.6)
LYMPHOCYTES NFR BLD: 15 % (ref 13–44)
MCH RBC QN AUTO: 29.6 PG (ref 26.1–32.9)
MCHC RBC AUTO-ENTMCNC: 32.4 G/DL (ref 31.4–35)
MCV RBC AUTO: 91.4 FL (ref 82–102)
MONOCYTES # BLD: 0.9 K/UL (ref 0.1–1.3)
MONOCYTES NFR BLD: 9 % (ref 4–12)
NEUTS SEG # BLD: 8 K/UL (ref 1.7–8.2)
NEUTS SEG NFR BLD: 74 % (ref 43–78)
NITRITE, URINE, POC: NEGATIVE
NRBC # BLD: 0 K/UL (ref 0–0.2)
PH, URINE, POC: 5 (ref 4.6–8)
PLATELET # BLD AUTO: 271 K/UL (ref 150–450)
PMV BLD AUTO: 10 FL (ref 9.4–12.3)
PROTEIN,URINE, POC: ABNORMAL
RBC # BLD AUTO: 5.33 M/UL (ref 4.23–5.6)
SPECIFIC GRAVITY, URINE, POC: 1.01 (ref 1–1.03)
URINALYSIS CLARITY, POC: CLEAR
URINALYSIS COLOR, POC: YELLOW
UROBILINOGEN, POC: ABNORMAL
WBC # BLD AUTO: 10.7 K/UL (ref 4.3–11.1)

## 2024-05-02 PROCEDURE — 81002 URINALYSIS NONAUTO W/O SCOPE: CPT | Performed by: FAMILY MEDICINE

## 2024-05-02 PROCEDURE — 3078F DIAST BP <80 MM HG: CPT | Performed by: FAMILY MEDICINE

## 2024-05-02 PROCEDURE — 99396 PREV VISIT EST AGE 40-64: CPT | Performed by: FAMILY MEDICINE

## 2024-05-02 PROCEDURE — 93000 ELECTROCARDIOGRAM COMPLETE: CPT | Performed by: FAMILY MEDICINE

## 2024-05-02 PROCEDURE — 3074F SYST BP LT 130 MM HG: CPT | Performed by: FAMILY MEDICINE

## 2024-05-02 RX ORDER — PREGABALIN 150 MG/1
150 CAPSULE ORAL DAILY
COMMUNITY
Start: 2024-04-09 | End: 2024-05-02 | Stop reason: SINTOL

## 2024-05-02 RX ORDER — TESTOSTERONE 16.2 MG/G
2 GEL TRANSDERMAL DAILY
Qty: 75 G | Refills: 5 | Status: SHIPPED | OUTPATIENT
Start: 2024-05-02 | End: 2024-10-29

## 2024-05-02 RX ORDER — LISINOPRIL 10 MG/1
10 TABLET ORAL DAILY
Qty: 90 TABLET | Refills: 1 | Status: SHIPPED | OUTPATIENT
Start: 2024-05-02

## 2024-05-02 RX ORDER — ZOLPIDEM TARTRATE 12.5 MG/1
12.5 TABLET, FILM COATED, EXTENDED RELEASE ORAL NIGHTLY PRN
Qty: 90 TABLET | Refills: 1 | Status: SHIPPED | OUTPATIENT
Start: 2024-05-02 | End: 2024-10-29

## 2024-05-02 RX ORDER — DULOXETIN HYDROCHLORIDE 30 MG/1
30 CAPSULE, DELAYED RELEASE ORAL DAILY
Qty: 90 CAPSULE | Refills: 3 | Status: SHIPPED | OUTPATIENT
Start: 2024-05-02

## 2024-05-02 SDOH — ECONOMIC STABILITY: FOOD INSECURITY: WITHIN THE PAST 12 MONTHS, THE FOOD YOU BOUGHT JUST DIDN'T LAST AND YOU DIDN'T HAVE MONEY TO GET MORE.: NEVER TRUE

## 2024-05-02 SDOH — ECONOMIC STABILITY: FOOD INSECURITY: WITHIN THE PAST 12 MONTHS, YOU WORRIED THAT YOUR FOOD WOULD RUN OUT BEFORE YOU GOT MONEY TO BUY MORE.: NEVER TRUE

## 2024-05-02 SDOH — ECONOMIC STABILITY: INCOME INSECURITY: HOW HARD IS IT FOR YOU TO PAY FOR THE VERY BASICS LIKE FOOD, HOUSING, MEDICAL CARE, AND HEATING?: NOT VERY HARD

## 2024-05-02 ASSESSMENT — ENCOUNTER SYMPTOMS
BACK PAIN: 0
NAUSEA: 0
SHORTNESS OF BREATH: 0
WHEEZING: 0
COUGH: 0
VOMITING: 0
SINUS PAIN: 0
DIARRHEA: 0
CONSTIPATION: 0

## 2024-05-02 NOTE — PROGRESS NOTES
for up to 180 days. Max Daily Amount: 12.5 mg    Essential hypertension  -     lisinopril (PRINIVIL;ZESTRIL) 10 MG tablet; Take 1 tablet by mouth daily  -     EKG 12 Lead; Future  -     EKG 12 Lead    Primary osteoarthritis of right shoulder    Vitamin D deficiency, unspecified    Polyneuropathy, unspecified  -     DULoxetine (CYMBALTA) 30 MG extended release capsule; Take 1 capsule by mouth daily    Mixed hyperlipidemia    DDD (degenerative disc disease), lumbar    Low testosterone  -     Testosterone (ANDROGEL) 20.25 MG/ACT (1.62%) GEL gel; Place 2 actuation onto the skin daily for 180 days. Max Daily Amount: 2,500 mg    Type 2 diabetes mellitus with hyperglycemia, without long-term current use of insulin (HCC)  -     SITagliptin (JANUVIA) 100 MG tablet; Take 1 tablet by mouth daily    Hemoglobin A1c recently was normal so he may actually hold the Januvia and monitor blood sugars.  Can refer to orthopedics at some point if he would like.  Will try Cymbalta for the neuropathy and see if that will help with his pain in general.  Reviewed recent fasting labs which overall look good    Follow-up and Dispositions    Return in about 6 months (around 11/2/2024), or if symptoms worsen or fail to improve.          Wolfgang Tuttle MD  05/02/24    Dictated using voice recognition software. Proofread, but unrecognized errors may exist.

## 2024-05-03 NOTE — RESULT ENCOUNTER NOTE
The hemoglobin has improved to 15.8.  I did submit the prior authorization on the AndroGel and we are waiting to hear back to see if it is approved

## 2024-05-05 ENCOUNTER — PATIENT MESSAGE (OUTPATIENT)
Dept: FAMILY MEDICINE CLINIC | Facility: CLINIC | Age: 64
End: 2024-05-05

## 2024-05-06 RX ORDER — CEFUROXIME AXETIL 500 MG/1
500 TABLET ORAL 2 TIMES DAILY
Qty: 20 TABLET | Refills: 0 | Status: SHIPPED | OUTPATIENT
Start: 2024-05-06 | End: 2024-05-16

## 2024-05-06 NOTE — TELEPHONE ENCOUNTER
From: Constantin Francis  To: Dr. Wolfgang Tuttle  Sent: 5/5/2024 8:07 PM EDT  Subject: Respiratory infection     You ask me and then it was no. Starting Friday i developed a cough that has gotten worse. My sinus have not been running like before but now it more intense productive cough i did see some green spot in last sputum. I would have waited it out a little longer but with Julies baby coming any day im sure the hospital wont let me in coughing. What should i take or do?     Ajay Francis

## 2024-05-28 ENCOUNTER — PATIENT MESSAGE (OUTPATIENT)
Dept: FAMILY MEDICINE CLINIC | Facility: CLINIC | Age: 64
End: 2024-05-28

## 2024-05-28 DIAGNOSIS — G62.9 POLYNEUROPATHY, UNSPECIFIED: ICD-10-CM

## 2024-05-28 RX ORDER — DULOXETIN HYDROCHLORIDE 60 MG/1
60 CAPSULE, DELAYED RELEASE ORAL DAILY
Qty: 90 CAPSULE | Refills: 3 | Status: SHIPPED | OUTPATIENT
Start: 2024-05-28

## 2024-05-28 NOTE — TELEPHONE ENCOUNTER
Sent in prescription for:     Requested Prescriptions     Signed Prescriptions Disp Refills    DULoxetine (CYMBALTA) 60 MG extended release capsule 90 capsule 3     Sig: Take 1 capsule by mouth daily     Authorizing Provider: ANAYELI HATCH

## 2024-05-28 NOTE — TELEPHONE ENCOUNTER
From: Constantni Francis  To: Dr. Wolfgang Tuttle  Sent: 5/28/2024 5:45 AM EDT  Subject: Increase dose    On the Duloxetine 30mg i think we need to increase the dose to 60mg. It helps some but does help with sleep. I feel i need a higher dose to really help with neuropathy.     Ajay Francis

## 2024-06-27 ENCOUNTER — PATIENT MESSAGE (OUTPATIENT)
Dept: FAMILY MEDICINE CLINIC | Facility: CLINIC | Age: 64
End: 2024-06-27

## 2024-06-27 DIAGNOSIS — G62.9 POLYNEUROPATHY, UNSPECIFIED: Primary | ICD-10-CM

## 2024-06-27 DIAGNOSIS — G62.9 POLYNEUROPATHY, UNSPECIFIED: ICD-10-CM

## 2024-06-27 RX ORDER — DULOXETIN HYDROCHLORIDE 30 MG/1
30 CAPSULE, DELAYED RELEASE ORAL DAILY
Qty: 90 CAPSULE | Refills: 3 | Status: SHIPPED | OUTPATIENT
Start: 2024-06-27

## 2024-06-27 NOTE — TELEPHONE ENCOUNTER
From: Constantin Francis  To: Dr. Wolfgang Tuttle  Sent: 6/27/2024 8:35 AM EDT  Subject: neuropathy     Ok, i tried the higher dose and it really helped the neuropathy however it made me so sleepy i felt drowsy and weak for the next 24hrs. I had to stop. all i wonted to do was sleep. So my question is whats next? I feel like its worse and im now dealing with it 24hrs a day not just at night.     Ajay

## 2024-07-01 RX ORDER — PREGABALIN 25 MG/1
25 CAPSULE ORAL
Qty: 30 CAPSULE | Refills: 3 | Status: SHIPPED | OUTPATIENT
Start: 2024-07-01 | End: 2024-10-29

## 2024-07-01 NOTE — TELEPHONE ENCOUNTER
Sent in prescription for:     Requested Prescriptions     Signed Prescriptions Disp Refills    pregabalin (LYRICA) 25 MG capsule 30 capsule 3     Sig: Take 1 capsule by mouth nightly for 120 days. Max Daily Amount: 25 mg     Authorizing Provider: ANAYELI HATCH

## 2024-09-19 ENCOUNTER — PATIENT MESSAGE (OUTPATIENT)
Dept: FAMILY MEDICINE CLINIC | Facility: CLINIC | Age: 64
End: 2024-09-19

## 2024-09-19 DIAGNOSIS — H92.09 OTALGIA, UNSPECIFIED LATERALITY: Primary | ICD-10-CM

## 2024-09-19 RX ORDER — NEOMYCIN SULFATE, POLYMYXIN B SULFATE, HYDROCORTISONE 3.5; 10000; 1 MG/ML; [USP'U]/ML; MG/ML
4 SOLUTION/ DROPS AURICULAR (OTIC) 3 TIMES DAILY
Qty: 10 ML | Refills: 0 | Status: SHIPPED | OUTPATIENT
Start: 2024-09-19 | End: 2024-09-29

## 2024-09-19 RX ORDER — CEFUROXIME AXETIL 500 MG/1
500 TABLET ORAL 2 TIMES DAILY
Qty: 20 TABLET | Refills: 0 | Status: SHIPPED | OUTPATIENT
Start: 2024-09-19 | End: 2024-09-29

## 2024-09-24 ENCOUNTER — OFFICE VISIT (OUTPATIENT)
Dept: FAMILY MEDICINE CLINIC | Facility: CLINIC | Age: 64
End: 2024-09-24
Payer: COMMERCIAL

## 2024-09-24 VITALS
RESPIRATION RATE: 16 BRPM | WEIGHT: 230.6 LBS | SYSTOLIC BLOOD PRESSURE: 119 MMHG | HEART RATE: 72 BPM | HEIGHT: 72 IN | OXYGEN SATURATION: 97 % | DIASTOLIC BLOOD PRESSURE: 90 MMHG | BODY MASS INDEX: 31.23 KG/M2 | TEMPERATURE: 98.1 F

## 2024-09-24 DIAGNOSIS — H92.01 ACUTE OTALGIA, RIGHT: Primary | ICD-10-CM

## 2024-09-24 PROCEDURE — 99213 OFFICE O/P EST LOW 20 MIN: CPT | Performed by: FAMILY MEDICINE

## 2024-09-24 RX ORDER — METHYLPREDNISOLONE 4 MG
TABLET, DOSE PACK ORAL
Qty: 1 KIT | Refills: 0 | Status: SHIPPED | OUTPATIENT
Start: 2024-09-24

## 2024-09-24 RX ORDER — HYDROCODONE BITARTRATE AND ACETAMINOPHEN 7.5; 325 MG/1; MG/1
1 TABLET ORAL EVERY 6 HOURS PRN
Qty: 20 TABLET | Refills: 0 | Status: SHIPPED | OUTPATIENT
Start: 2024-09-24 | End: 2024-09-29

## 2024-09-24 SDOH — ECONOMIC STABILITY: FOOD INSECURITY: WITHIN THE PAST 12 MONTHS, YOU WORRIED THAT YOUR FOOD WOULD RUN OUT BEFORE YOU GOT MONEY TO BUY MORE.: NEVER TRUE

## 2024-09-24 SDOH — ECONOMIC STABILITY: FOOD INSECURITY: WITHIN THE PAST 12 MONTHS, THE FOOD YOU BOUGHT JUST DIDN'T LAST AND YOU DIDN'T HAVE MONEY TO GET MORE.: NEVER TRUE

## 2024-09-24 SDOH — ECONOMIC STABILITY: INCOME INSECURITY: HOW HARD IS IT FOR YOU TO PAY FOR THE VERY BASICS LIKE FOOD, HOUSING, MEDICAL CARE, AND HEATING?: NOT HARD AT ALL

## 2024-09-24 SDOH — ECONOMIC STABILITY: TRANSPORTATION INSECURITY
IN THE PAST 12 MONTHS, HAS LACK OF TRANSPORTATION KEPT YOU FROM MEETINGS, WORK, OR FROM GETTING THINGS NEEDED FOR DAILY LIVING?: NO

## 2024-09-24 ASSESSMENT — ENCOUNTER SYMPTOMS: FACIAL SWELLING: 0

## 2024-09-30 ENCOUNTER — PATIENT MESSAGE (OUTPATIENT)
Dept: FAMILY MEDICINE CLINIC | Facility: CLINIC | Age: 64
End: 2024-09-30

## 2024-09-30 DIAGNOSIS — G44.52 HEADACHE, NEW DAILY PERSISTENT (NDPH): ICD-10-CM

## 2024-09-30 DIAGNOSIS — M79.2 NEUROPATHIC PAIN: ICD-10-CM

## 2024-09-30 DIAGNOSIS — H92.01 ACUTE OTALGIA, RIGHT: Primary | ICD-10-CM

## 2024-09-30 DIAGNOSIS — R20.0 NUMBNESS: ICD-10-CM

## 2024-09-30 DIAGNOSIS — R20.9 COLD HAND WITHOUT PERIPHERAL VASCULAR DISEASE: ICD-10-CM

## 2024-10-08 DIAGNOSIS — H92.01 ACUTE OTALGIA, RIGHT: ICD-10-CM

## 2024-10-08 DIAGNOSIS — R20.9 COLD HAND WITHOUT PERIPHERAL VASCULAR DISEASE: ICD-10-CM

## 2024-10-08 DIAGNOSIS — M79.2 NEUROPATHIC PAIN: ICD-10-CM

## 2024-10-08 DIAGNOSIS — R20.0 NUMBNESS: ICD-10-CM

## 2024-10-08 DIAGNOSIS — G44.52 HEADACHE, NEW DAILY PERSISTENT (NDPH): ICD-10-CM

## 2024-10-08 NOTE — RESULT ENCOUNTER NOTE
MRI overall looks good.  They did mention some chronic white matter changes which are generally nonspecific and is thought to be related to a history of high blood pressure or migraine headaches in the past.  As far as the sinuses and ears everything looked good.  If you are still having problems we may want to talk to ENT, let me know if you would like a referral and anyone you prefer and I will put that in

## 2024-10-13 ENCOUNTER — PATIENT MESSAGE (OUTPATIENT)
Dept: FAMILY MEDICINE CLINIC | Facility: CLINIC | Age: 64
End: 2024-10-13

## 2024-10-13 DIAGNOSIS — H92.01 ACUTE OTALGIA, RIGHT: Primary | ICD-10-CM

## 2024-11-07 ENCOUNTER — OFFICE VISIT (OUTPATIENT)
Dept: FAMILY MEDICINE CLINIC | Facility: CLINIC | Age: 64
End: 2024-11-07
Payer: COMMERCIAL

## 2024-11-07 VITALS
DIASTOLIC BLOOD PRESSURE: 84 MMHG | SYSTOLIC BLOOD PRESSURE: 136 MMHG | HEART RATE: 75 BPM | TEMPERATURE: 97.2 F | OXYGEN SATURATION: 99 % | WEIGHT: 235.8 LBS | HEIGHT: 72 IN | BODY MASS INDEX: 31.94 KG/M2

## 2024-11-07 DIAGNOSIS — M54.42 CHRONIC LEFT-SIDED LOW BACK PAIN WITH LEFT-SIDED SCIATICA: Primary | ICD-10-CM

## 2024-11-07 DIAGNOSIS — G57.02 PIRIFORMIS SYNDROME OF LEFT SIDE: ICD-10-CM

## 2024-11-07 DIAGNOSIS — G89.29 CHRONIC LEFT-SIDED LOW BACK PAIN WITH LEFT-SIDED SCIATICA: Primary | ICD-10-CM

## 2024-11-07 PROCEDURE — 99213 OFFICE O/P EST LOW 20 MIN: CPT

## 2024-11-07 RX ORDER — BACLOFEN 10 MG/1
10 TABLET ORAL NIGHTLY PRN
Qty: 15 TABLET | Refills: 0 | Status: SHIPPED | OUTPATIENT
Start: 2024-11-07

## 2024-11-07 RX ORDER — PREDNISONE 20 MG/1
TABLET ORAL
Qty: 18 TABLET | Refills: 0 | Status: SHIPPED | OUTPATIENT
Start: 2024-11-07

## 2024-11-07 ASSESSMENT — ENCOUNTER SYMPTOMS
BOWEL INCONTINENCE: 0
BACK PAIN: 1

## 2024-11-07 NOTE — PROGRESS NOTES
/84 (Site: Left Upper Arm, Position: Sitting)   Pulse 75   Temp 97.2 °F (36.2 °C) (Temporal)   Ht 1.829 m (6')   Wt 107 kg (235 lb 12.8 oz)   SpO2 99%   BMI 31.98 kg/m²   Physical Exam  Vitals and nursing note reviewed.   Constitutional:       General: He is awake.      Appearance: Normal appearance.   HENT:      Head: Normocephalic and atraumatic.      Nose: Nose normal. No congestion or rhinorrhea.      Mouth/Throat:      Mouth: Mucous membranes are moist.      Pharynx: Oropharynx is clear.   Eyes:      General: No scleral icterus.     Extraocular Movements: Extraocular movements intact.      Conjunctiva/sclera: Conjunctivae normal.   Cardiovascular:      Rate and Rhythm: Normal rate and regular rhythm.      Pulses: Normal pulses.      Heart sounds: Normal heart sounds. No murmur heard.  Pulmonary:      Effort: Pulmonary effort is normal. No respiratory distress.      Breath sounds: Normal breath sounds. No stridor. No wheezing.   Abdominal:      General: Bowel sounds are normal. There is no distension.      Palpations: Abdomen is soft. There is no mass.      Tenderness: There is no abdominal tenderness. There is no guarding or rebound.   Musculoskeletal:         General: Tenderness present. No swelling or deformity.      Cervical back: Normal range of motion and neck supple. No tenderness.      Right lower leg: No edema.      Left lower leg: No edema.      Comments: Sensation and strength intact bilaterally lower extremities  Tenderness along the piriformis and lower left thigh  Range of motion within all directions relatively normal back pain some mild pain present  Straight leg test positive   Neurological:      General: No focal deficit present.      Mental Status: He is alert and oriented to person, place, and time.   Psychiatric:         Attention and Perception: Attention normal.         Mood and Affect: Mood normal.         Behavior: Behavior normal. Behavior is cooperative.         Thought

## 2024-11-10 ENCOUNTER — PATIENT MESSAGE (OUTPATIENT)
Dept: FAMILY MEDICINE CLINIC | Facility: CLINIC | Age: 64
End: 2024-11-10

## 2024-11-10 DIAGNOSIS — R79.89 LOW TESTOSTERONE: ICD-10-CM

## 2024-11-10 DIAGNOSIS — F51.04 CHRONIC INSOMNIA: Primary | ICD-10-CM

## 2024-11-10 RX ORDER — TESTOSTERONE 1.62 MG/G
2 GEL TRANSDERMAL DAILY
Qty: 75 G | Refills: 5 | Status: SHIPPED | OUTPATIENT
Start: 2024-11-10 | End: 2025-05-09

## 2024-11-10 RX ORDER — ZOLPIDEM TARTRATE 12.5 MG/1
12.5 TABLET, FILM COATED, EXTENDED RELEASE ORAL NIGHTLY PRN
Qty: 30 TABLET | Refills: 5 | Status: SHIPPED | OUTPATIENT
Start: 2024-11-10 | End: 2025-05-09

## 2024-11-11 RX ORDER — PANTOPRAZOLE SODIUM 40 MG/1
40 TABLET, DELAYED RELEASE ORAL DAILY
Qty: 90 TABLET | Refills: 3 | Status: SHIPPED | OUTPATIENT
Start: 2024-11-11

## 2024-12-24 ENCOUNTER — PATIENT MESSAGE (OUTPATIENT)
Dept: FAMILY MEDICINE CLINIC | Facility: CLINIC | Age: 64
End: 2024-12-24

## 2024-12-25 RX ORDER — LUBIPROSTONE 8 UG/1
8 CAPSULE ORAL 2 TIMES DAILY WITH MEALS
Qty: 180 CAPSULE | Refills: 3 | Status: SHIPPED | OUTPATIENT
Start: 2024-12-25

## 2024-12-25 NOTE — TELEPHONE ENCOUNTER
Sent in prescription for:     Requested Prescriptions     Signed Prescriptions Disp Refills    lubiprostone (AMITIZA) 8 MCG CAPS capsule 180 capsule 3     Sig: Take 1 capsule by mouth 2 times daily (with meals)     Authorizing Provider: ANAYELI HATCH

## 2025-02-04 SDOH — ECONOMIC STABILITY: FOOD INSECURITY: WITHIN THE PAST 12 MONTHS, YOU WORRIED THAT YOUR FOOD WOULD RUN OUT BEFORE YOU GOT MONEY TO BUY MORE.: NEVER TRUE

## 2025-02-04 SDOH — ECONOMIC STABILITY: FOOD INSECURITY: WITHIN THE PAST 12 MONTHS, THE FOOD YOU BOUGHT JUST DIDN'T LAST AND YOU DIDN'T HAVE MONEY TO GET MORE.: NEVER TRUE

## 2025-02-04 SDOH — ECONOMIC STABILITY: INCOME INSECURITY: IN THE LAST 12 MONTHS, WAS THERE A TIME WHEN YOU WERE NOT ABLE TO PAY THE MORTGAGE OR RENT ON TIME?: NO

## 2025-02-04 SDOH — ECONOMIC STABILITY: TRANSPORTATION INSECURITY
IN THE PAST 12 MONTHS, HAS THE LACK OF TRANSPORTATION KEPT YOU FROM MEDICAL APPOINTMENTS OR FROM GETTING MEDICATIONS?: NO

## 2025-02-04 ASSESSMENT — PATIENT HEALTH QUESTIONNAIRE - PHQ9
1. LITTLE INTEREST OR PLEASURE IN DOING THINGS: NOT AT ALL
4. FEELING TIRED OR HAVING LITTLE ENERGY: SEVERAL DAYS
1. LITTLE INTEREST OR PLEASURE IN DOING THINGS: NOT AT ALL
5. POOR APPETITE OR OVEREATING: NOT AT ALL
4. FEELING TIRED OR HAVING LITTLE ENERGY: SEVERAL DAYS
SUM OF ALL RESPONSES TO PHQ9 QUESTIONS 1 & 2: 0
SUM OF ALL RESPONSES TO PHQ QUESTIONS 1-9: 2
SUM OF ALL RESPONSES TO PHQ QUESTIONS 1-9: 2
2. FEELING DOWN, DEPRESSED OR HOPELESS: NOT AT ALL
10. IF YOU CHECKED OFF ANY PROBLEMS, HOW DIFFICULT HAVE THESE PROBLEMS MADE IT FOR YOU TO DO YOUR WORK, TAKE CARE OF THINGS AT HOME, OR GET ALONG WITH OTHER PEOPLE: NOT DIFFICULT AT ALL
SUM OF ALL RESPONSES TO PHQ QUESTIONS 1-9: 2
7. TROUBLE CONCENTRATING ON THINGS, SUCH AS READING THE NEWSPAPER OR WATCHING TELEVISION: NOT AT ALL
8. MOVING OR SPEAKING SO SLOWLY THAT OTHER PEOPLE COULD HAVE NOTICED. OR THE OPPOSITE - BEING SO FIDGETY OR RESTLESS THAT YOU HAVE BEEN MOVING AROUND A LOT MORE THAN USUAL: NOT AT ALL
3. TROUBLE FALLING OR STAYING ASLEEP: SEVERAL DAYS
7. TROUBLE CONCENTRATING ON THINGS, SUCH AS READING THE NEWSPAPER OR WATCHING TELEVISION: NOT AT ALL
SUM OF ALL RESPONSES TO PHQ QUESTIONS 1-9: 2
9. THOUGHTS THAT YOU WOULD BE BETTER OFF DEAD, OR OF HURTING YOURSELF: NOT AT ALL
8. MOVING OR SPEAKING SO SLOWLY THAT OTHER PEOPLE COULD HAVE NOTICED. OR THE OPPOSITE, BEING SO FIGETY OR RESTLESS THAT YOU HAVE BEEN MOVING AROUND A LOT MORE THAN USUAL: NOT AT ALL
3. TROUBLE FALLING OR STAYING ASLEEP: SEVERAL DAYS
9. THOUGHTS THAT YOU WOULD BE BETTER OFF DEAD, OR OF HURTING YOURSELF: NOT AT ALL
2. FEELING DOWN, DEPRESSED OR HOPELESS: NOT AT ALL
SUM OF ALL RESPONSES TO PHQ QUESTIONS 1-9: 2
6. FEELING BAD ABOUT YOURSELF - OR THAT YOU ARE A FAILURE OR HAVE LET YOURSELF OR YOUR FAMILY DOWN: NOT AT ALL
5. POOR APPETITE OR OVEREATING: NOT AT ALL
10. IF YOU CHECKED OFF ANY PROBLEMS, HOW DIFFICULT HAVE THESE PROBLEMS MADE IT FOR YOU TO DO YOUR WORK, TAKE CARE OF THINGS AT HOME, OR GET ALONG WITH OTHER PEOPLE: NOT DIFFICULT AT ALL
6. FEELING BAD ABOUT YOURSELF - OR THAT YOU ARE A FAILURE OR HAVE LET YOURSELF OR YOUR FAMILY DOWN: NOT AT ALL

## 2025-02-07 ENCOUNTER — OFFICE VISIT (OUTPATIENT)
Dept: FAMILY MEDICINE CLINIC | Facility: CLINIC | Age: 65
End: 2025-02-07
Payer: COMMERCIAL

## 2025-02-07 VITALS
OXYGEN SATURATION: 96 % | DIASTOLIC BLOOD PRESSURE: 80 MMHG | WEIGHT: 236.2 LBS | RESPIRATION RATE: 16 BRPM | TEMPERATURE: 97.5 F | HEIGHT: 72 IN | SYSTOLIC BLOOD PRESSURE: 106 MMHG | HEART RATE: 80 BPM | BODY MASS INDEX: 31.99 KG/M2

## 2025-02-07 DIAGNOSIS — Z12.5 PROSTATE CANCER SCREENING: ICD-10-CM

## 2025-02-07 DIAGNOSIS — R53.83 FATIGUE, UNSPECIFIED TYPE: ICD-10-CM

## 2025-02-07 DIAGNOSIS — J01.90 ACUTE NON-RECURRENT SINUSITIS, UNSPECIFIED LOCATION: ICD-10-CM

## 2025-02-07 DIAGNOSIS — R79.89 LOW TESTOSTERONE: ICD-10-CM

## 2025-02-07 DIAGNOSIS — E11.65 TYPE 2 DIABETES MELLITUS WITH HYPERGLYCEMIA, WITHOUT LONG-TERM CURRENT USE OF INSULIN (HCC): ICD-10-CM

## 2025-02-07 DIAGNOSIS — E11.65 TYPE 2 DIABETES MELLITUS WITH HYPERGLYCEMIA, WITHOUT LONG-TERM CURRENT USE OF INSULIN (HCC): Primary | ICD-10-CM

## 2025-02-07 DIAGNOSIS — I10 ESSENTIAL HYPERTENSION: ICD-10-CM

## 2025-02-07 DIAGNOSIS — B37.2 CANDIDAL DERMATITIS: ICD-10-CM

## 2025-02-07 DIAGNOSIS — G62.9 POLYNEUROPATHY, UNSPECIFIED: ICD-10-CM

## 2025-02-07 LAB
ALBUMIN SERPL-MCNC: 4.2 G/DL (ref 3.2–4.6)
ALBUMIN/GLOB SERPL: 1.4 (ref 1–1.9)
ALP SERPL-CCNC: 105 U/L (ref 40–129)
ALT SERPL-CCNC: 14 U/L (ref 8–55)
ANION GAP SERPL CALC-SCNC: 11 MMOL/L (ref 7–16)
AST SERPL-CCNC: 18 U/L (ref 15–37)
BASOPHILS # BLD: 0.04 K/UL (ref 0–0.2)
BASOPHILS NFR BLD: 0.5 % (ref 0–2)
BILIRUB SERPL-MCNC: 0.7 MG/DL (ref 0–1.2)
BUN SERPL-MCNC: 16 MG/DL (ref 8–23)
CALCIUM SERPL-MCNC: 9.4 MG/DL (ref 8.8–10.2)
CHLORIDE SERPL-SCNC: 104 MMOL/L (ref 98–107)
CHOLEST SERPL-MCNC: 150 MG/DL (ref 0–200)
CO2 SERPL-SCNC: 27 MMOL/L (ref 20–29)
CREAT SERPL-MCNC: 0.75 MG/DL (ref 0.8–1.3)
DIFFERENTIAL METHOD BLD: NORMAL
EOSINOPHIL # BLD: 0.1 K/UL (ref 0–0.8)
EOSINOPHIL NFR BLD: 1.2 % (ref 0.5–7.8)
ERYTHROCYTE [DISTWIDTH] IN BLOOD BY AUTOMATED COUNT: 13.2 % (ref 11.9–14.6)
EST. AVERAGE GLUCOSE BLD GHB EST-MCNC: 126 MG/DL
GLOBULIN SER CALC-MCNC: 2.9 G/DL (ref 2.3–3.5)
GLUCOSE SERPL-MCNC: 104 MG/DL (ref 70–99)
HBA1C MFR BLD: 6 % (ref 0–5.6)
HCT VFR BLD AUTO: 49.2 % (ref 41.1–50.3)
HDLC SERPL-MCNC: 49 MG/DL (ref 40–60)
HDLC SERPL: 3.1 (ref 0–5)
HGB BLD-MCNC: 16.3 G/DL (ref 13.6–17.2)
IMM GRANULOCYTES # BLD AUTO: 0.03 K/UL (ref 0–0.5)
IMM GRANULOCYTES NFR BLD AUTO: 0.4 % (ref 0–5)
LDLC SERPL CALC-MCNC: 84 MG/DL (ref 0–100)
LYMPHOCYTES # BLD: 1.61 K/UL (ref 0.5–4.6)
LYMPHOCYTES NFR BLD: 19 % (ref 13–44)
MCH RBC QN AUTO: 29.4 PG (ref 26.1–32.9)
MCHC RBC AUTO-ENTMCNC: 33.1 G/DL (ref 31.4–35)
MCV RBC AUTO: 88.6 FL (ref 82–102)
MONOCYTES # BLD: 0.63 K/UL (ref 0.1–1.3)
MONOCYTES NFR BLD: 7.4 % (ref 4–12)
NEUTS SEG # BLD: 6.08 K/UL (ref 1.7–8.2)
NEUTS SEG NFR BLD: 71.5 % (ref 43–78)
NRBC # BLD: 0 K/UL (ref 0–0.2)
PLATELET # BLD AUTO: 278 K/UL (ref 150–450)
PMV BLD AUTO: 9.7 FL (ref 9.4–12.3)
POTASSIUM SERPL-SCNC: 4.2 MMOL/L (ref 3.5–5.1)
PROT SERPL-MCNC: 7.1 G/DL (ref 6.3–8.2)
PSA SERPL-MCNC: 0.4 NG/ML (ref 0–4)
RBC # BLD AUTO: 5.55 M/UL (ref 4.23–5.6)
SODIUM SERPL-SCNC: 141 MMOL/L (ref 136–145)
TRIGL SERPL-MCNC: 86 MG/DL (ref 0–150)
TSH, 3RD GENERATION: 0.92 UIU/ML (ref 0.27–4.2)
VLDLC SERPL CALC-MCNC: 17 MG/DL (ref 6–23)
WBC # BLD AUTO: 8.5 K/UL (ref 4.3–11.1)

## 2025-02-07 PROCEDURE — 99214 OFFICE O/P EST MOD 30 MIN: CPT | Performed by: FAMILY MEDICINE

## 2025-02-07 PROCEDURE — 1123F ACP DISCUSS/DSCN MKR DOCD: CPT | Performed by: FAMILY MEDICINE

## 2025-02-07 PROCEDURE — 3074F SYST BP LT 130 MM HG: CPT | Performed by: FAMILY MEDICINE

## 2025-02-07 PROCEDURE — 3079F DIAST BP 80-89 MM HG: CPT | Performed by: FAMILY MEDICINE

## 2025-02-07 RX ORDER — LISINOPRIL 10 MG/1
10 TABLET ORAL DAILY
Qty: 90 TABLET | Refills: 3 | Status: SHIPPED | OUTPATIENT
Start: 2025-02-07

## 2025-02-07 RX ORDER — METHYLPREDNISOLONE 4 MG/1
TABLET ORAL
Qty: 1 KIT | Refills: 0 | Status: SHIPPED | OUTPATIENT
Start: 2025-02-07

## 2025-02-07 RX ORDER — PIOGLITAZONE 15 MG/1
15 TABLET ORAL DAILY
Qty: 90 TABLET | Refills: 3 | Status: SHIPPED | OUTPATIENT
Start: 2025-02-07

## 2025-02-07 RX ORDER — CLOTRIMAZOLE AND BETAMETHASONE DIPROPIONATE 10; .64 MG/G; MG/G
CREAM TOPICAL
Qty: 45 G | Refills: 3 | Status: SHIPPED | OUTPATIENT
Start: 2025-02-07

## 2025-02-07 RX ORDER — CEFUROXIME AXETIL 500 MG/1
500 TABLET ORAL 2 TIMES DAILY
Qty: 20 TABLET | Refills: 0 | Status: SHIPPED | OUTPATIENT
Start: 2025-02-07 | End: 2025-02-17

## 2025-02-07 ASSESSMENT — ENCOUNTER SYMPTOMS
COUGH: 1
RHINORRHEA: 1

## 2025-02-09 LAB — TESTOST SERPL-MCNC: 450 NG/DL (ref 264–916)

## 2025-02-10 NOTE — RESULT ENCOUNTER NOTE
The blood sugar was 104 with normal less than 110.  Thyroid test is normal.  Kidney function and liver enzymes are good hemoglobin A1c is 6 with a goal less than 7.  Testosterone level is 450 which is good.  Cholesterol is 150 with normal less than 200.  PSA is 0.4 with normal less than 4.  Hemoglobin is good at 16.3

## 2025-04-25 ENCOUNTER — OFFICE VISIT (OUTPATIENT)
Dept: FAMILY MEDICINE CLINIC | Facility: CLINIC | Age: 65
End: 2025-04-25
Payer: COMMERCIAL

## 2025-04-25 ENCOUNTER — PATIENT MESSAGE (OUTPATIENT)
Dept: FAMILY MEDICINE CLINIC | Facility: CLINIC | Age: 65
End: 2025-04-25

## 2025-04-25 VITALS
TEMPERATURE: 97.3 F | SYSTOLIC BLOOD PRESSURE: 121 MMHG | DIASTOLIC BLOOD PRESSURE: 84 MMHG | BODY MASS INDEX: 32.75 KG/M2 | OXYGEN SATURATION: 97 % | HEART RATE: 73 BPM | HEIGHT: 72 IN | RESPIRATION RATE: 16 BRPM | WEIGHT: 241.8 LBS

## 2025-04-25 DIAGNOSIS — E11.65 TYPE 2 DIABETES MELLITUS WITH HYPERGLYCEMIA, WITHOUT LONG-TERM CURRENT USE OF INSULIN (HCC): Primary | ICD-10-CM

## 2025-04-25 DIAGNOSIS — R79.89 LOW TESTOSTERONE: ICD-10-CM

## 2025-04-25 DIAGNOSIS — G62.9 POLYNEUROPATHY, UNSPECIFIED: ICD-10-CM

## 2025-04-25 DIAGNOSIS — F51.04 CHRONIC INSOMNIA: ICD-10-CM

## 2025-04-25 PROCEDURE — 99213 OFFICE O/P EST LOW 20 MIN: CPT | Performed by: FAMILY MEDICINE

## 2025-04-25 PROCEDURE — 3044F HG A1C LEVEL LT 7.0%: CPT | Performed by: FAMILY MEDICINE

## 2025-04-25 PROCEDURE — 1123F ACP DISCUSS/DSCN MKR DOCD: CPT | Performed by: FAMILY MEDICINE

## 2025-04-25 RX ORDER — ZOLPIDEM TARTRATE 12.5 MG/1
12.5 TABLET, FILM COATED, EXTENDED RELEASE ORAL NIGHTLY PRN
Qty: 30 TABLET | Refills: 5 | Status: SHIPPED | OUTPATIENT
Start: 2025-04-25 | End: 2025-10-22

## 2025-04-25 RX ORDER — DULOXETIN HYDROCHLORIDE 30 MG/1
30 CAPSULE, DELAYED RELEASE ORAL DAILY
Qty: 90 CAPSULE | Refills: 3 | Status: SHIPPED | OUTPATIENT
Start: 2025-04-25

## 2025-04-25 NOTE — PROGRESS NOTES
HISTORY OF PRESENT ILLNESS     Constantin Francis is a 64 y.o. male who presents for       HPI  Patient comes in today for follow-up on his diabetes.  The patient was having difficulty with the expense of Jardiance so he stopped taking it and we attempted pioglitazone but he did not see any significant improvement in his blood sugars.  He ended up stopping both medications and actually has felt better and been closely monitoring his blood sugar and is typically been less than 130 fasting.  Usually it will stay below 160 postprandial.  He even feels like the neuropathy symptoms have improved been off of the medication.    Past Medical History:   Diagnosis Date    Elevated triglycerides with high cholesterol 4/16/2013    GERD (gastroesophageal reflux disease)     managed with medication     History of colon polyps 4/16/2013    8/10    Hypertension     medication    IBS (irritable bowel syndrome)     Insomnia     controlled with  Ambien    Knee pain, bilateral     Low testosterone 7/5/2017    Shoulder pain, bilateral     cortisone injections     Tendonitis     left shoulder    Testosterone deficiency     using testosterone gel    Type 2 diabetes mellitus (HCC)     pt states pre-diabetic, FBS , A1c 5.7 (3/4/22), denies hypoglycemia    Unspecified sleep apnea 2011    pt says this is questionable, pt is not using CPAP (noted 2/23/22)     Vertigo 2021    hx        No Known Allergies    Current Outpatient Medications   Medication Sig Dispense Refill    zolpidem (AMBIEN CR) 12.5 MG extended release tablet Take 1 tablet by mouth nightly as needed for Sleep for up to 180 days. Max Daily Amount: 12.5 mg 30 tablet 5    DULoxetine (CYMBALTA) 30 MG extended release capsule Take 1 capsule by mouth daily 90 capsule 3    lisinopril (PRINIVIL;ZESTRIL) 10 MG tablet Take 1 tablet by mouth daily 90 tablet 3    clotrimazole-betamethasone (LOTRISONE) 1-0.05 % cream Apply topically 2 times daily x 5 days. 45 g 3    pantoprazole

## 2025-06-03 ENCOUNTER — OFFICE VISIT (OUTPATIENT)
Dept: FAMILY MEDICINE CLINIC | Facility: CLINIC | Age: 65
End: 2025-06-03
Payer: COMMERCIAL

## 2025-06-03 VITALS
RESPIRATION RATE: 16 BRPM | SYSTOLIC BLOOD PRESSURE: 144 MMHG | HEART RATE: 66 BPM | TEMPERATURE: 97.9 F | WEIGHT: 245 LBS | BODY MASS INDEX: 33.18 KG/M2 | DIASTOLIC BLOOD PRESSURE: 83 MMHG | OXYGEN SATURATION: 97 % | HEIGHT: 72 IN

## 2025-06-03 DIAGNOSIS — E11.65 TYPE 2 DIABETES MELLITUS WITH HYPERGLYCEMIA, WITHOUT LONG-TERM CURRENT USE OF INSULIN (HCC): Primary | ICD-10-CM

## 2025-06-03 DIAGNOSIS — R79.89 LOW TESTOSTERONE: ICD-10-CM

## 2025-06-03 DIAGNOSIS — G62.9 POLYNEUROPATHY, UNSPECIFIED: ICD-10-CM

## 2025-06-03 DIAGNOSIS — I10 ESSENTIAL HYPERTENSION: ICD-10-CM

## 2025-06-03 LAB
ALBUMIN SERPL-MCNC: 3.8 G/DL (ref 3.2–4.6)
ALBUMIN/GLOB SERPL: 1.2 (ref 1–1.9)
ALP SERPL-CCNC: 99 U/L (ref 40–129)
ALT SERPL-CCNC: 14 U/L (ref 8–55)
ANION GAP SERPL CALC-SCNC: 11 MMOL/L (ref 7–16)
AST SERPL-CCNC: 17 U/L (ref 15–37)
BASOPHILS # BLD: 0.03 K/UL (ref 0–0.2)
BASOPHILS NFR BLD: 0.4 % (ref 0–2)
BILIRUB SERPL-MCNC: 0.6 MG/DL (ref 0–1.2)
BUN SERPL-MCNC: 16 MG/DL (ref 8–23)
CALCIUM SERPL-MCNC: 9.6 MG/DL (ref 8.8–10.2)
CHLORIDE SERPL-SCNC: 104 MMOL/L (ref 98–107)
CHOLEST SERPL-MCNC: 157 MG/DL (ref 0–200)
CO2 SERPL-SCNC: 26 MMOL/L (ref 20–29)
CREAT SERPL-MCNC: 0.68 MG/DL (ref 0.8–1.3)
CREAT UR-MCNC: 139 MG/DL (ref 39–259)
DIFFERENTIAL METHOD BLD: NORMAL
EOSINOPHIL # BLD: 0.09 K/UL (ref 0–0.8)
EOSINOPHIL NFR BLD: 1.2 % (ref 0.5–7.8)
ERYTHROCYTE [DISTWIDTH] IN BLOOD BY AUTOMATED COUNT: 13.4 % (ref 11.9–14.6)
EST. AVERAGE GLUCOSE BLD GHB EST-MCNC: 122 MG/DL
GLOBULIN SER CALC-MCNC: 3.3 G/DL (ref 2.3–3.5)
GLUCOSE SERPL-MCNC: 111 MG/DL (ref 70–99)
HBA1C MFR BLD: 5.9 % (ref 0–5.6)
HCT VFR BLD AUTO: 46.3 % (ref 41.1–50.3)
HDLC SERPL-MCNC: 50 MG/DL (ref 40–60)
HDLC SERPL: 3.2 (ref 0–5)
HGB BLD-MCNC: 15.1 G/DL (ref 13.6–17.2)
IMM GRANULOCYTES # BLD AUTO: 0.02 K/UL (ref 0–0.5)
IMM GRANULOCYTES NFR BLD AUTO: 0.3 % (ref 0–5)
LDLC SERPL CALC-MCNC: 91 MG/DL (ref 0–100)
LYMPHOCYTES # BLD: 1.38 K/UL (ref 0.5–4.6)
LYMPHOCYTES NFR BLD: 17.8 % (ref 13–44)
MCH RBC QN AUTO: 29.3 PG (ref 26.1–32.9)
MCHC RBC AUTO-ENTMCNC: 32.6 G/DL (ref 31.4–35)
MCV RBC AUTO: 89.9 FL (ref 82–102)
MICROALBUMIN UR-MCNC: <1.2 MG/DL (ref 0–20)
MICROALBUMIN/CREAT UR-RTO: NORMAL MG/G (ref 0–30)
MONOCYTES # BLD: 0.56 K/UL (ref 0.1–1.3)
MONOCYTES NFR BLD: 7.2 % (ref 4–12)
NEUTS SEG # BLD: 5.69 K/UL (ref 1.7–8.2)
NEUTS SEG NFR BLD: 73.1 % (ref 43–78)
NRBC # BLD: 0 K/UL (ref 0–0.2)
PLATELET # BLD AUTO: 284 K/UL (ref 150–450)
PMV BLD AUTO: 9.8 FL (ref 9.4–12.3)
POTASSIUM SERPL-SCNC: 4.1 MMOL/L (ref 3.5–5.1)
PROT SERPL-MCNC: 7.1 G/DL (ref 6.3–8.2)
RBC # BLD AUTO: 5.15 M/UL (ref 4.23–5.6)
SODIUM SERPL-SCNC: 141 MMOL/L (ref 136–145)
TRIGL SERPL-MCNC: 81 MG/DL (ref 0–150)
VLDLC SERPL CALC-MCNC: 16 MG/DL (ref 6–23)
WBC # BLD AUTO: 7.8 K/UL (ref 4.3–11.1)

## 2025-06-03 PROCEDURE — 1123F ACP DISCUSS/DSCN MKR DOCD: CPT | Performed by: FAMILY MEDICINE

## 2025-06-03 PROCEDURE — 99214 OFFICE O/P EST MOD 30 MIN: CPT | Performed by: FAMILY MEDICINE

## 2025-06-03 PROCEDURE — 3079F DIAST BP 80-89 MM HG: CPT | Performed by: FAMILY MEDICINE

## 2025-06-03 PROCEDURE — 3077F SYST BP >= 140 MM HG: CPT | Performed by: FAMILY MEDICINE

## 2025-06-03 PROCEDURE — 3044F HG A1C LEVEL LT 7.0%: CPT | Performed by: FAMILY MEDICINE

## 2025-06-03 RX ORDER — TESTOSTERONE 1.62 MG/G
2 GEL TRANSDERMAL DAILY
Qty: 75 G | Refills: 5 | Status: SHIPPED | OUTPATIENT
Start: 2025-06-03 | End: 2025-11-30

## 2025-06-03 NOTE — PROGRESS NOTES
for diabetes and peripheral neuropathy.    Diagnoses and all orders for this visit:    Type 2 diabetes mellitus with hyperglycemia, without long-term current use of insulin (HCC)  -     Lipid Panel; Future  -     Hemoglobin A1C; Future  -     Albumin/Creatinine Ratio, Urine; Future    Low testosterone  -     Testosterone (ANDROGEL) 20.25 MG/ACT (1.62%) GEL gel; Place 2 actuation onto the skin daily for 180 days. Max Daily Amount: 2,500 mg  -     Testosterone Total Only, Male; Future    Polyneuropathy, unspecified    Essential hypertension  -     CBC with Auto Differential; Future  -     Comprehensive Metabolic Panel; Future    Will notify patient of test results.     Assessment & Plan  1. Diabetes mellitus.  - Blood glucose levels have been consistently within the 120s range, which is commendable.  - Morning readings are higher when fasting, with lower readings before lunch and supper.  - A comprehensive set of laboratory tests will be ordered, including HbA1c, cholesterol panel, kidney function tests, liver enzyme tests, and hemoglobin.  - Advised to maintain current dietary habits and continue monitoring blood glucose levels.    2. Neuropathy.  - Symptoms have improved with warmer weather but still flare up at night.  - Reports feeling stronger and more balanced off previous medication.  - Encouraged to continue monitoring symptoms and report any significant changes.  - No new medications or therapies prescribed at this time.    3. Low testosterone.  - Reduced testosterone medication and will monitor levels.  - A prescription for AndroGel will be provided, which can be refilled if testosterone levels are low.  - Testosterone level test will be ordered to assess the need for medication adjustment.  - Will review results and adjust treatment as necessary.    4. Gastritis.  - No recent episodes since the last visit in 04/2025.  - Previous treatment provided significant relief.  - No new symptoms reported.  - Continue

## 2025-06-04 ENCOUNTER — RESULTS FOLLOW-UP (OUTPATIENT)
Dept: FAMILY MEDICINE CLINIC | Facility: CLINIC | Age: 65
End: 2025-06-04

## 2025-06-04 LAB — TESTOST SERPL-MCNC: 301 NG/DL (ref 264–916)

## 2025-08-08 ENCOUNTER — OFFICE VISIT (OUTPATIENT)
Dept: FAMILY MEDICINE CLINIC | Facility: CLINIC | Age: 65
End: 2025-08-08

## 2025-08-08 VITALS
RESPIRATION RATE: 16 BRPM | OXYGEN SATURATION: 98 % | HEIGHT: 72 IN | BODY MASS INDEX: 33.56 KG/M2 | WEIGHT: 247.8 LBS | DIASTOLIC BLOOD PRESSURE: 89 MMHG | TEMPERATURE: 97.2 F | HEART RATE: 67 BPM | SYSTOLIC BLOOD PRESSURE: 140 MMHG

## 2025-08-08 DIAGNOSIS — G62.9 POLYNEUROPATHY, UNSPECIFIED: ICD-10-CM

## 2025-08-08 DIAGNOSIS — M19.011 PRIMARY OSTEOARTHRITIS OF RIGHT SHOULDER: Primary | ICD-10-CM

## 2025-08-08 DIAGNOSIS — E11.65 TYPE 2 DIABETES MELLITUS WITH HYPERGLYCEMIA, WITHOUT LONG-TERM CURRENT USE OF INSULIN (HCC): ICD-10-CM

## 2025-08-08 DIAGNOSIS — R79.89 LOW TESTOSTERONE: ICD-10-CM

## 2025-08-08 DIAGNOSIS — F51.04 CHRONIC INSOMNIA: ICD-10-CM

## 2025-08-08 DIAGNOSIS — M79.10 MYALGIA: ICD-10-CM

## 2025-08-08 DIAGNOSIS — I10 ESSENTIAL HYPERTENSION: ICD-10-CM

## 2025-08-08 LAB
ALBUMIN SERPL-MCNC: 4 G/DL (ref 3.2–4.6)
ALBUMIN/GLOB SERPL: 1.2 (ref 1–1.9)
ALP SERPL-CCNC: 102 U/L (ref 40–129)
ALT SERPL-CCNC: 17 U/L (ref 8–55)
ANION GAP SERPL CALC-SCNC: 12 MMOL/L (ref 7–16)
AST SERPL-CCNC: 18 U/L (ref 15–37)
BASOPHILS # BLD: 0.08 K/UL (ref 0–0.2)
BASOPHILS NFR BLD: 0.8 % (ref 0–2)
BILIRUB SERPL-MCNC: 0.8 MG/DL (ref 0–1.2)
BUN SERPL-MCNC: 13 MG/DL (ref 8–23)
CALCIUM SERPL-MCNC: 9.5 MG/DL (ref 8.8–10.2)
CHLORIDE SERPL-SCNC: 101 MMOL/L (ref 98–107)
CO2 SERPL-SCNC: 26 MMOL/L (ref 20–29)
CREAT SERPL-MCNC: 0.78 MG/DL (ref 0.8–1.3)
DIFFERENTIAL METHOD BLD: NORMAL
EOSINOPHIL # BLD: 0.15 K/UL (ref 0–0.8)
EOSINOPHIL NFR BLD: 1.5 % (ref 0.5–7.8)
ERYTHROCYTE [DISTWIDTH] IN BLOOD BY AUTOMATED COUNT: 13.8 % (ref 11.9–14.6)
EST. AVERAGE GLUCOSE BLD GHB EST-MCNC: 125 MG/DL
GLOBULIN SER CALC-MCNC: 3.4 G/DL (ref 2.3–3.5)
GLUCOSE SERPL-MCNC: 111 MG/DL (ref 70–99)
HBA1C MFR BLD: 6 % (ref 0–5.6)
HCT VFR BLD AUTO: 45.5 % (ref 41.1–50.3)
HGB BLD-MCNC: 15.6 G/DL (ref 13.6–17.2)
IMM GRANULOCYTES # BLD AUTO: 0.04 K/UL (ref 0–0.5)
IMM GRANULOCYTES NFR BLD AUTO: 0.4 % (ref 0–5)
LYMPHOCYTES # BLD: 1.64 K/UL (ref 0.5–4.6)
LYMPHOCYTES NFR BLD: 16.3 % (ref 13–44)
MAGNESIUM SERPL-MCNC: 2.2 MG/DL (ref 1.8–2.4)
MCH RBC QN AUTO: 30.9 PG (ref 26.1–32.9)
MCHC RBC AUTO-ENTMCNC: 34.3 G/DL (ref 31.4–35)
MCV RBC AUTO: 90.1 FL (ref 82–102)
MONOCYTES # BLD: 0.67 K/UL (ref 0.1–1.3)
MONOCYTES NFR BLD: 6.7 % (ref 4–12)
NEUTS SEG # BLD: 7.49 K/UL (ref 1.7–8.2)
NEUTS SEG NFR BLD: 74.3 % (ref 43–78)
NRBC # BLD: 0 K/UL (ref 0–0.2)
PLATELET # BLD AUTO: 261 K/UL (ref 150–450)
PMV BLD AUTO: 9.7 FL (ref 9.4–12.3)
POTASSIUM SERPL-SCNC: 4.2 MMOL/L (ref 3.5–5.1)
PROT SERPL-MCNC: 7.4 G/DL (ref 6.3–8.2)
RBC # BLD AUTO: 5.05 M/UL (ref 4.23–5.6)
SODIUM SERPL-SCNC: 138 MMOL/L (ref 136–145)
WBC # BLD AUTO: 10.1 K/UL (ref 4.3–11.1)

## 2025-08-08 RX ORDER — DULOXETIN HYDROCHLORIDE 30 MG/1
30 CAPSULE, DELAYED RELEASE ORAL NIGHTLY
COMMUNITY

## 2025-08-08 RX ORDER — ZOLPIDEM TARTRATE 12.5 MG/1
12.5 TABLET, FILM COATED, EXTENDED RELEASE ORAL NIGHTLY PRN
Qty: 30 TABLET | Refills: 5 | Status: SHIPPED | OUTPATIENT
Start: 2025-08-08 | End: 2026-02-04

## 2025-08-08 RX ORDER — PANTOPRAZOLE SODIUM 40 MG/1
40 TABLET, DELAYED RELEASE ORAL DAILY
Qty: 90 TABLET | Refills: 3 | Status: SHIPPED | OUTPATIENT
Start: 2025-08-08

## 2025-08-10 LAB — TESTOST SERPL-MCNC: 347 NG/DL (ref 264–916)

## 2025-08-21 ENCOUNTER — E-VISIT (OUTPATIENT)
Dept: FAMILY MEDICINE CLINIC | Facility: CLINIC | Age: 65
End: 2025-08-21

## 2025-08-21 DIAGNOSIS — J01.90 ACUTE NON-RECURRENT SINUSITIS, UNSPECIFIED LOCATION: Primary | ICD-10-CM

## 2025-08-21 RX ORDER — CEFUROXIME AXETIL 500 MG/1
500 TABLET ORAL 2 TIMES DAILY
Qty: 20 TABLET | Refills: 0 | Status: SHIPPED | OUTPATIENT
Start: 2025-08-21 | End: 2025-08-31

## (undated) DEVICE — NEEDLE HYPO 25GA L1.5IN BLU POLYPR HUB S STL REG BVL STR

## (undated) DEVICE — VISUALIZATION SYSTEM: Brand: CLEARIFY

## (undated) DEVICE — ADHESIVE SKIN CLSR 1ML TISS HI VISC EXOFIN

## (undated) DEVICE — SPONGE LAPAROTOMY W18XL18IN WHITE STRUNG RADIOPAQUE STERILE

## (undated) DEVICE — 3M™ TEGADERM™ TRANSPARENT FILM DRESSING FRAME STYLE, 1624W, 2-3/8 IN X 2-3/4 IN (6 CM X 7 CM), 100/CT 4CT/CASE: Brand: 3M™ TEGADERM™

## (undated) DEVICE — 2108 SERIES SAGITTAL BLADE (18.6 X 0.64 X 61.1MM)

## (undated) DEVICE — REM POLYHESIVE ADULT PATIENT RETURN ELECTRODE: Brand: VALLEYLAB

## (undated) DEVICE — SHOULDR GPS HEX PINS KIT
Type: IMPLANTABLE DEVICE | Site: SHOULDER | Status: NON-FUNCTIONAL
Brand: GPS
Removed: 2022-09-23

## (undated) DEVICE — TOTAL SHOULDER DR KOCH: Brand: MEDLINE INDUSTRIES, INC.

## (undated) DEVICE — CONTAINER SPEC FRMLN 120ML --

## (undated) DEVICE — STERILE POLYISOPRENE POWDER-FREE SURGICAL GLOVES: Brand: PROTEXIS

## (undated) DEVICE — SHOULDER STABILIZATION KIT,                                    DISPOSABLE 12 PER BOX

## (undated) DEVICE — TUBING INSUFFLATION SMK EVAC HI FLO SET PNEUMOCLEAR

## (undated) DEVICE — LAPAROSCOPIC TROCAR SLEEVE/SINGLE USE: Brand: KII® OPTICAL ACCESS SYSTEM

## (undated) DEVICE — STANDARD SURGICAL GOWN, L: Brand: CONVERTORS

## (undated) DEVICE — WATERPROOF, BACTERIA PROOF DRESSING WITH ABSORBENT SEE THROUGH PAD: Brand: OPSITE POST-OP VISIBLE 15X10CM CTN 20

## (undated) DEVICE — Device

## (undated) DEVICE — COVER,TABLE,HEAVY DUTY,79"X110",STRL: Brand: MEDLINE

## (undated) DEVICE — POWDER HEMOSTAT GEL 3.0GR -- SURGICEL

## (undated) DEVICE — SUTURE VCRL SZ 4-0 L27IN ABSRB UD L19MM PS-2 3/8 CIR PRIM J426H

## (undated) DEVICE — TIP CLEANR ELECSURG 1.75X1.75 --

## (undated) DEVICE — SUTURE ETHBND EXCEL SZ 5 L30IN NONABSORBABLE GRN L40MM V-37 MB66G

## (undated) DEVICE — WIRE ORTH SMOOTH SGL SHRP TIP TRCR PLN S STL ST 16MM DIA
Type: IMPLANTABLE DEVICE | Site: SHOULDER | Status: NON-FUNCTIONAL
Removed: 2022-09-23

## (undated) DEVICE — SUTURE FIBERWIRE SZ 5 L38IN NONABSORBABLE BLU L48MM 1/2 AR7211

## (undated) DEVICE — GARMENT,MEDLINE,DVT,INT,CALF,MED, GEN2: Brand: MEDLINE

## (undated) DEVICE — SUTURE MCRYL SZ 2-0 L27IN ABSRB UD CP-1 1 L36MM 1/2 CIR REV Y266H

## (undated) DEVICE — SURGIFOAM SPNG SZ 12-7

## (undated) DEVICE — ELECTRODE NDL 2.8IN COAT VALLEYLAB

## (undated) DEVICE — SYRINGE, LUER LOCK, 30ML: Brand: MEDLINE

## (undated) DEVICE — GARMENT,MEDLINE,DVT,INT,CALF,LG, GEN2: Brand: MEDLINE

## (undated) DEVICE — T-MAX DISPOSABLE FACE MASK 8 PER BOX

## (undated) DEVICE — TOTAL 1-LAYER TRAY, LATEX FOLEY, 16FR 10: Brand: MEDLINE

## (undated) DEVICE — SUTURE SZ 0 27IN 5/8 CIR UR-6  TAPER PT VIOLET ABSRB VICRYL J603H

## (undated) DEVICE — PREP SKN CHLRAPRP APL 26ML STR --

## (undated) DEVICE — 2, DISPOSABLE SUCTION/IRRIGATOR WITHOUT DISPOSABLE TIP: Brand: STRYKEFLOW

## (undated) DEVICE — SOLUTION IRRIG 1000ML 0.9% SOD CHL USP POUR PLAS BTL

## (undated) DEVICE — Device: Brand: JELCO

## (undated) DEVICE — PACK SURG PROC KNEE USER GPS

## (undated) DEVICE — CURETTE SURG FOR BNE CEM REM QUIK USE

## (undated) DEVICE — PREP SKN CLORAPREP ORN STRL --

## (undated) DEVICE — TROCAR: Brand: KII® SLEEVE

## (undated) DEVICE — 2000CC GUARDIAN II: Brand: GUARDIAN

## (undated) DEVICE — TOTAL TRAY, DB, 100% SILI FOLEY, 16FR 10: Brand: MEDLINE

## (undated) DEVICE — BAG SPEC REM 224ML W4XL6IN DIA10MM 1 HND GYN DISP ENDOPCH

## (undated) DEVICE — GAUZE,SPONGE,2"X2",8PLY,STERILE,LF,2'S: Brand: MEDLINE

## (undated) DEVICE — CURETTE BNE CEM 10IN DISP --

## (undated) DEVICE — SUTURE STRATAFIX SPRL MCRYL + SZ 3-0 L18IN ABSRB UD PS-2 SXMP1B107

## (undated) DEVICE — PIN FIX STEINMANN 3.2X178 MM STRL
Type: IMPLANTABLE DEVICE | Site: SHOULDER | Status: NON-FUNCTIONAL
Removed: 2022-09-23

## (undated) DEVICE — LOGICUT SCISSOR LENGTH 320MM: Brand: LOGI - LAPAROSCOPIC INSTRUMENT SYSTEM

## (undated) DEVICE — BLADE, TONGUE, 6", STERILE: Brand: MEDLINE

## (undated) DEVICE — TRAY URIN CATH PED 16FR BLLN 5CC HYDRGEL INDWL DRNGE BG LTX

## (undated) DEVICE — STRIP,CLOSURE,WOUND,MEDI-STRIP,1/2X4: Brand: MEDLINE

## (undated) DEVICE — SOLUTION IV 1000ML 0.9% SOD CHL

## (undated) DEVICE — APPLIER CLP M/L SHFT DIA5MM 15 LIG LIGAMAX 5

## (undated) DEVICE — INSTRUMENT KIT SHLDR HEX PIN GPS

## (undated) DEVICE — BLADE ASSEMB CLP HAIR FINE --

## (undated) DEVICE — BNDG,ELSTC,MATRIX,STRL,4"X5YD,LF,HOOK&LP: Brand: MEDLINE

## (undated) DEVICE — INTENDED FOR TISSUE SEPARATION, AND OTHER PROCEDURES THAT REQUIRE A SHARP SURGICAL BLADE TO PUNCTURE OR CUT.: Brand: BARD-PARKER ®  SAFETY SCALPED

## (undated) DEVICE — TROCARS: Brand: KII® BLUNT TIP ACCESS SYSTEM

## (undated) DEVICE — GENERAL LAPAROSCOPY: Brand: MEDLINE INDUSTRIES, INC.

## (undated) DEVICE — 3M™ STERI-DRAPE™ U-DRAPE 1015: Brand: STERI-DRAPE™

## (undated) DEVICE — GLOVE SURG SZ 6.5 L11.2IN THK8.6MIL LT BRN LTX FREE

## (undated) DEVICE — STERILE POLYISOPRENE POWDER-FREE SURGICAL GLOVES WITH EMOLLIENT COATING: Brand: PROTEXIS

## (undated) DEVICE — NEEDLE HYPO 18GA L1.5IN PNK S STL HUB POLYPR SHLD REG BVL